# Patient Record
Sex: MALE | Race: BLACK OR AFRICAN AMERICAN | NOT HISPANIC OR LATINO | Employment: UNEMPLOYED | URBAN - METROPOLITAN AREA
[De-identification: names, ages, dates, MRNs, and addresses within clinical notes are randomized per-mention and may not be internally consistent; named-entity substitution may affect disease eponyms.]

---

## 2017-02-22 ENCOUNTER — HISTORICAL (OUTPATIENT)
Dept: INTERNAL MEDICINE | Facility: CLINIC | Age: 52
End: 2017-02-22

## 2017-09-12 ENCOUNTER — HISTORICAL (OUTPATIENT)
Dept: INTERNAL MEDICINE | Facility: CLINIC | Age: 52
End: 2017-09-12

## 2017-09-12 LAB
AMPHET UR QL SCN: NEGATIVE
BARBITURATE SCN PRESENT UR: NEGATIVE
BENZODIAZ UR QL SCN: NEGATIVE
CANNABINOIDS UR QL SCN: NEGATIVE
COCAINE UR QL SCN: POSITIVE
CRP SERPL-MCNC: <0.3 MG/DL
ERYTHROCYTE [SEDIMENTATION RATE] IN BLOOD: 26 MM/HR (ref 0–15)
ETHANOL SERPL-MCNC: 102 MG/DL
HAV IGM SERPL QL IA: NONREACTIVE
HBV CORE IGM SERPL QL IA: NONREACTIVE
HBV SURFACE AG SERPL QL IA: NEGATIVE
HCV AB SERPL QL IA: NONREACTIVE
HIV 1+2 AB+HIV1 P24 AG SERPL QL IA: NONREACTIVE
OPIATES UR QL SCN: NEGATIVE
PCP UR QL: NEGATIVE
PH UR STRIP.AUTO: 5.5 [PH] (ref 5–8)
T PALLIDUM AB SER QL: NONREACTIVE
TEMPERATURE, URINE (OHS): 24.4 DEGC (ref 20–25)

## 2018-02-12 ENCOUNTER — HISTORICAL (OUTPATIENT)
Dept: INTERNAL MEDICINE | Facility: CLINIC | Age: 53
End: 2018-02-12

## 2018-02-12 LAB
ABS NEUT (OLG): 1.79 X10(3)/MCL (ref 2.1–9.2)
ALBUMIN SERPL-MCNC: 3.7 GM/DL (ref 3.4–5)
ALBUMIN/GLOB SERPL: 1 RATIO (ref 1–2)
ALP SERPL-CCNC: 55 UNIT/L (ref 45–117)
ALT SERPL-CCNC: 50 UNIT/L (ref 12–78)
APPEARANCE, UA: CLEAR
AST SERPL-CCNC: 238 UNIT/L (ref 15–37)
BACTERIA #/AREA URNS AUTO: ABNORMAL /[HPF]
BASOPHILS # BLD AUTO: 0.02 X10(3)/MCL
BASOPHILS NFR BLD AUTO: 1 % (ref 0–1)
BILIRUB SERPL-MCNC: 0.5 MG/DL (ref 0.2–1)
BILIRUB UR QL STRIP: NEGATIVE
BILIRUBIN DIRECT+TOT PNL SERPL-MCNC: 0.2 MG/DL
BILIRUBIN DIRECT+TOT PNL SERPL-MCNC: 0.3 MG/DL
BUN SERPL-MCNC: 10 MG/DL (ref 7–18)
CALCIUM SERPL-MCNC: 8.7 MG/DL (ref 8.5–10.1)
CHLORIDE SERPL-SCNC: 101 MMOL/L (ref 98–107)
CHOLEST SERPL-MCNC: 221 MG/DL
CHOLEST/HDLC SERPL: 3.2 {RATIO} (ref 0–5)
CO2 SERPL-SCNC: 25 MMOL/L (ref 21–32)
COLOR UR: YELLOW
CREAT SERPL-MCNC: 0.8 MG/DL (ref 0.6–1.3)
DEPRECATED CALCIDIOL+CALCIFEROL SERPL-MC: 9.95 NG/ML (ref 30–80)
EOSINOPHIL # BLD AUTO: 0.03 10*3/UL
EOSINOPHIL NFR BLD AUTO: 1 % (ref 0–5)
ERYTHROCYTE [DISTWIDTH] IN BLOOD BY AUTOMATED COUNT: 14.1 % (ref 11.5–14.5)
GLOBULIN SER-MCNC: 5.3 GM/ML (ref 2.3–3.5)
GLUCOSE (UA): NORMAL
GLUCOSE SERPL-MCNC: 73 MG/DL (ref 74–106)
HAV IGM SERPL QL IA: NONREACTIVE
HBV CORE IGM SERPL QL IA: NONREACTIVE
HBV SURFACE AG SERPL QL IA: NEGATIVE
HCT VFR BLD AUTO: 37.3 % (ref 40–51)
HCV AB SERPL QL IA: NONREACTIVE
HDLC SERPL-MCNC: 69 MG/DL
HGB BLD-MCNC: 12.6 GM/DL (ref 13.5–17.5)
HGB UR QL STRIP: 0.03 MG/DL
HIV 1+2 AB+HIV1 P24 AG SERPL QL IA: NONREACTIVE
HYALINE CASTS #/AREA URNS LPF: 0 /[LPF]
IMM GRANULOCYTES # BLD AUTO: 0.01 10*3/UL
IMM GRANULOCYTES NFR BLD AUTO: 0 %
KETONES UR QL STRIP: 20 MG/DL
LDLC SERPL CALC-MCNC: 140 MG/DL (ref 0–130)
LEUKOCYTE ESTERASE UR QL STRIP: NEGATIVE
LYMPHOCYTES # BLD AUTO: 0.65 X10(3)/MCL
LYMPHOCYTES NFR BLD AUTO: 24 % (ref 15–40)
MCH RBC QN AUTO: 29.8 PG (ref 26–34)
MCHC RBC AUTO-ENTMCNC: 33.8 GM/DL (ref 31–37)
MCV RBC AUTO: 88.2 FL (ref 80–100)
MONOCYTES # BLD AUTO: 0.16 X10(3)/MCL
MONOCYTES NFR BLD AUTO: 6 % (ref 4–12)
MUCOUS THREADS URNS QL MICRO: ABNORMAL
NEUTROPHILS # BLD AUTO: 1.79 X10(3)/MCL
NEUTROPHILS NFR BLD AUTO: 67 X10(3)/MCL
NITRITE UR QL STRIP: NEGATIVE
PH UR STRIP: 5.5 [PH] (ref 4.5–8)
PLATELET # BLD AUTO: 66 X10(3)/MCL (ref 130–400)
PMV BLD AUTO: 10.4 FL (ref 7.4–10.4)
POTASSIUM SERPL-SCNC: 3.6 MMOL/L (ref 3.5–5.1)
PROT SERPL-MCNC: 9 GM/DL (ref 6.4–8.2)
PROT UR QL STRIP: 50 MG/DL
PSA SERPL-MCNC: 0.4 NG/ML
RBC # BLD AUTO: 4.23 X10(6)/MCL (ref 4.5–5.9)
RBC #/AREA URNS AUTO: ABNORMAL /[HPF]
SODIUM SERPL-SCNC: 139 MMOL/L (ref 136–145)
SP GR UR STRIP: 1.03 (ref 1–1.03)
SQUAMOUS #/AREA URNS LPF: ABNORMAL /[LPF]
T PALLIDUM AB SER QL: NONREACTIVE
TRIGL SERPL-MCNC: 58 MG/DL
TSH SERPL-ACNC: 0.83 MIU/L (ref 0.36–3.74)
UROBILINOGEN UR STRIP-ACNC: 2 MG/DL
VLDLC SERPL CALC-MCNC: 12 MG/DL
WBC # SPEC AUTO: 2.7 X10(3)/MCL (ref 4.5–11)
WBC #/AREA URNS AUTO: ABNORMAL /HPF

## 2018-03-12 ENCOUNTER — HISTORICAL (OUTPATIENT)
Dept: ADMINISTRATIVE | Facility: HOSPITAL | Age: 53
End: 2018-03-12

## 2018-03-12 LAB
AMMONIA PLAS-MSCNC: 26 MMOL/L (ref 11–32)
FOLATE SERPL-MCNC: 5.9 NG/ML (ref 3.1–17.5)
VIT B12 SERPL-MCNC: 1390 PG/ML (ref 193–986)

## 2019-09-23 ENCOUNTER — HISTORICAL (OUTPATIENT)
Dept: INTERNAL MEDICINE | Facility: CLINIC | Age: 54
End: 2019-09-23

## 2019-09-23 LAB
ABS NEUT (OLG): 3.13 X10(3)/MCL (ref 2.1–9.2)
ALBUMIN SERPL-MCNC: 3.6 GM/DL (ref 3.4–5)
ALBUMIN/GLOB SERPL: 0.8 RATIO (ref 1.1–2)
ALP SERPL-CCNC: 66 UNIT/L (ref 45–117)
ALT SERPL-CCNC: 34 UNIT/L (ref 12–78)
AST SERPL-CCNC: 27 UNIT/L (ref 15–37)
BASOPHILS # BLD AUTO: 0 X10(3)/MCL (ref 0–0.2)
BASOPHILS NFR BLD AUTO: 0 %
BILIRUB SERPL-MCNC: 0.6 MG/DL (ref 0.2–1)
BILIRUBIN DIRECT+TOT PNL SERPL-MCNC: 0.2 MG/DL (ref 0–0.2)
BILIRUBIN DIRECT+TOT PNL SERPL-MCNC: 0.4 MG/DL
BUN SERPL-MCNC: 11 MG/DL (ref 7–18)
CALCIUM SERPL-MCNC: 9.6 MG/DL (ref 8.5–10.1)
CHLORIDE SERPL-SCNC: 109 MMOL/L (ref 98–107)
CHOLEST SERPL-MCNC: 148 MG/DL
CHOLEST/HDLC SERPL: 4.1 {RATIO} (ref 0–5)
CO2 SERPL-SCNC: 26 MMOL/L (ref 21–32)
CREAT SERPL-MCNC: 0.9 MG/DL (ref 0.6–1.3)
EOSINOPHIL # BLD AUTO: 0 X10(3)/MCL (ref 0–0.9)
EOSINOPHIL NFR BLD AUTO: 1 %
ERYTHROCYTE [DISTWIDTH] IN BLOOD BY AUTOMATED COUNT: 13.2 % (ref 11.5–14.5)
EST. AVERAGE GLUCOSE BLD GHB EST-MCNC: 126 MG/DL
GLOBULIN SER-MCNC: 4.7 GM/ML (ref 2.3–3.5)
GLUCOSE SERPL-MCNC: 84 MG/DL (ref 74–106)
HBA1C MFR BLD: 6 % (ref 4.2–6.3)
HCT VFR BLD AUTO: 41 % (ref 40–51)
HDLC SERPL-MCNC: 36 MG/DL (ref 40–59)
HGB BLD-MCNC: 12.7 GM/DL (ref 13.5–17.5)
IMM GRANULOCYTES # BLD AUTO: 0.01 10*3/UL
IMM GRANULOCYTES NFR BLD AUTO: 0 %
LDLC SERPL CALC-MCNC: 97 MG/DL
LYMPHOCYTES # BLD AUTO: 1.1 X10(3)/MCL (ref 0.6–4.6)
LYMPHOCYTES NFR BLD AUTO: 23 %
MCH RBC QN AUTO: 29.5 PG (ref 26–34)
MCHC RBC AUTO-ENTMCNC: 31 GM/DL (ref 31–37)
MCV RBC AUTO: 95.3 FL (ref 80–100)
MONOCYTES # BLD AUTO: 0.3 X10(3)/MCL (ref 0.1–1.3)
MONOCYTES NFR BLD AUTO: 7 %
NEUTROPHILS # BLD AUTO: 3.13 X10(3)/MCL (ref 2.1–9.2)
NEUTROPHILS NFR BLD AUTO: 68 %
PLATELET # BLD AUTO: 180 X10(3)/MCL (ref 130–400)
PMV BLD AUTO: 10.6 FL (ref 7.4–10.4)
POTASSIUM SERPL-SCNC: 3.4 MMOL/L (ref 3.5–5.1)
PROT SERPL-MCNC: 8.3 GM/DL (ref 6.4–8.2)
RBC # BLD AUTO: 4.3 X10(6)/MCL (ref 4.5–5.9)
SODIUM SERPL-SCNC: 143 MMOL/L (ref 136–145)
TRIGL SERPL-MCNC: 73 MG/DL
VLDLC SERPL CALC-MCNC: 15 MG/DL
WBC # SPEC AUTO: 4.6 X10(3)/MCL (ref 4.5–11)

## 2019-10-21 ENCOUNTER — HISTORICAL (OUTPATIENT)
Dept: INTERNAL MEDICINE | Facility: CLINIC | Age: 54
End: 2019-10-21

## 2019-12-18 ENCOUNTER — HISTORICAL (OUTPATIENT)
Dept: ADMINISTRATIVE | Facility: HOSPITAL | Age: 54
End: 2019-12-18

## 2019-12-18 LAB
ABS NEUT (OLG): 4.51 X10(3)/MCL (ref 2.1–9.2)
ALBUMIN SERPL-MCNC: 3.9 GM/DL (ref 3.4–5)
ALBUMIN/GLOB SERPL: 0.8 RATIO (ref 1.1–2)
ALP SERPL-CCNC: 72 UNIT/L (ref 45–117)
ALT SERPL-CCNC: 22 UNIT/L (ref 12–78)
AST SERPL-CCNC: 33 UNIT/L (ref 15–37)
BASOPHILS # BLD AUTO: 0 X10(3)/MCL (ref 0–0.2)
BASOPHILS NFR BLD AUTO: 0 %
BILIRUB SERPL-MCNC: 0.6 MG/DL (ref 0.2–1)
BILIRUBIN DIRECT+TOT PNL SERPL-MCNC: 0.2 MG/DL (ref 0–0.2)
BILIRUBIN DIRECT+TOT PNL SERPL-MCNC: 0.4 MG/DL
BUN SERPL-MCNC: 11 MG/DL (ref 7–18)
CALCIUM SERPL-MCNC: 9.4 MG/DL (ref 8.5–10.1)
CHLORIDE SERPL-SCNC: 104 MMOL/L (ref 98–107)
CO2 SERPL-SCNC: 25 MMOL/L (ref 21–32)
CREAT SERPL-MCNC: 0.8 MG/DL (ref 0.6–1.3)
EOSINOPHIL # BLD AUTO: 0 X10(3)/MCL (ref 0–0.9)
EOSINOPHIL NFR BLD AUTO: 0 %
ERYTHROCYTE [DISTWIDTH] IN BLOOD BY AUTOMATED COUNT: 13.2 % (ref 11.5–14.5)
GLOBULIN SER-MCNC: 4.9 GM/ML (ref 2.3–3.5)
GLUCOSE SERPL-MCNC: 85 MG/DL (ref 74–106)
HCT VFR BLD AUTO: 39 % (ref 40–51)
HGB BLD-MCNC: 12.5 GM/DL (ref 13.5–17.5)
IMM GRANULOCYTES # BLD AUTO: 0.02 10*3/UL
IMM GRANULOCYTES NFR BLD AUTO: 0 %
LYMPHOCYTES # BLD AUTO: 1.2 X10(3)/MCL (ref 0.6–4.6)
LYMPHOCYTES NFR BLD AUTO: 19 %
MCH RBC QN AUTO: 27.8 PG (ref 26–34)
MCHC RBC AUTO-ENTMCNC: 32.1 GM/DL (ref 31–37)
MCV RBC AUTO: 86.9 FL (ref 80–100)
MONOCYTES # BLD AUTO: 0.3 X10(3)/MCL (ref 0.1–1.3)
MONOCYTES NFR BLD AUTO: 6 %
NEUTROPHILS # BLD AUTO: 4.51 X10(3)/MCL (ref 2.1–9.2)
NEUTROPHILS NFR BLD AUTO: 74 %
PLATELET # BLD AUTO: 177 X10(3)/MCL (ref 130–400)
PMV BLD AUTO: 10.3 FL (ref 7.4–10.4)
POTASSIUM SERPL-SCNC: 3.6 MMOL/L (ref 3.5–5.1)
PROT SERPL-MCNC: 8.8 GM/DL (ref 6.4–8.2)
RBC # BLD AUTO: 4.49 X10(6)/MCL (ref 4.5–5.9)
SODIUM SERPL-SCNC: 138 MMOL/L (ref 136–145)
WBC # SPEC AUTO: 6.1 X10(3)/MCL (ref 4.5–11)

## 2021-02-22 ENCOUNTER — HISTORICAL (OUTPATIENT)
Dept: INTERNAL MEDICINE | Facility: CLINIC | Age: 56
End: 2021-02-22

## 2021-02-22 LAB
ALBUMIN SERPL-MCNC: 3.7 GM/DL (ref 3.5–5)
ALBUMIN/GLOB SERPL: 0.9 RATIO (ref 1.1–2)
ALP SERPL-CCNC: 62 UNIT/L (ref 40–150)
ALT SERPL-CCNC: 23 UNIT/L (ref 0–55)
APPEARANCE, UA: CLEAR
AST SERPL-CCNC: 40 UNIT/L (ref 5–34)
BACTERIA #/AREA URNS AUTO: ABNORMAL /HPF
BILIRUB SERPL-MCNC: 0.4 MG/DL
BILIRUB UR QL STRIP: NEGATIVE
BILIRUBIN DIRECT+TOT PNL SERPL-MCNC: 0.2 MG/DL (ref 0–0.5)
BILIRUBIN DIRECT+TOT PNL SERPL-MCNC: 0.2 MG/DL (ref 0–0.8)
BUN SERPL-MCNC: 16 MG/DL (ref 8.4–25.7)
CALCIUM SERPL-MCNC: 9.1 MG/DL (ref 8.4–10.2)
CHLORIDE SERPL-SCNC: 102 MMOL/L (ref 98–107)
CHLORIDE UR-SCNC: 168 MMOL/L
CO2 SERPL-SCNC: 28 MMOL/L (ref 22–29)
COLOR UR: NORMAL
CREAT SERPL-MCNC: 0.92 MG/DL (ref 0.73–1.18)
CREAT UR-MCNC: 132.2 MG/DL (ref 58–161)
DEPRECATED CALCIDIOL+CALCIFEROL SERPL-MC: 11.3 NG/ML (ref 30–80)
GLOBULIN SER-MCNC: 4.3 GM/DL (ref 2.4–3.5)
GLUCOSE (UA): NEGATIVE
GLUCOSE SERPL-MCNC: 95 MG/DL (ref 74–100)
HGB UR QL STRIP: NEGATIVE
HYALINE CASTS #/AREA URNS LPF: ABNORMAL /LPF
KETONES UR QL STRIP: NEGATIVE
LEUKOCYTE ESTERASE UR QL STRIP: NEGATIVE
MAGNESIUM SERPL-MCNC: 1.62 MG/DL (ref 1.6–2.6)
NITRITE UR QL STRIP: NEGATIVE
OSMOLALITY SERPL: 295 MOSM/KG (ref 280–300)
OSMOLALITY UR: 715 MOSM/KG (ref 300–1300)
PH UR STRIP: 5.5 [PH] (ref 4.5–8)
PHOSPHATE SERPL-MCNC: 3.5 MG/DL (ref 2.3–4.7)
POTASSIUM SERPL-SCNC: 4.4 MMOL/L (ref 3.5–5.1)
POTASSIUM UR-SCNC: 93.5 MMOL/L
PROT SERPL-MCNC: 8 GM/DL (ref 6.4–8.3)
PROT UR QL STRIP: NEGATIVE
PROT UR STRIP-MCNC: 9.6 MG/DL
PROT/CREAT UR-RTO: 72.6 MG/GM CR
RBC #/AREA URNS AUTO: ABNORMAL /HPF
SODIUM SERPL-SCNC: 139 MMOL/L (ref 136–145)
SODIUM UR-SCNC: 104 MMOL/L
SP GR UR STRIP: 1.02 (ref 1–1.03)
SQUAMOUS #/AREA URNS LPF: ABNORMAL /LPF
UROBILINOGEN UR STRIP-ACNC: NORMAL
WBC #/AREA URNS AUTO: ABNORMAL /HPF

## 2021-07-29 ENCOUNTER — HISTORICAL (OUTPATIENT)
Dept: ADMINISTRATIVE | Facility: HOSPITAL | Age: 56
End: 2021-07-29

## 2021-07-29 LAB
ABS NEUT (OLG): 3.83 X10(3)/MCL (ref 2.1–9.2)
ALBUMIN SERPL-MCNC: 3.7 GM/DL (ref 3.5–5)
ALBUMIN/GLOB SERPL: 0.8 RATIO (ref 1.1–2)
ALP SERPL-CCNC: 68 UNIT/L (ref 40–150)
ALT SERPL-CCNC: 44 UNIT/L (ref 0–55)
AST SERPL-CCNC: 98 UNIT/L (ref 5–34)
BASOPHILS # BLD AUTO: 0 X10(3)/MCL (ref 0–0.2)
BASOPHILS NFR BLD AUTO: 0 %
BILIRUB SERPL-MCNC: 0.6 MG/DL
BILIRUBIN DIRECT+TOT PNL SERPL-MCNC: 0.3 MG/DL (ref 0–0.5)
BILIRUBIN DIRECT+TOT PNL SERPL-MCNC: 0.3 MG/DL (ref 0–0.8)
BUN SERPL-MCNC: 8 MG/DL (ref 8.4–25.7)
CALCIUM SERPL-MCNC: 9.1 MG/DL (ref 8.4–10.2)
CHLORIDE SERPL-SCNC: 98 MMOL/L (ref 98–107)
CHOLEST SERPL-MCNC: 194 MG/DL
CHOLEST/HDLC SERPL: 4 {RATIO} (ref 0–5)
CO2 SERPL-SCNC: 26 MMOL/L (ref 22–29)
CREAT SERPL-MCNC: 0.86 MG/DL (ref 0.73–1.18)
EOSINOPHIL # BLD AUTO: 0.1 X10(3)/MCL (ref 0–0.9)
EOSINOPHIL NFR BLD AUTO: 2 %
ERYTHROCYTE [DISTWIDTH] IN BLOOD BY AUTOMATED COUNT: 13.1 % (ref 11.5–14.5)
ETHANOL SERPL-MCNC: 124 MG/DL
GLOBULIN SER-MCNC: 4.9 GM/DL (ref 2.4–3.5)
GLUCOSE SERPL-MCNC: 94 MG/DL (ref 74–100)
HCT VFR BLD AUTO: 37.3 % (ref 40–51)
HDLC SERPL-MCNC: 52 MG/DL (ref 35–60)
HGB BLD-MCNC: 12.8 GM/DL (ref 13.5–17.5)
IMM GRANULOCYTES # BLD AUTO: 0.02 10*3/UL
IMM GRANULOCYTES NFR BLD AUTO: 0 %
LDLC SERPL CALC-MCNC: 126 MG/DL (ref 50–140)
LYMPHOCYTES # BLD AUTO: 0.8 X10(3)/MCL (ref 0.6–4.6)
LYMPHOCYTES NFR BLD AUTO: 16 %
MCH RBC QN AUTO: 30.2 PG (ref 26–34)
MCHC RBC AUTO-ENTMCNC: 34.3 GM/DL (ref 31–37)
MCV RBC AUTO: 88 FL (ref 80–100)
MONOCYTES # BLD AUTO: 0.3 X10(3)/MCL (ref 0.1–1.3)
MONOCYTES NFR BLD AUTO: 5 %
NEUTROPHILS # BLD AUTO: 3.83 X10(3)/MCL (ref 2.1–9.2)
NEUTROPHILS NFR BLD AUTO: 77 %
NRBC BLD AUTO-RTO: 0 % (ref 0–0.2)
PLATELET # BLD AUTO: 123 X10(3)/MCL (ref 130–400)
PMV BLD AUTO: 10.1 FL (ref 7.4–10.4)
POTASSIUM SERPL-SCNC: 3.9 MMOL/L (ref 3.5–5.1)
PROT SERPL-MCNC: 8.6 GM/DL (ref 6.4–8.3)
RBC # BLD AUTO: 4.24 X10(6)/MCL (ref 4.5–5.9)
SODIUM SERPL-SCNC: 134 MMOL/L (ref 136–145)
TRIGL SERPL-MCNC: 79 MG/DL (ref 34–140)
TSH SERPL-ACNC: 0.67 UIU/ML (ref 0.35–4.94)
VIT B12 SERPL-MCNC: 1600 PG/ML (ref 213–816)
VLDLC SERPL CALC-MCNC: 16 MG/DL
WBC # SPEC AUTO: 5 X10(3)/MCL (ref 4.5–11)

## 2022-04-10 ENCOUNTER — HISTORICAL (OUTPATIENT)
Dept: ADMINISTRATIVE | Facility: HOSPITAL | Age: 57
End: 2022-04-10
Payer: MEDICAID

## 2022-04-27 VITALS
DIASTOLIC BLOOD PRESSURE: 82 MMHG | BODY MASS INDEX: 30.79 KG/M2 | HEIGHT: 67 IN | SYSTOLIC BLOOD PRESSURE: 136 MMHG | WEIGHT: 196.19 LBS | OXYGEN SATURATION: 99 %

## 2022-05-03 NOTE — HISTORICAL [facility]
This is a historical note converted from [place]. Formatting and pictures may have been removed.  Please reference [place] for original formatting and attached multimedia. Chief Complaint  F/U, RESULTS OF FIT, NEEDS REFILLS, NO C/O VOICED, DID NOT TAKE B/P MEDS TODAY  History of Present Illness  This is a 54 year old AAM with a past medical history of alcohol abuse and hypertension.? He presents to IM clinic today in order to establish care after post wards clinic.? He does not have any complaints today.? His vitals today are significant for some hypertension, he is currently on norvasc 5mg, upon repeat his blood pressure was found to be ___.? He endorses ___ antibiotic compliance.? He  ?   Social Hx:  alcohol: continues to drink one 40 oz can of beer per day  tobacco: denies  illicits: denies  Review of Systems  CONSTITUTIONAL: No weight loss, subjective fever, chills, weakness or fatigue.?  HEENT: Eyes: No visual loss, blurred vision, double vision or yellow sclerae. Ears, Nose, Throat: No hearing loss, sneezing, congestion, runny nose or sore throat.?  SKIN: No rash or itching.?  CARDIOVASCULAR: No chest pain, chest pressure or chest discomfort. No palpitations or edema.?  RESPIRATORY: No shortness of breath, cough or sputum.?  GASTROINTESTINAL: No anorexia, nausea, vomiting or diarrhea. No abdominal pain or blood.?  GENITOURINARY: No dysuria, hematuria, or incontinence  NEUROLOGICAL: No headache, dizziness, syncope, paralysis, ataxia, numbness or tingling in the extremities. No change in bowel or bladder control.?  MUSCULOSKELETAL: No muscle, back pain, joint pain or stiffness.?  HEMATOLOGIC: No anemia, bleeding or bruising.?  ENDOCRINOLOGIC: No reports of sweating, cold or heat intolerance. No polyuria or polydipsia.?  ?  Physical Exam  Vitals & Measurements  T:?36.7? ?C (Oral)? HR:?87(Peripheral)? RR:?22? BP:?152/84?  HT:?175?cm? WT:?87.1?kg? BMI:?28.44?  Gen: He is alert and oriented x3. Well developed,  well-nourished, NAD.  Head: Normocephalic  Eyes: PERRL, EOMI, no conjunctival injection or pallor  Nose: No nasal discharge  Throat: No pharyngeal inflammation, erythema, discharge, mucous membranes moist  Neck: Supple. No carotid bruits.? No lymphadenopathy or thyromegaly.  Lungs: Clear to auscultation bilaterally  CV: Normal S1 & S2, RRR, no murmurs appreciated  Abdomen: Soft, NT/ND, bowel sounds present.? No hepatosplenomegaly  Ext: No cyanosis/clubbing/edema, pulses 2+  Neuro:?CN II-XII grossly intact, strength 5/5 throughout, normal sensation  Psych: Flat affect, but denies suicidal or homicidal ideations  Skin: No rashes, lesions, ulceration or induration  Assessment/Plan  Alcoholism  -the patient continues to drink 40 oz of beer per day  -ultrasound of liver done on 8/12/19 shows pattern suggestive of alcoholic hepatitis  -Despite a lengthy discussion about the benefits of alcohol cessation as well as the harms of drinking given that he has been hospitalized in the past, he is not interested in quitting drinking at this time, and is not interested in alcoholics anonymous  -reinforced that whenever he is ready to quit drinking, we would be available to help provide resources and support  ?  Hypertension  -patient blood pressure was in 150s in clinic today  -will discontinue amlodipine, will initiate propranolol 20mg twice a day due to his essential tremor  -will return to clinic in one month  ?  ?   Fecal occult blood test?done on 10/20/19 was negative  Vaccines up to date  ?  ?   RTC in?1 month  Referrals  Clinic Follow up, *Est. 01/18/20 3:00:00 CST, Order for future visit, HTN - Hypertension  Alcoholism, [facility]   Problem List/Past Medical History  Ongoing  Alcoholism  HTN - Hypertension  Historical  No qualifying data  Procedure/Surgical History  none   Medications  folic acid 1 mg oral tablet, 1 mg= 1 tab(s), Oral, Daily, 1 refills,? ?Not taking: OUT X 1 WEEK  Norvasc 5 mg oral tablet, 5 mg= 1  tab(s), Oral, Daily, 1 refills  thiamine 100 mg oral tablet, 100 mg= 1 tab(s), Oral, Daily, 1 refills,? ?Not taking: OUT X 1 WEEK  Vitamin B1 with Calcium oral tablet, Oral, Daily,? ?Not taking: OUT X 1 WEEK  Allergies  No Known Allergies  Social History  Abuse/Neglect  No, No, Yes, 12/18/2019  Alcohol  Current, Beer, Daily, Alcohol use interferes with work or home: No. Others hurt by drinking: No. Household alcohol concerns: No., 12/18/2019  Employment/School  Unemployed, 03/12/2018  Exercise  Home/Environment  Lives with Siblings. Living situation: Home/Independent. Alcohol abuse in household: Yes. Substance abuse in household: No. Smoker in household: No. Injuries/Abuse/Neglect in household: No. Family/Friends available for support: Yes. Concern for family members at home: No. Major illness in household: No. Financial concerns: No. TV/Computer concerns: No., 03/12/2018  Nutrition/Health  Regular, Wants to lose weight: Yes. Sleeping concerns: No. Feels highly stressed: No., 03/12/2018  Sexual  Substance Use - Denies Substance Abuse, 12/07/2015  Past, Marijuana, Drug use interferes with work/home: No., 12/18/2019  Tobacco - Denies Tobacco Use, 08/26/2012  Cigars or pipes but not daily within last 30 days, No, Household tobacco concerns: No. Smokeless Tobacco Use: Never., 12/18/2019  Family History  Family history is negative  Immunizations  Vaccine Date Status   tetanus-diphtheria toxoids 08/26/2012 Given   Health Maintenance  Health Maintenance  ???Pending?(in the next year)  ??? ??OverDue  ??? ? ? ?Diabetes Screening due??and every?  ??? ??Due?  ??? ? ? ?Aspirin Therapy for CVD Prevention due??12/18/19??and every 1??year(s)  ??? ? ? ?Hypertension Management-Education due??12/18/19??and every 1??year(s)  ??? ??Due In Future?  ??? ? ? ?Alcohol Misuse Screening not due until??01/01/20??and every 1??year(s)  ??? ? ? ?Obesity Screening not due until??01/01/20??and every 1??year(s)  ??? ? ? ?Hypertension Management-BMP  not due until??09/22/20??and every 1??year(s)  ??? ? ? ?Colorectal Screening not due until??10/19/20??and every 1??year(s)  ??? ? ? ?Blood Pressure Screening not due until??12/17/20??and every 1??year(s)  ??? ? ? ?Body Mass Index Check not due until??12/17/20??and every 1??year(s)  ??? ? ? ?Depression Screening not due until??12/17/20??and every 1??year(s)  ??? ? ? ?Hypertension Management-Blood Pressure not due until??12/17/20??and every 1??year(s)  ???Satisfied?(in the past 1 year)  ??? ??Satisfied?  ??? ? ? ?ADL Screening on??12/18/19.??Satisfied by Roly DAVID, Laura Tommy  ??? ? ? ?Alcohol Misuse Screening on??08/26/19.??Satisfied by Ry Barton MD  ??? ? ? ?Blood Pressure Screening on??12/18/19.??Satisfied by Roly DAVID Laura Tommy  ??? ? ? ?Body Mass Index Check on??12/18/19.??Satisfied by Roly DAVID, Laura Tommy  ??? ? ? ?Colorectal Screening on??10/20/19.??Satisfied by Maegan Green  ??? ? ? ?Depression Screening on??12/18/19.??Satisfied by Roly DAVID, Laura Tommy  ??? ? ? ?Diabetes Screening on??09/23/19.??Satisfied by Natalie Gutierrez  ??? ? ? ?Hypertension Management-Blood Pressure on??12/18/19.??Satisfied by Roly DAVID, Laura Tommy  ??? ? ? ?Lipid Screening on??09/23/19.??Satisfied by Natalie Gutierrez  ??? ? ? ?Obesity Screening on??12/18/19.??Satisfied by Roly DAVID, Laura Tommy  ?  Lab Results  Labs Last 24 Hours?  ?Chemistry? Hematology/Coagulation?   Sodium Lvl: 138 mmol/L (12/18/19 14:10:00) WBC: 6.1 x10(3)/mcL (12/18/19 14:10:00)   Potassium Lvl: 3.6 mmol/L (12/18/19 14:10:00) RBC:?4.49 x10(6)/mcL?Low (12/18/19 14:10:00)   Chloride: 104 mmol/L (12/18/19 14:10:00) Hgb:?12.5 gm/dL?Low (12/18/19 14:10:00)   CO2: 25 mmol/L (12/18/19 14:10:00) Hct:?39 %?Low (12/18/19 14:10:00)   Calcium Lvl: 9.4 mg/dL (12/18/19 14:10:00) Platelet: 177 x10(3)/mcL (12/18/19 14:10:00)   Glucose Lvl: 85 mg/dL (12/18/19 14:10:00) MCV: 86.9 fL (12/18/19 14:10:00)   BUN: 11 mg/dL (12/18/19  14:10:00) MCH: 27.8 pg (12/18/19 14:10:00)   Creatinine: 0.8 mg/dL (12/18/19 14:10:00) MCHC: 32.1 gm/dL (12/18/19 14:10:00)   eGFR-AA: >105 (12/18/19 14:10:00) RDW: 13.2 % (12/18/19 14:10:00)   eGFR-MELODIE: >105 (12/18/19 14:10:00) MPV: 10.3 fL (12/18/19 14:10:00)   Bili Total: 0.6 mg/dL (12/18/19 14:10:00) Abs Neut: 4.51 x10(3)/mcL (12/18/19 14:10:00)   Bili Direct: 0.2 mg/dL (12/18/19 14:10:00) Neutro Auto: 74 % (12/18/19 14:10:00)   Bili Indirect: 0.4 mg/dL (12/18/19 14:10:00) Lymph Auto: 19 % (12/18/19 14:10:00)   AST: 33 unit/L (12/18/19 14:10:00) Mono Auto: 6 % (12/18/19 14:10:00)   ALT: 22 unit/L (12/18/19 14:10:00) Eos Auto: 0 % (12/18/19 14:10:00)   Alk Phos: 72 unit/L (12/18/19 14:10:00) Abs Eos: 0 x10(3)/mcL (12/18/19 14:10:00)   Total Protein:?8.8 gm/dL?High (12/18/19 14:10:00) Basophil Auto: 0 % (12/18/19 14:10:00)   Albumin Lvl: 3.9 gm/dL (12/18/19 14:10:00) Abs Neutro: 4.51 x10(3)/mcL (12/18/19 14:10:00)   Globulin:?4.9 gm/mL?High (12/18/19 14:10:00) Abs Lymph: 1.2 x10(3)/mcL (12/18/19 14:10:00)   A/G Ratio:?0.8 ratio?Low (12/18/19 14:10:00) Abs Mono: 0.3 x10(3)/mcL (12/18/19 14:10:00)    Abs Baso: 0 x10(3)/mcL (12/18/19 14:10:00)    IG%: 0 % (12/18/19 14:10:00)    IG#: 0.02 (12/18/19 14:10:00)       Pt D/W medicine resident in clinic  Agree with above A/P

## 2022-05-03 NOTE — HISTORICAL [facility]
This is a historical note converted from Mayte. Formatting and pictures may have been removed.  Please reference Mayte for original formatting and attached multimedia. Chief Complaint  medication refill  sinus drip  History of Present Illness  Mr. Denton is a 55 year old AA male with a past medical history of alcohol abuse with hospitalizations from [facility].? He was last seen by me in 3/21.? His only complaint today is of weakness in his bilateral legs.? He states he occasionally loses his balance.? The weakness is better when he sits down.? He continues to drink about 25 ounces of beer per day?and reports that he is interested in quitting but does continue to have cravings.? He was last hospitalized?from s?back in December 2020.? He lives at home with his mother.? He has no additional complaints today.  ?  Past medical history  alcoholism  hypertension  hepatic steatosis  ?  Past surgical history  none  ?  Social History  alcohol: drinks 25 oz of beer per day  tobacco: denies  illicits: denies  Review of Systems  10 pt ROS negative unless otherwise stated above  Physical Exam  Vitals & Measurements  T:?37.0? ?C (Oral)? HR:?72(Peripheral)? RR:?20? BP:?142/86?  HT:?169.00?cm? WT:?87.500?kg? BMI:?30.64?  Gen: He is alert and oriented x3. Well developed, well-nourished, NAD.  Head: Normocephalic  Eyes: PERRL, EOMI, no conjunctival injection or pallor  Nose: No nasal discharge  Throat: No pharyngeal inflammation, erythema, discharge, mucous membranes moist  Neck: Supple. No carotid bruits.? No lymphadenopathy or thyromegaly.  Lungs: Clear to auscultation bilaterally  CV: Normal S1 & S2, RRR, no murmurs appreciated  Abdomen: Soft, NT/ND, bowel sounds present.? No hepatosplenomegaly  Ext: No cyanosis/clubbing/edema, pulses 2+  Neuro:?CN II-XII grossly intact, strength 5/5 throughout, normal sensation, (+) positive romberg sign after closing his eyes  Psych: Flat affect, but denies suicidal or homicidal ideations  Skin: No  rashes, lesions, ulceration or induration  Assessment/Plan  Alcohol abuse with hepatic steatosis  Counseled patient regarding alcohol cessation, especially given hx of DTs and ICU hospitalizations  Hepatitic steatosis is likely a result of alcoholism  Continue thiamine 100mg daily and folic acid 1mg daily  propranolol 20mg BID for tremors  Will start on acamprosate 666mg TID  ?   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   Hypertension  likely related to alcoholism  will increase propranolol to 40mg BID  ?  ?  ?   RTC in?3 months for follow up evaluation for alcoholism  ?   Ry Barton MD  Internal Medicine PGY2  ?   Attending physician addendum  Patient discussed?with medicine resident  I definitely strongly agree with?Dr. Barton-patient needs to seek alcohol rehab  Agree with above assessment and plan   Problem List/Past Medical History  Ongoing  Alcoholism  HTN - Hypertension  Obesity  Historical  No qualifying data  Procedure/Surgical History  none   Medications  folic acid 1 mg oral tablet, 1 mg= 1 tab(s), Oral, Daily, 3 refills  magnesium oxide 400 mg (240 mg elemental magnesium) oral tablet, 400 mg= 1 tab(s), Oral, Daily, 3 refills  propranolol 20 mg oral tablet, 20 mg= 1 tab(s), Oral, BID, 3 refills  Rolling Walker, See Instructions,? ?Unable to obtain: pt stated Last Dose Date/Time Unknown  thiamine 100 mg oral tablet, 100 mg= 1 tab(s), Oral, Daily, 3 refills  Vitamin B Complex with C and Zinc oral tablet, 1 tab(s), Oral, Daily, 3 refills  Vitamin D3 50,000 intl units (1250 mcg) oral capsule, 92599 IntUnit= 1 cap(s), Oral, qWeek,? ?Not taking: pt stated Last Dose Date/Time Unknown  Allergies  No Known Allergies  Social History  Abuse/Neglect  No, No, Yes, 07/29/2021  Alcohol  Current, Beer, 1-2 times per week, Alcohol use interferes with work or home: No. Others hurt by  drinking: No. Household alcohol concerns: No., 03/03/2021  Employment/School  Unemployed, Highest education level: High school., 07/29/2021  Exercise  Exercise duration: 60. Exercise frequency: Daily. Exercise type: Walking., 07/29/2021  Financial/Legal Situation  None, Salary patient, 03/03/2021  Home/Environment  Lives with sister. Living situation: Home/Independent. Walker/Cane, TV/Computer concerns: No. Single family house, 07/29/2021    Never in , 03/03/2021  Nutrition/Health  Regular, Fair, 07/29/2021  Sexual  Spiritual/Cultural  Rastafari, Yes, 01/13/2020  Substance Use - Denies Substance Abuse, 12/07/2015  Past, Marijuana, Drug use interferes with work/home: No., 12/18/2019  Tobacco - Denies Tobacco Use, 08/26/2012  Never (less than 100 in lifetime), N/A, 07/29/2021  Family History  Family history is negative  Immunizations  Vaccine Date Status   COVID-19 MRNA, LNP-S, PF- Pfizer 06/17/2021 Given   COVID-19 MRNA, LNP-S, PF- Pfizer 05/28/2021 Given   tetanus-diphtheria toxoids 08/26/2012 Given   Health Maintenance  Health Maintenance  ???Pending?(in the next year)  ??? ??OverDue  ??? ? ? ?Influenza Vaccine due??10/01/20??and every 1??day(s)  ??? ? ? ?Colorectal Screening due??10/19/20??and every 1??year(s)  ??? ??Due?  ??? ? ? ?Aspirin Therapy for CVD Prevention due??07/29/21??and every 1??year(s)  ??? ? ? ?Zoster Vaccine due??07/29/21??Unknown Frequency  ??? ??Due In Future?  ??? ? ? ?Obesity Screening not due until??01/01/22??and every 1??year(s)  ??? ? ? ?Alcohol Misuse Screening not due until??01/02/22??and every 1??year(s)  ??? ? ? ?Diabetes Screening not due until??02/22/22??and every 1??year(s)  ??? ? ? ?Hypertension Management-BMP not due until??02/22/22??and every 1??year(s)  ??? ? ? ?ADL Screening not due until??03/03/22??and every 1??year(s)  ??? ? ? ?Hypertension Management-Education not due until??03/11/22??and every 1??year(s)  ???Satisfied?(in the past 1 year)  ???  ??Satisfied?  ??? ? ? ?ADL Screening on??03/03/21.??Satisfied by Melanie Elizalde LPN  ??? ? ? ?Alcohol Misuse Screening on??03/11/21.??Satisfied by Ry Barton MD  ??? ? ? ?Blood Pressure Screening on??07/29/21.??Satisfied by Payton Zamora RN  ??? ? ? ?Body Mass Index Check on??07/29/21.??Satisfied by Payton Zamora RN  ??? ? ? ?Depression Screening on??07/29/21.??Satisfied by Payton Zamora RN  ??? ? ? ?Diabetes Screening on??02/22/21.??Satisfied by Irasema Cuevas  ??? ? ? ?Hypertension Management-Blood Pressure on??07/29/21.??Satisfied by Payton Zamora RN  ??? ? ? ?Hypertension Management-Education on??03/11/21.??Satisfied by Ry Barton MD  ??? ? ? ?Hypertension Management-BMP on??02/22/21.??Satisfied by Irasema Cuevas  ??? ? ? ?Influenza Vaccine on??03/11/21.??Satisfied by Laura Dunham LPN  ??? ? ? ?Obesity Screening on??07/29/21.??Satisfied by Payton Zamora RN  ??? ??Refused?  ??? ? ? ?Zoster Vaccine on??07/29/21.??Recorded by Payton Zamora RN??Reason: Patient Refuses  ?

## 2022-05-03 NOTE — HISTORICAL [facility]
This is a historical note converted from Mayte. Formatting and pictures may have been removed.  Please reference Mayte for original formatting and attached multimedia. Chief Complaint  Follow up- med refills  History of Present Illness  52 year old AAM here today for F/u. HTN at goal. Alcohol abuse pt is drinking heavy here today with tremors, pt states tremors are all the time even when he drinks.  Review of Systems  All Systems Negative Except: See HPI  Physical Exam  Vitals & Measurements  T:?37.3? ?C (Oral)? HR:?95(Peripheral)? RR:?20? BP:?138/83?  HT:?175?cm? HT:?175?cm? WT:?85.8?kg? WT:?85.8?kg? BMI:?28.02?  General:? Alert and oriented, No acute distress, General health good  Eye:? Pupils are equal, round and reactive to light, Normal conjunctiva.?  HENT:? Normocephalic, Normal hearing, Oral mucosa is moist, No pharyngeal erythema.?  Neck:? Supple, Non-tender, No carotid bruit, No jugular venous distention, No lymphadenopathy, No thyromegaly.?  Respiratory:? Lungs are clear to auscultation, Respirations are non-labored, Breath sounds are equal, Symmetrical chest wall expansion.?  Cardiovascular:? Normal rate, Regular rhythm, No murmur, Good pulses equal in all extremities, Normal peripheral perfusion, No edema.?  Gastrointestinal:? Soft, Non-tender, Non-distended, Normal bowel sounds, No organomegaly.?  Musculoskeletal: Normal range of motion, Normal strength, No tenderness, No deformity, Normal gait.?  Integumentary:? Warm, Dry.?  Neurologic:? Alert, Oriented.?  Psychiatric:? Cooperative, Appropriate mood & affect.?  ?  ?  ?  Assessment/Plan  Alcohol abuse  Discussed rehab with Pt he refused  Ordered:  Ammonia Level, Routine collect, *Est. 03/12/18 3:00:00 CDT, Blood, Order for future visit, *Est. Stop date 03/12/18 3:00:00 CDT, Lab Collect, Alcohol abuse  Elevated liver enzymes, 03/12/18 9:10:00 CDT  CT Head W/O Contrast, Routine, *Est. 03/19/18 3:00:00 CDT, Other (please specify), tremor, None,  Ambulatory, Rad Type, Order for future visit, Tremors of nervous system  Alcohol abuse, Schedule this test, [facility], *Est. 03/19/18 3:00:00 CDT  Folate Level, Routine collect, *Est. 03/12/18 3:00:00 CDT, Blood, Order for future visit, *Est. Stop date 03/12/18 3:00:00 CDT, Lab Collect, Alcohol abuse  Elevated liver enzymes, 03/12/18 9:09:00 CDT  Office/Outpatient Visit Level 4 Established [id number] PC, HTN - Hypertension  Hematuria  Alcohol abuse  Elevated liver enzymes  Elevated serum protein level, [facility], 03/12/18 9:17:00 CDT  Vitamin B12 Level, Routine collect, *Est. 03/12/18 3:00:00 CDT, Blood, Order for future visit, *Est. Stop date 03/12/18 3:00:00 CDT, Lab Collect, Alcohol abuse  Elevated liver enzymes, 03/12/18 9:09:00 CDT  ?  Elevated liver enzymes  Due to alcohol use  Ordered:  Ammonia Level, Routine collect, *Est. 03/12/18 3:00:00 CDT, Blood, Order for future visit, *Est. Stop date 03/12/18 3:00:00 CDT, Lab Collect, Alcohol abuse  Elevated liver enzymes, 03/12/18 9:10:00 CDT  Folate Level, Routine collect, *Est. 03/12/18 3:00:00 CDT, Blood, Order for future visit, *Est. Stop date 03/12/18 3:00:00 CDT, Lab Collect, Alcohol abuse  Elevated liver enzymes, 03/12/18 9:09:00 CDT  Office/Outpatient Visit Level 4 Established [id number] PC, HTN - Hypertension  Hematuria  Alcohol abuse  Elevated liver enzymes  Elevated serum protein level, [facility], 03/12/18 9:17:00 CDT  Vitamin B12 Level, Routine collect, *Est. 03/12/18 3:00:00 CDT, Blood, Order for future visit, *Est. Stop date 03/12/18 3:00:00 CDT, Lab Collect, Alcohol abuse  Elevated liver enzymes, 03/12/18 9:09:00 CDT  ?  Elevated serum protein level  Serum electrophoresis today  Ordered:  Office/Outpatient Visit Level 4 Established [id number] PC, HTN - Hypertension  Hematuria  Alcohol abuse  Elevated liver enzymes  Elevated serum protein level, [facility], 03/12/18 9:17:00 CDT  Protein Electrophoresis w/Interp  Serum-[facility] [id number], Routine collect, 03/12/18 9:02:00 CDT, Blood, Order for future visit, Stop date 03/12/18 9:02:00 CDT, Lab Collect, Elevated serum protein level, 03/12/18 9:02:00 CDT  ?  Hematuria  Urine pathology  Ordered:  Office/Outpatient Visit Level 4 Established [id number] PC, HTN - Hypertension  Hematuria  Alcohol abuse  Elevated liver enzymes  Elevated serum protein level, [facility], 03/12/18 9:17:00 CDT  Pathology Non-Gyn Request [facility], *Est. 03/12/18 3:00:00 CDT, Routine, Order for future visit, AP Specimen, urine, hematuria, Nurse Collect, Print Label, RT - Routine, [id number], Hold Until Collected, Hematuria, *Est. 03/12/18 3:00:00 CDT  ?  HTN - Hypertension  At goal  Low sodium diet (Dash Diet)  Continue Medications as prescribed ([facility])  Monitor Blood daily, report any consistent numbers greater than 140/90  RTC 6 month  Maintain healthy weight  Ordered:  Clinic Follow up, *Est. 09/12/18 3:00:00 CDT, 6 month F/U, Order for future visit, HTN - Hypertension, [facility]  Office/Outpatient Visit Level 4 Established [id number] PC, HTN - Hypertension  Hematuria  Alcohol abuse  Elevated liver enzymes  Elevated serum protein level, [facility], 03/12/18 9:17:00 CDT  ?  Tremors of nervous system  CT head  Ordered:  CT Head W/O Contrast, Routine, *Est. 03/19/18 3:00:00 CDT, Other (please specify), tremor, None, Ambulatory, Rad Type, Order for future visit, Tremors of nervous system  Alcohol abuse, Schedule this test, [facility], *Est. 03/19/18 3:00:00 CDT  ?   Problem List/Past Medical History  Ongoing  HTN - Hypertension  Historical  No qualifying data  Procedure/Surgical History  none.  Medications  Lotrisone 1%-0.05% topical cream, 1 juan a, TOP, BID  Norvasc 5 mg oral tablet, 5 mg= 1 tab(s), Oral, Daily, 1 refills  triamcinolone 0.1% topical ointment, 1 juan a, TOP, TID, 1 refills  Allergies  No Known Allergies  Social History  Alcohol  Current, Liquor, Several times per day,  12/07/2015  Employment/School  Unemployed, 03/12/2018  Exercise  Home/Environment  Lives with Siblings. Living situation: Home/Independent. Alcohol abuse in household: Yes. Substance abuse in household: No. Smoker in household: No. Injuries/Abuse/Neglect in household: No. Family/Friends available for support: Yes. Concern for family members at home: No. Major illness in household: No. Financial concerns: No. TV/Computer concerns: No., 03/12/2018  Nutrition/Health  Regular, Wants to lose weight: Yes. Sleeping concerns: No. Feels highly stressed: No., 03/12/2018  Sexual  Substance Abuse - Denies Substance Abuse, 12/07/2015  Never, 03/12/2018  Tobacco - Denies Tobacco Use, 08/26/2012  Never smoker, 02/08/2017  Family History  Family history is negative  Immunizations  Vaccine Date Status   tetanus-diphtheria toxoids 08/26/2012 Given

## 2022-06-10 ENCOUNTER — HOSPITAL ENCOUNTER (EMERGENCY)
Facility: HOSPITAL | Age: 57
Discharge: HOME | End: 2022-06-10
Attending: FAMILY MEDICINE
Payer: MEDICAID

## 2022-06-10 VITALS
HEART RATE: 98 BPM | SYSTOLIC BLOOD PRESSURE: 129 MMHG | TEMPERATURE: 99 F | DIASTOLIC BLOOD PRESSURE: 81 MMHG | RESPIRATION RATE: 20 BRPM | OXYGEN SATURATION: 98 %

## 2022-06-10 DIAGNOSIS — E86.0 MILD DEHYDRATION: Primary | ICD-10-CM

## 2022-06-10 DIAGNOSIS — E83.42 HYPOMAGNESEMIA: ICD-10-CM

## 2022-06-10 DIAGNOSIS — F10.10 ALCOHOL ABUSE: ICD-10-CM

## 2022-06-10 DIAGNOSIS — R53.1 WEAKNESS: ICD-10-CM

## 2022-06-10 DIAGNOSIS — R07.89 FEELING OF CHEST TIGHTNESS: ICD-10-CM

## 2022-06-10 LAB
ALBUMIN SERPL-MCNC: 3.7 GM/DL (ref 3.5–5)
ALBUMIN/GLOB SERPL: 0.8 RATIO (ref 1.1–2)
ALP SERPL-CCNC: 41 UNIT/L (ref 40–150)
ALT SERPL-CCNC: 42 UNIT/L (ref 0–55)
ANISOCYTOSIS BLD QL SMEAR: ABNORMAL
APPEARANCE UR: CLEAR
AST SERPL-CCNC: 92 UNIT/L (ref 5–34)
BACTERIA #/AREA URNS AUTO: ABNORMAL /HPF
BASOPHILS # BLD AUTO: 0.02 X10(3)/MCL (ref 0–0.2)
BASOPHILS NFR BLD AUTO: 0.6 %
BILIRUB UR QL STRIP.AUTO: ABNORMAL MG/DL
BILIRUBIN DIRECT+TOT PNL SERPL-MCNC: 0.7 MG/DL
BUN SERPL-MCNC: 17.8 MG/DL (ref 8.4–25.7)
CALCIUM SERPL-MCNC: 9.8 MG/DL (ref 8.4–10.2)
CASTS URNS MICRO: ABNORMAL /LPF
CHLORIDE SERPL-SCNC: 96 MMOL/L (ref 98–107)
CK SERPL-CCNC: 670 U/L (ref 30–200)
CO2 SERPL-SCNC: 22 MMOL/L (ref 22–29)
COLOR UR AUTO: YELLOW
CREAT SERPL-MCNC: 1.19 MG/DL (ref 0.73–1.18)
EOSINOPHIL # BLD AUTO: 0.01 X10(3)/MCL (ref 0–0.9)
EOSINOPHIL NFR BLD AUTO: 0.3 %
ERYTHROCYTE [DISTWIDTH] IN BLOOD BY AUTOMATED COUNT: 14.4 % (ref 11.5–17)
GLOBULIN SER-MCNC: 4.9 GM/DL (ref 2.4–3.5)
GLUCOSE SERPL-MCNC: 83 MG/DL (ref 74–100)
GLUCOSE UR QL STRIP.AUTO: NORMAL MG/DL
HCT VFR BLD AUTO: 40.1 % (ref 42–52)
HGB BLD-MCNC: 13.9 GM/DL (ref 14–18)
HYALINE CASTS #/AREA URNS LPF: ABNORMAL /LPF
IMM GRANULOCYTES # BLD AUTO: 0.02 X10(3)/MCL (ref 0–0.02)
IMM GRANULOCYTES NFR BLD AUTO: 0.6 % (ref 0–0.43)
KETONES UR QL STRIP.AUTO: ABNORMAL MG/DL
LEUKOCYTE ESTERASE UR QL STRIP.AUTO: NEGATIVE UNIT/L
LYMPHOCYTES # BLD AUTO: 0.43 X10(3)/MCL (ref 0.6–4.6)
LYMPHOCYTES NFR BLD AUTO: 12.3 %
MAGNESIUM SERPL-MCNC: 1.3 MG/DL (ref 1.6–2.6)
MCH RBC QN AUTO: 29.5 PG (ref 27–31)
MCHC RBC AUTO-ENTMCNC: 34.7 MG/DL (ref 33–36)
MCV RBC AUTO: 85.1 FL (ref 80–94)
MONOCYTES # BLD AUTO: 0.32 X10(3)/MCL (ref 0.1–1.3)
MONOCYTES NFR BLD AUTO: 9.1 %
MUCOUS THREADS URNS QL MICRO: ABNORMAL /LPF
NEUTROPHILS # BLD AUTO: 2.7 X10(3)/MCL (ref 2.1–9.2)
NEUTROPHILS NFR BLD AUTO: 77.1 %
NITRITE UR QL STRIP.AUTO: NEGATIVE
NRBC BLD AUTO-RTO: 0 %
PH UR STRIP.AUTO: 5.5 [PH]
PLATELET # BLD AUTO: 76 X10(3)/MCL (ref 130–400)
PLATELET # BLD EST: NORMAL 10*3/UL
PMV BLD AUTO: 12 FL (ref 9.4–12.4)
POTASSIUM SERPL-SCNC: 3.3 MMOL/L (ref 3.5–5.1)
PROT SERPL-MCNC: 8.6 GM/DL (ref 6.4–8.3)
PROT UR QL STRIP.AUTO: ABNORMAL MG/DL
RBC # BLD AUTO: 4.71 X10(6)/MCL (ref 4.7–6.1)
RBC #/AREA URNS AUTO: ABNORMAL /HPF
RBC UR QL AUTO: NEGATIVE UNIT/L
RENAL EPI CELLS #/AREA UR COMP ASSIST: ABNORMAL /HPF
SODIUM SERPL-SCNC: 135 MMOL/L (ref 136–145)
SP GR UR STRIP.AUTO: 1.03
SQUAMOUS #/AREA URNS LPF: ABNORMAL /HPF
TROPONIN I SERPL-MCNC: <0.01 NG/ML (ref 0–0.04)
TROPONIN I SERPL-MCNC: <0.01 NG/ML (ref 0–0.04)
UROBILINOGEN UR STRIP-ACNC: ABNORMAL MG/DL
WBC # SPEC AUTO: 3.5 X10(3)/MCL (ref 4.5–11.5)
WBC #/AREA URNS AUTO: ABNORMAL /HPF

## 2022-06-10 PROCEDURE — 82550 ASSAY OF CK (CPK): CPT | Performed by: PHYSICIAN ASSISTANT

## 2022-06-10 PROCEDURE — 96361 HYDRATE IV INFUSION ADD-ON: CPT

## 2022-06-10 PROCEDURE — 96365 THER/PROPH/DIAG IV INF INIT: CPT

## 2022-06-10 PROCEDURE — 81001 URINALYSIS AUTO W/SCOPE: CPT | Performed by: PHYSICIAN ASSISTANT

## 2022-06-10 PROCEDURE — 80053 COMPREHEN METABOLIC PANEL: CPT | Performed by: PHYSICIAN ASSISTANT

## 2022-06-10 PROCEDURE — 84484 ASSAY OF TROPONIN QUANT: CPT | Performed by: PHYSICIAN ASSISTANT

## 2022-06-10 PROCEDURE — 83735 ASSAY OF MAGNESIUM: CPT | Performed by: PHYSICIAN ASSISTANT

## 2022-06-10 PROCEDURE — 93005 ELECTROCARDIOGRAM TRACING: CPT

## 2022-06-10 PROCEDURE — 36415 COLL VENOUS BLD VENIPUNCTURE: CPT | Performed by: PHYSICIAN ASSISTANT

## 2022-06-10 PROCEDURE — 63600175 PHARM REV CODE 636 W HCPCS: Performed by: PHYSICIAN ASSISTANT

## 2022-06-10 PROCEDURE — 99285 EMERGENCY DEPT VISIT HI MDM: CPT | Mod: 25

## 2022-06-10 PROCEDURE — 85025 COMPLETE CBC W/AUTO DIFF WBC: CPT | Performed by: PHYSICIAN ASSISTANT

## 2022-06-10 RX ORDER — MAGNESIUM SULFATE 1 G/100ML
1 INJECTION INTRAVENOUS
Status: COMPLETED | OUTPATIENT
Start: 2022-06-10 | End: 2022-06-10

## 2022-06-10 RX ORDER — LANOLIN ALCOHOL/MO/W.PET/CERES
100 CREAM (GRAM) TOPICAL
COMMUNITY
Start: 2022-01-11 | End: 2022-06-10 | Stop reason: SDUPTHER

## 2022-06-10 RX ORDER — FOLIC ACID 1 MG/1
1000 TABLET ORAL DAILY
COMMUNITY
Start: 2022-03-17 | End: 2022-06-10 | Stop reason: SDUPTHER

## 2022-06-10 RX ORDER — PROPRANOLOL HYDROCHLORIDE 20 MG/1
20 TABLET ORAL 2 TIMES DAILY
COMMUNITY
Start: 2022-04-05 | End: 2022-06-10 | Stop reason: SDUPTHER

## 2022-06-10 RX ORDER — LANOLIN ALCOHOL/MO/W.PET/CERES
100 CREAM (GRAM) TOPICAL DAILY
Qty: 30 TABLET | Refills: 2 | Status: SHIPPED | OUTPATIENT
Start: 2022-06-10 | End: 2022-06-24 | Stop reason: SDUPTHER

## 2022-06-10 RX ORDER — ERGOCALCIFEROL (VITAMIN D2) 50 MCG
50 CAPSULE ORAL
COMMUNITY
Start: 2022-01-11 | End: 2022-12-12 | Stop reason: ALTCHOICE

## 2022-06-10 RX ORDER — DISULFIRAM 250 MG/1
250 TABLET ORAL DAILY
COMMUNITY
Start: 2022-01-11 | End: 2022-06-24 | Stop reason: SDUPTHER

## 2022-06-10 RX ORDER — FOLIC ACID 1 MG/1
1000 TABLET ORAL DAILY
Qty: 30 TABLET | Refills: 2 | Status: SHIPPED | OUTPATIENT
Start: 2022-06-10 | End: 2022-06-24 | Stop reason: SDUPTHER

## 2022-06-10 RX ORDER — PROPRANOLOL HYDROCHLORIDE 20 MG/1
20 TABLET ORAL 2 TIMES DAILY
Qty: 60 TABLET | Refills: 2 | Status: SHIPPED | OUTPATIENT
Start: 2022-06-10 | End: 2022-06-24 | Stop reason: SDUPTHER

## 2022-06-10 RX ADMIN — SODIUM CHLORIDE, POTASSIUM CHLORIDE, SODIUM LACTATE AND CALCIUM CHLORIDE 1000 ML: 600; 310; 30; 20 INJECTION, SOLUTION INTRAVENOUS at 01:06

## 2022-06-10 RX ADMIN — SODIUM CHLORIDE, POTASSIUM CHLORIDE, SODIUM LACTATE AND CALCIUM CHLORIDE 1000 ML: 600; 310; 30; 20 INJECTION, SOLUTION INTRAVENOUS at 11:06

## 2022-06-10 RX ADMIN — MAGNESIUM SULFATE IN DEXTROSE 1 G: 10 INJECTION, SOLUTION INTRAVENOUS at 01:06

## 2022-06-10 NOTE — ED PROVIDER NOTES
Encounter Date: 6/10/2022       History     Chief Complaint   Patient presents with    Fatigue     C/o needs med refill for antihypertensive. States has weakness in legs due to back pain  x 1 week.     Back Pain    Medication Refill     Patient is a 56 year old male who presents to the ED with generalized weakness, lower back pain and bi leg pain.  He also is requesting a refill on his BP medication, stating he ran out.  He states he hasn't been feeling well and believes he needs IV fluids due to his daily heavy alcohol consumption. He states he does not have current chest pain but sometimes he does.      The history is provided by the patient.     Review of patient's allergies indicates:  No Known Allergies  Past Medical History:   Diagnosis Date    Hypertension      History reviewed. No pertinent surgical history.  History reviewed. No pertinent family history.  Social History     Tobacco Use    Smoking status: Current Every Day Smoker     Packs/day: 0.50    Smokeless tobacco: Never Used   Substance Use Topics    Alcohol use: Yes     Comment: daily    Drug use: Never     Review of Systems   Constitutional: Positive for fatigue. Negative for chills and fever.        Generalized weakness   HENT: Negative.    Eyes: Negative.    Respiratory: Negative for cough and shortness of breath.    Cardiovascular: Negative for chest pain.   Gastrointestinal: Negative for abdominal pain, nausea and vomiting.   Genitourinary: Negative.    Musculoskeletal: Positive for back pain (lower).        Ni lower leg   Skin: Negative.    Neurological: Positive for light-headedness. Negative for dizziness and headaches.   Hematological: Negative.        Physical Exam     Initial Vitals [06/10/22 0958]   BP Pulse Resp Temp SpO2   (!) 143/97 108 20 98.8 °F (37.1 °C) 96 %      MAP       --         Physical Exam    Nursing note and vitals reviewed.  Constitutional: He appears well-developed and well-nourished.   HENT:   Right Ear:  External ear normal.   Left Ear: External ear normal.   Nose: Nose normal.   Mouth/Throat: Oropharynx is clear and moist.   Eyes: Conjunctivae are normal.   Neck: Neck supple.   Normal range of motion.  Cardiovascular: Normal rate, normal heart sounds and intact distal pulses.   Pulmonary/Chest: Breath sounds normal.   Abdominal: Abdomen is soft. Bowel sounds are normal. There is no abdominal tenderness.   Musculoskeletal:         General: Normal range of motion.      Cervical back: Normal range of motion and neck supple.     Neurological: He is alert and oriented to person, place, and time. He has normal strength. No cranial nerve deficit. GCS score is 15. GCS eye subscore is 4. GCS verbal subscore is 5. GCS motor subscore is 6.   Skin: Skin is warm. Capillary refill takes less than 2 seconds.         ED Course   Procedures  Labs Reviewed   URINALYSIS, REFLEX TO URINE CULTURE - Abnormal; Notable for the following components:       Result Value    Protein, UA 1+ (*)     Ketones, UA 2+ (*)     Bilirubin, UA 1+ (*)     Urobilinogen, UA 1+ (*)     Squamous Epithelial Cells, UA Trace (*)     Renal Epithelial Cells, UA Trace (*)     Mucous, UA Many (*)     Unclassified Cast, UA 0-2 (*)     All other components within normal limits   COMPREHENSIVE METABOLIC PANEL - Abnormal; Notable for the following components:    Sodium Level 135 (*)     Potassium Level 3.3 (*)     Chloride 96 (*)     Creatinine 1.19 (*)     Protein Total 8.6 (*)     Globulin 4.9 (*)     Albumin/Globulin Ratio 0.8 (*)     Aspartate Aminotransferase 92 (*)     All other components within normal limits   CK - Abnormal; Notable for the following components:    Creatine Kinase 670 (*)     All other components within normal limits   CBC WITH DIFFERENTIAL - Abnormal; Notable for the following components:    WBC 3.5 (*)     Hgb 13.9 (*)     Hct 40.1 (*)     Platelet 76 (*)     Lymph # 0.43 (*)     IG# 0.02 (*)     IG% 0.6 (*)     All other components within  normal limits   BLOOD SMEAR MICROSCOPIC EXAM ([facility]) - Abnormal; Notable for the following components:    Anisocyte 1+ (*)     All other components within normal limits   MAGNESIUM - Abnormal; Notable for the following components:    Magnesium Level 1.30 (*)     All other components within normal limits   TROPONIN I - Normal   TROPONIN I - Normal   CBC W/ AUTO DIFFERENTIAL    Narrative:     The following orders were created for panel order CBC Auto Differential.  Procedure                               Abnormality         Status                     ---------                               -----------         ------                     CBC with Differential[[id number]]        Abnormal            Final result                 Please view results for these tests on the individual orders.     EKG Readings: (Independently Interpreted)   Rhythm: Normal Sinus Rhythm. Heart Rate: 97. T Waves Flipped: III. Axis: Left Axis Deviation.     ECG Results          EKG 12-lead (In process)  Result time 06/10/22 11:22:23    In process by Interface, Lab In [facility] (06/10/22 11:22:23)                 Narrative:    Test Reason : R53.1,    Vent. Rate : 097 BPM     Atrial Rate : 097 BPM     P-R Int : 156 ms          QRS Dur : 088 ms      QT Int : 342 ms       P-R-T Axes : 076 -36 017 degrees     QTc Int : 434 ms    Normal sinus rhythm  Left axis deviation  Minimal voltage criteria for LVH, may be normal variant  Cannot rule out Anterior infarct ,age undetermined  Abnormal ECG  No previous ECGs available    Referred By: [email or web address]   SELF           Confirmed By:                             Imaging Results          X-Ray Chest PA And Lateral (Final result)  Result time 06/10/22 11:59:54    Final result by Scar Vigil MD (06/10/22 11:59:54)                 Impression:        No convincing acute radiographic abnormality.      Electronically signed by: Scar Vigil  Date:    06/10/2022  Time:    11:59             Narrative:    EXAMINATION:  XR  CHEST PA AND LATERAL    CLINICAL HISTORY:  Other chest pain;    TECHNIQUE:  PA and lateral views of the chest.    COMPARISON:  9 August 2019    FINDINGS:  Lines/tubes/devices: none present    The cardiomediastinal silhouette and central pulmonary vasculature are unremarkable contour and size.  The trachea is midline. There is no lobar consolidation, pleural effusion, or pneumothorax.    There is no acute osseous or extrathoracic abnormality.                                 X-Ray Lumbar Spine Ap And Lateral (Final result)  Result time 06/10/22 11:56:27    Final result by Caridad Casillas MD (06/10/22 11:56:27)                 Impression:      1. No acute abnormality identified.  2. Multilevel degenerative changes of the lumbar spine.      Electronically signed by: Caridad Casillas  Date:    06/10/2022  Time:    11:56             Narrative:    EXAMINATION:  XR LUMBAR SPINE AP AND LATERAL    CLINICAL HISTORY:  lower back pain;    COMPARISON:  None.    FINDINGS:  There are 5 non-rib-bearing lumbar type vertebral bodies.  There is minimal anterolisthesis of L4 over L5.  The vertebral body heights are maintained.  There is mild disc height loss at L4-5 with small multilevel marginal osteophytes.  There is moderate facet arthropathy in the lower lumbar spine.  The soft tissues are unremarkable.                                 Medications   lactated ringers bolus 1,000 mL (0 mLs Intravenous Stopped 6/10/22 1227)   lactated ringers bolus 1,000 mL (0 mLs Intravenous Stopped 6/10/22 1439)   magnesium sulfate in dextrose IVPB (premix) 1 g (0 g Intravenous Stopped 6/10/22 1445)     Medical Decision Making:   Clinical Tests:   Lab Tests: Ordered and Reviewed  The following lab test(s) were unremarkable: CBC, CMP, Urinalysis and Troponin  Radiological Study: Ordered and Reviewed  ED Management:  The patient is resting comfortably and in no acute distress.  He states that his symptoms have improved after treatment in Emergency  Department. I personally discussed his test results and treatment plan.  Gave strict ED precautions.  Specific conditions for return to the emergency department and importance of follow up with pcp.  Patient voices understanding and agrees to the plan discussed. All patients' questions have been answered at this time. He has remained hemodynamically stable throughout entire stay in ED and is stable for discharge home.  Other:   I have discussed this case with another health care provider.       <> Summary of the Discussion: Recheck troponin at 3:00 due to new T was inversion in lead III  I discussed this patient with Dr. Meraz             ED Course as of 06/10/22 1610   Fri Jalil 10, 2022   1241 X-Ray Chest PA And Lateral  No convincing acute radiographic abnormality. [ER]   1241 X-Ray Lumbar Spine Ap And Lateral  There are 5 non-rib-bearing lumbar type vertebral bodies.  There is minimal anterolisthesis of L4 over L5.  The vertebral body heights are maintained.  There is mild disc height loss at L4-5 with small multilevel marginal osteophytes.  There is moderate facet arthropathy in the lower lumbar spine.  The soft tissues are unremarkable [ER]   1332 Magnesium(!): 1.30  IV Mag given [ER]   1332 Creatinine(!): 1.19  2 L IV fluid given [ER]   1332 AST(!): 92 [ER]   1332 CPK(!): 670 [ER]   1335 Troponin I: <0.010 [ER]   1335 Potassium(!): 3.3 [ER]   1335 Chloride(!): 96 [ER]   1335 Sodium(!): 135 [ER]   1519 Ketones, UA(!): 2+ [ER]   1549 Troponin I: <0.010  No increase since Trop three hours prior [ER]      ED Course User Index  [ER] KATERYNA Castro             Clinical Impression:   Final diagnoses:  [R53.1] Weakness  [R07.89] Feeling of chest tightness  [E86.0] Mild dehydration (Primary)  [F10.10] Alcohol abuse  [E83.42] Hypomagnesemia          ED Disposition Condition    Discharge Stable        ED Prescriptions     Medication Sig Dispense Start Date End Date Auth. Provider    propranoloL (INDERAL) 20 MG tablet  Take 1 tablet (20 mg total) by mouth 2 (two) times daily. 60 tablet 6/10/2022 7/10/2022 KATERYNA Castro    folic acid (FOLVITE) 1 MG tablet Take 1 tablet (1,000 mcg total) by mouth once daily. 30 tablet 6/10/2022 7/10/2022 KATERYNA Castro    thiamine 100 MG tablet Take 1 tablet (100 mg total) by mouth once daily. 30 tablet 6/10/2022 7/10/2022 KATERYNA Castro        Follow-up Information     Follow up With Specialties Details Why Contact Info    [facility] - Emergency Dept Emergency Medicine  As needed, If symptoms worsen [address] [facility] [id number] [phone]           KATERYNA Castro  06/10/22 4022       KATERYNA Castro  06/10/22 5654

## 2022-06-22 ENCOUNTER — HOSPITAL ENCOUNTER (EMERGENCY)
Facility: HOSPITAL | Age: 57
Discharge: HOME | End: 2022-06-22
Attending: FAMILY MEDICINE
Payer: MEDICAID

## 2022-06-22 VITALS
WEIGHT: 195.44 LBS | RESPIRATION RATE: 14 BRPM | OXYGEN SATURATION: 100 % | BODY MASS INDEX: 28.95 KG/M2 | SYSTOLIC BLOOD PRESSURE: 145 MMHG | DIASTOLIC BLOOD PRESSURE: 79 MMHG | TEMPERATURE: 99 F | HEIGHT: 69 IN | HEART RATE: 64 BPM

## 2022-06-22 DIAGNOSIS — B35.4 TINEA CORPORIS: Primary | ICD-10-CM

## 2022-06-22 PROCEDURE — 99284 EMERGENCY DEPT VISIT MOD MDM: CPT | Mod: 25

## 2022-06-22 RX ORDER — FLUCONAZOLE 200 MG/1
200 TABLET ORAL WEEKLY
Qty: 2 TABLET | Refills: 0 | Status: SHIPPED | OUTPATIENT
Start: 2022-06-22 | End: 2022-10-31 | Stop reason: ALTCHOICE

## 2022-06-22 RX ORDER — CLOTRIMAZOLE 1 %
CREAM (GRAM) TOPICAL
Qty: 85 G | Refills: 0 | Status: SHIPPED | OUTPATIENT
Start: 2022-06-22 | End: 2022-10-31

## 2022-06-22 NOTE — ED PROVIDER NOTES
Encounter Date: 6/22/2022       History     Chief Complaint   Patient presents with    Rash     Pt in with reports of rash to bilateral arms for over 1  month.     Patient is a 56-year-old male presents to the ED rash to bilateral forearms for greater than a month.  He states the rash began just a few circular lesions that have multiplied throughout the month.  He denies itching, drainage, pain, fever, chills, nausea, vomiting.  He has not tried any medications for rash.    The history is provided by the patient. No  was used.   Rash   This is a new problem. The current episode started several weeks ago. The problem has been gradually worsening. The problem is associated with nothing. Affected Location: bi forearms. The pain is at a severity of 0/10. Pertinent negatives include no blisters, no itching, no pain and no weeping. He has tried nothing for the symptoms.     Review of patient's allergies indicates:  No Known Allergies  Past Medical History:   Diagnosis Date    Hypertension      No past surgical history on file.  No family history on file.  Social History     Tobacco Use    Smoking status: Current Every Day Smoker     Packs/day: 0.50    Smokeless tobacco: Never Used   Substance Use Topics    Alcohol use: Yes     Comment: daily    Drug use: Never     Review of Systems   Constitutional: Negative for chills and fever.   HENT: Negative.    Eyes: Negative.    Respiratory: Negative for cough and shortness of breath.    Cardiovascular: Negative for chest pain, palpitations and leg swelling.   Gastrointestinal: Negative for nausea and vomiting.   Genitourinary: Negative.    Musculoskeletal: Negative for arthralgias and myalgias.   Skin: Positive for rash (bi forearms). Negative for itching.   Neurological: Negative for dizziness and headaches.   Hematological: Negative.        Physical Exam     Initial Vitals [06/22/22 0753]   BP Pulse Resp Temp SpO2   (!) 145/79 64 14 98.6 °F (37 °C) 100  %      MAP       --         Physical Exam    Nursing note and vitals reviewed.  Constitutional: He appears well-developed and well-nourished.   HENT:   Nose: Nose normal.   Mouth/Throat: Oropharynx is clear and moist.   Eyes: Conjunctivae are normal.   Neck:   Normal range of motion.  Cardiovascular: Normal rate, normal heart sounds and intact distal pulses.   Pulmonary/Chest: Breath sounds normal.   Abdominal: Abdomen is soft. Bowel sounds are normal.   Musculoskeletal:         General: Normal range of motion.      Cervical back: Normal range of motion.     Neurological: He is alert.   Skin: Skin is warm. Capillary refill takes less than 2 seconds. Rash (scaly, circular lesions on bi forearms, consistent with fungal rash) noted.         ED Course   Procedures  Labs Reviewed - No data to display       Imaging Results    None          Medications - No data to display  Medical Decision Making:   ED Management:  The patient is resting comfortably and in no acute distress.  I personally discussed his treatment plan.  Gave strict ED precautions.  Specific conditions for return to the emergency department and importance of follow up with pcp.  Patient voices understanding and agrees to the plan discussed. All patients' questions have been answered at this time. He has remained hemodynamically stable throughout entire stay in ED and is stable for discharge home.                      Clinical Impression:   Final diagnoses:  [B35.4] Tinea corporis (Primary)          ED Disposition Condition    Discharge Stable        ED Prescriptions     Medication Sig Dispense Start Date End Date Auth. Provider    clotrimazole (LOTRIMIN) 1 % cream Apply to affected area 2 times daily 85 g 6/22/2022 7/22/2022 KATERYNA Castro    fluconazole (DIFLUCAN) 200 MG Tab Take 1 tablet (200 mg total) by mouth once a week. 2 tablet 6/22/2022  KATERYNA Castro        Follow-up Information     Follow up With Specialties Details Why Contact Info     [facility] - Emergency Dept Emergency Medicine  As needed, If symptoms worsen [address] [facility] [id number] [phone]           KATERYNA Castro  06/22/22 6160

## 2022-06-24 ENCOUNTER — OFFICE VISIT (OUTPATIENT)
Dept: INTERNAL MEDICINE | Facility: CLINIC | Age: 57
End: 2022-06-24
Payer: MEDICAID

## 2022-06-24 VITALS
HEIGHT: 69 IN | RESPIRATION RATE: 18 BRPM | DIASTOLIC BLOOD PRESSURE: 85 MMHG | HEART RATE: 79 BPM | TEMPERATURE: 98 F | BODY MASS INDEX: 28.8 KG/M2 | WEIGHT: 194.44 LBS | SYSTOLIC BLOOD PRESSURE: 133 MMHG

## 2022-06-24 DIAGNOSIS — F10.10 ALCOHOL ABUSE: Primary | ICD-10-CM

## 2022-06-24 DIAGNOSIS — I10 HYPERTENSION, UNSPECIFIED TYPE: ICD-10-CM

## 2022-06-24 PROCEDURE — 90750 HZV VACC RECOMBINANT IM: CPT | Mod: PBBFAC

## 2022-06-24 PROCEDURE — 99213 OFFICE O/P EST LOW 20 MIN: CPT | Mod: PBBFAC

## 2022-06-24 RX ORDER — DISULFIRAM 250 MG/1
250 TABLET ORAL DAILY
Qty: 90 TABLET | Refills: 2 | Status: SHIPPED | OUTPATIENT
Start: 2022-06-24 | End: 2022-09-22 | Stop reason: SDUPTHER

## 2022-06-24 RX ORDER — PROPRANOLOL HYDROCHLORIDE 20 MG/1
20 TABLET ORAL 2 TIMES DAILY
COMMUNITY
Start: 2022-01-11 | End: 2022-06-24 | Stop reason: SDUPTHER

## 2022-06-24 RX ORDER — PROPRANOLOL HYDROCHLORIDE 20 MG/1
20 TABLET ORAL 2 TIMES DAILY
Qty: 60 TABLET | Refills: 2 | Status: SHIPPED | OUTPATIENT
Start: 2022-06-24 | End: 2022-09-22 | Stop reason: SDUPTHER

## 2022-06-24 RX ORDER — ASPIRIN 325 MG/1
100 TABLET, FILM COATED ORAL DAILY
COMMUNITY
Start: 2022-06-10 | End: 2022-06-24 | Stop reason: SDUPTHER

## 2022-06-24 RX ORDER — FOLIC ACID 1 MG/1
1000 TABLET ORAL DAILY
Qty: 30 TABLET | Refills: 2 | Status: SHIPPED | OUTPATIENT
Start: 2022-06-24 | End: 2022-09-07 | Stop reason: SDUPTHER

## 2022-06-24 RX ORDER — LANOLIN ALCOHOL/MO/W.PET/CERES
100 CREAM (GRAM) TOPICAL DAILY
Qty: 30 TABLET | Refills: 2 | Status: SHIPPED | OUTPATIENT
Start: 2022-06-24 | End: 2022-07-24

## 2022-06-24 NOTE — ASSESSMENT & PLAN NOTE
Advised on alcohol cessation  States he has not been drinking since his previous ER visit which was 1 week ago  Barriers to quitting: friends continue to invite him out to drink, patient also endorses cravings  States he will only drink on Saturdays instead of everyday  Continue disulfiram 250mg daily  Advised to find AA meetings

## 2022-06-24 NOTE — ASSESSMENT & PLAN NOTE
BP well controlled in clinic today  Current regimen: propranolol 20mg BID  Also has issue with tremor in his hands  Continue this regimen  Advised on DASH diet

## 2022-06-24 NOTE — PROGRESS NOTES
Attending Physician Addendum  Pt seen and discussed with medicine resident in clinic  Care provided is appropriate  Pt reports decreased ETOH use  Agree with above ASSESSMENT AND PLAN

## 2022-06-24 NOTE — PROGRESS NOTES
"[facility]ICINE [facility]IDENT CLINIC  CLINIC NOTE    Patient Name: Ger Denton  YOB: 1965  Chief Complaint: Follow-up, Back Pain, and Headache     PRESENTING HISTORY   History of Present Illness:  Mr. Ger Denton is a 56 y.o. male w/ PMH of alcoholism, hypertension, vitamin D deficiency who presents to internal medicine clinic for 3 month follow up.  He continues to drink when he is out with his friends, unable to quantify how much.  He states he is trying to stop.  He was in the emergency department for dehydration about 1 week prior and has not been drinking since that time.  He is unable to tell me which medications he takes but reports good compliance.  He otherwise has no issues.      Review of Systems:  12 point review of symptoms negative unless otherwise stated above    PAST HISTORY:     Past Medical History:   Diagnosis Date    Hypertension         No past surgical history on file.    No family history on file.    Social History     Socioeconomic History    Marital status: Single   Tobacco Use    Smoking status: Never Smoker    Smokeless tobacco: Never Used   Substance and Sexual Activity    Alcohol use: Yes     Comment: daily    Drug use: Yes     Types: Marijuana     Comment: states "every now and then"    Sexual activity: Not Currently     Social Determinants of Health     Financial Resource Strain: High Risk    Difficulty of Paying Living Expenses: Hard   Food Insecurity: Food Insecurity Present    Worried About Running Out of Food in the Last Year: Sometimes true    Ran Out of Food in the Last Year: Never true   Transportation Needs: No Transportation Needs    Lack of Transportation (Medical): No    Lack of Transportation (Non-Medical): No   Physical Activity: Inactive    Days of Exercise per Week: 0 days    Minutes of Exercise per Session: 0 min   Stress: No Stress Concern Present    Feeling of Stress : Only a little   Social Connections: Socially Isolated    Frequency of " "Communication with Friends and Family: Twice a week    Frequency of Social Gatherings with Friends and Family: Never    Attends [employer] Services: Never    Active Member of Clubs or Organizations: No    Attends Club or Organization Meetings: Never    Marital Status: Never    Housing Stability: Low Risk     Unable to Pay for Housing in the Last Year: No    Number of Places Lived in the Last Year: 1    Unstable Housing in the Last Year: No       MEDICATIONS:     Current Outpatient Medications   Medication Instructions    clotrimazole (LOTRIMIN) 1 % cream Apply to affected area 2 times daily    disulfiram (ANTABUSE) 250 mg, Oral, Daily    ergocalciferol (vitamin D2) 50 mcg, Oral    fluconazole (DIFLUCAN) 200 mg, Oral, Weekly    folic acid (FOLVITE) 1,000 mcg, Oral, Daily    propranoloL (INDERAL) 20 mg, Oral, 2 times daily    propranoloL (INDERAL) 20 mg, Oral, 2 times daily    thiamine mononitrate (vit B1) (VITAMIN B-1 (MONONITRATE)) 100 mg, Oral, Daily    thiamine 100 mg, Oral, Daily        OBJECTIVE:   Vital Signs:  Vitals:    06/24/22 0754   BP: 133/85   Pulse: 79   Resp: 18   Temp: 97.7 °F (36.5 °C)   TempSrc: Oral   Weight: 88.2 kg (194 lb 7.1 oz)   Height: 5' 9" (1.753 m)        Physical Exam:  General: well-developed well-nourished in no acute distress  Eye: PERRLA, EOMI, clear conjunctiva, eyelids normal  HENT: Head-normocephalic and atraumatic  Neck: full range of motion, no thyromegaly or lymphadenopathy, trachea midline, supple, no palpable thyroid nodules  Respiratory: clear to auscultation bilaterally without wheezes, rales, rhonchi  Cardiovascular: regular rate and rhythm without murmurs.  No gallops or rubs no JVD.  Capillary refill within normal limits.  Gastrointestinal: soft, non-tender, non-distended with normal bowel sounds, without masses to palpation  Genitourinary: no CVA tenderness to palpation  Musculoskeletal: full range of motion of all extremities/spine without " limitation or discomfort  Integumentary: no rashes or skin lesions present  Neurologic: no signs of peripheral neurological deficit, motor/sensory function intact  Psychiatric:  alert and oriented, cognitive function intact, cooperative with exam, good eye contact, judgement and insight intact, mood and affect full range.    Laboratory  Lab Results   Component Value Date     (L) 06/10/2022    K 3.3 (L) 06/10/2022    CO2 22 06/10/2022    BUN 17.8 06/10/2022    CREATININE 1.19 (H) 06/10/2022    CALCIUM 9.8 06/10/2022    BILIDIR 0.3 07/29/2021    IBILI 0.30 07/29/2021    ALKPHOS 41 06/10/2022    AST 92 (H) 06/10/2022    ALT 42 06/10/2022    MG 1.30 (L) 06/10/2022    PHOS 3.5 02/22/2021        Lab Results   Component Value Date    WBC 3.5 (L) 06/10/2022    RBC 4.71 06/10/2022    HGB 13.9 (L) 06/10/2022    HCT 40.1 (L) 06/10/2022    MCV 85.1 06/10/2022    MCH 29.5 06/10/2022    MCHC 34.7 06/10/2022    RDW 14.4 06/10/2022    PLT 76 (L) 06/10/2022    MPV 12.0 06/10/2022        Diagnostic Results:  X-Ray Chest PA And Lateral    Result Date: 6/10/2022  EXAMINATION:  XR CHEST PA AND LATERAL    CLINICAL HISTORY:  Other chest pain;    TECHNIQUE:  PA and lateral views of the chest.    COMPARISON:  9 August 2019    FINDINGS:  Lines/tubes/devices: none present    The cardiomediastinal silhouette and central pulmonary vasculature are unremarkable contour and size.  The trachea is midline. There is no lobar consolidation, pleural effusion, or pneumothorax.    There is no acute osseous or extrathoracic abnormality.          X-Ray Lumbar Spine Ap And Lateral    Result Date: 6/10/2022  EXAMINATION:  XR LUMBAR SPINE AP AND LATERAL    CLINICAL HISTORY:  lower back pain;    COMPARISON:  None.    FINDINGS:  There are 5 non-rib-bearing lumbar type vertebral bodies.  There is minimal anterolisthesis of L4 over L5.  The vertebral body heights are maintained.  There is mild disc height loss at L4-5 with small multilevel marginal  osteophytes.  There is moderate facet arthropathy in the lower lumbar spine.  The soft tissues are unremarkable.         No results found in the last 24 hours.     ASSESSMENT & PLAN:     Problem List Items Addressed This Visit        Psychiatric    Alcohol abuse - Primary     Advised on alcohol cessation  States he has not been drinking since his previous ER visit which was 1 week ago  Barriers to quitting: friends continue to invite him out to drink, patient also endorses cravings  States he will only drink on Saturdays instead of everyday  Continue disulfiram 250mg daily  Advised to find AA meetings           Relevant Orders    US Abdomen Limited_Liver       Cardiac/Vascular    Hypertension     BP well controlled in clinic today  Current regimen: propranolol 20mg BID  Also has issue with tremor in his hands  Continue this regimen  Advised on DASH diet                     Health Maintenance/ Wellness  Pneumococcal vaccine: not indicated at this time  TDAP: 8/26/2012, due at next visit  Influenza vaccine: due next flu season  Shingrix vaccine: Given #1 today  Screening colonoscopy: due now, referred to GI  Lung Cancer Screening: not indicated  Hepatitis Panel: 11/27/20  COVID-19:       Counseling:  - Patient instructed to limit alcohol use  - Patient counselled on smoking cessation  - Educated on diet (portion control) and exercise (at least 30 minutes per day)  - Relevant educational materials provided    Labs ordered:   Imaging:   Medications: reconciled, discussed and refills given.  RTC in 3 months    Ry Barton MD  06/24/2022, 8:47 AM

## 2022-07-25 ENCOUNTER — OFFICE VISIT (OUTPATIENT)
Dept: GASTROENTEROLOGY | Facility: CLINIC | Age: 57
End: 2022-07-25
Payer: MEDICAID

## 2022-07-25 VITALS
HEIGHT: 69 IN | RESPIRATION RATE: 18 BRPM | TEMPERATURE: 98 F | BODY MASS INDEX: 27.49 KG/M2 | DIASTOLIC BLOOD PRESSURE: 90 MMHG | SYSTOLIC BLOOD PRESSURE: 145 MMHG | WEIGHT: 185.63 LBS | HEART RATE: 69 BPM

## 2022-07-25 DIAGNOSIS — K70.30 ALCOHOLIC CIRRHOSIS OF LIVER WITHOUT ASCITES: Primary | ICD-10-CM

## 2022-07-25 LAB
ALBUMIN SERPL-MCNC: 3.8 GM/DL (ref 3.5–5)
ALBUMIN/GLOB SERPL: 1 RATIO (ref 1.1–2)
ALP SERPL-CCNC: 70 UNIT/L (ref 40–150)
ALT SERPL-CCNC: 9 UNIT/L (ref 0–55)
AST SERPL-CCNC: 19 UNIT/L (ref 5–34)
BASOPHILS # BLD AUTO: 0.01 X10(3)/MCL (ref 0–0.2)
BASOPHILS NFR BLD AUTO: 0.3 %
BILIRUBIN DIRECT+TOT PNL SERPL-MCNC: 0.5 MG/DL
BUN SERPL-MCNC: 9.1 MG/DL (ref 8.4–25.7)
CALCIUM SERPL-MCNC: 9.6 MG/DL (ref 8.4–10.2)
CHLORIDE SERPL-SCNC: 104 MMOL/L (ref 98–107)
CO2 SERPL-SCNC: 23 MMOL/L (ref 22–29)
CREAT SERPL-MCNC: 1.09 MG/DL (ref 0.73–1.18)
EOSINOPHIL # BLD AUTO: 0.09 X10(3)/MCL (ref 0–0.9)
EOSINOPHIL NFR BLD AUTO: 2.4 %
ERYTHROCYTE [DISTWIDTH] IN BLOOD BY AUTOMATED COUNT: 13.3 % (ref 11.5–17)
GLOBULIN SER-MCNC: 3.9 GM/DL (ref 2.4–3.5)
GLUCOSE SERPL-MCNC: 87 MG/DL (ref 74–100)
HCT VFR BLD AUTO: 39 % (ref 42–52)
HGB BLD-MCNC: 12.6 GM/DL (ref 14–18)
IMM GRANULOCYTES # BLD AUTO: 0.01 X10(3)/MCL (ref 0–0.04)
IMM GRANULOCYTES NFR BLD AUTO: 0.3 %
LYMPHOCYTES # BLD AUTO: 1.07 X10(3)/MCL (ref 0.6–4.6)
LYMPHOCYTES NFR BLD AUTO: 28.4 %
MCH RBC QN AUTO: 29.2 PG (ref 27–31)
MCHC RBC AUTO-ENTMCNC: 32.3 MG/DL (ref 33–36)
MCV RBC AUTO: 90.3 FL (ref 80–94)
MONOCYTES # BLD AUTO: 0.28 X10(3)/MCL (ref 0.1–1.3)
MONOCYTES NFR BLD AUTO: 7.4 %
NEUTROPHILS # BLD AUTO: 2.3 X10(3)/MCL (ref 2.1–9.2)
NEUTROPHILS NFR BLD AUTO: 61.2 %
NRBC BLD AUTO-RTO: 0 %
PLATELET # BLD AUTO: 209 X10(3)/MCL (ref 130–400)
PMV BLD AUTO: 10.6 FL (ref 7.4–10.4)
POTASSIUM SERPL-SCNC: 4.1 MMOL/L (ref 3.5–5.1)
PROT SERPL-MCNC: 7.7 GM/DL (ref 6.4–8.3)
RBC # BLD AUTO: 4.32 X10(6)/MCL (ref 4.7–6.1)
SODIUM SERPL-SCNC: 138 MMOL/L (ref 136–145)
WBC # SPEC AUTO: 3.8 X10(3)/MCL (ref 4.5–11.5)

## 2022-07-25 PROCEDURE — 99214 OFFICE O/P EST MOD 30 MIN: CPT | Mod: PBBFAC | Performed by: STUDENT IN AN ORGANIZED HEALTH CARE EDUCATION/TRAINING PROGRAM

## 2022-07-25 PROCEDURE — 36415 COLL VENOUS BLD VENIPUNCTURE: CPT | Performed by: STUDENT IN AN ORGANIZED HEALTH CARE EDUCATION/TRAINING PROGRAM

## 2022-07-25 PROCEDURE — 85025 COMPLETE CBC W/AUTO DIFF WBC: CPT | Performed by: STUDENT IN AN ORGANIZED HEALTH CARE EDUCATION/TRAINING PROGRAM

## 2022-07-25 PROCEDURE — 85610 PROTHROMBIN TIME: CPT | Performed by: STUDENT IN AN ORGANIZED HEALTH CARE EDUCATION/TRAINING PROGRAM

## 2022-07-25 PROCEDURE — 80053 COMPREHEN METABOLIC PANEL: CPT | Performed by: STUDENT IN AN ORGANIZED HEALTH CARE EDUCATION/TRAINING PROGRAM

## 2022-07-25 RX ORDER — METHOCARBAMOL 750 MG/1
750 TABLET, FILM COATED ORAL 2 TIMES DAILY PRN
COMMUNITY
Start: 2022-07-08 | End: 2022-09-22 | Stop reason: SDUPTHER

## 2022-07-25 RX ORDER — ASPIRIN 325 MG/1
100 TABLET, FILM COATED ORAL DAILY
COMMUNITY
Start: 2022-07-18 | End: 2022-09-07 | Stop reason: SDUPTHER

## 2022-07-25 RX ORDER — DICLOFENAC SODIUM 75 MG/1
75 TABLET, DELAYED RELEASE ORAL 2 TIMES DAILY PRN
COMMUNITY
Start: 2022-07-08 | End: 2022-07-25

## 2022-07-25 NOTE — PROGRESS NOTES
Subjective:       Patient ID: Ger Denton is a 56 y.o. male.    Chief Complaint: Cirrhosis (Alcoholic)    56-year-old male, history of profound alcoholism, presenting to clinic today for initial appointment with me.  Was last seen by his PCP in April 2022, right personally staffed the resident, he at that point had stated he was drinking a 40 oz of beer a day, that it was affecting his lifestyle relationships with others.  Currently unemployed lives with his sister.  He is currently taking disulfiram for and states he has cut back his drinking to a 6 pack on Saturdays and Sundays where he does not take his aforementioned medication.  He recently had an ER visit June 2022 for volume depletion SEUN, was given IV fluids and discharged, however his lab work at that time it showed his platelets had decreased from a baseline of around 123, now at 76.  I had a kelsey discussion with him today in the room but he is already showing signs of decompensation with decreased platelet count now 76, and that today we need to get updated MELD labs to further ascertain his extent of decompensation.  He denies any melena, hematochezia, hematemesis, or jaundice.  Denies any abdominal lower extremity swelling.  He is overdue for updated EGD, on abdominal ultrasound his last abdominal ultrasound performed November 2021 showed hepatic steatosis.  His last MELD of June 2021 was 11, child Jean Baptiste score class A. He is currently taking diclofenac for reasons unknown, discussed with him will be stopping this medication today.      Review of Systems   Constitutional: Negative.    HENT: Negative.    Eyes: Negative.    Respiratory: Negative.    Cardiovascular: Negative.    Gastrointestinal: Negative.    Endocrine: Negative.    Genitourinary: Negative.    Musculoskeletal: Positive for gait problem and leg pain.   Integumentary:  Negative.   Allergic/Immunologic: Negative.    Hematological: Negative.    Psychiatric/Behavioral: Negative.           Objective:  Vitals:    07/25/22 1328   BP: (!) 145/90   Pulse:    Resp:    Temp:            Physical Exam  Constitutional:       Appearance: He is obese.   HENT:      Mouth/Throat:      Mouth: Mucous membranes are moist.      Pharynx: Oropharynx is clear.   Eyes:      Extraocular Movements: Extraocular movements intact.      Conjunctiva/sclera: Conjunctivae normal.      Pupils: Pupils are equal, round, and reactive to light.   Cardiovascular:      Rate and Rhythm: Normal rate and regular rhythm.      Pulses: Normal pulses.      Heart sounds: Normal heart sounds.   Pulmonary:      Effort: Pulmonary effort is normal.      Breath sounds: Normal breath sounds.   Abdominal:      General: Abdomen is flat. Bowel sounds are normal.      Palpations: Abdomen is soft.   Musculoskeletal:         General: Normal range of motion.   Skin:     General: Skin is warm and dry.   Neurological:      General: No focal deficit present.      Mental Status: He is alert and oriented to person, place, and time. Mental status is at baseline.   Psychiatric:         Mood and Affect: Mood normal.         Thought Content: Thought content normal.         Judgment: Judgment normal.         Assessment:       Problem List Items Addressed This Visit    None     Visit Diagnoses     Alcoholic cirrhosis of liver without ascites    -  Primary    Relevant Orders    US Abdomen Limited (LIVER)    Protime-INR    Comprehensive Metabolic Panel    CBC Auto Differential          Plan:         Background:  Alcohol:  Yes, currently drinking 6 pack on the weekends     Tobacco: yes    Liver disease: ETOH    MELD-Na: 11, repeat MELD labs  Child-Jean Baptiste Class: A    Transplant: not under evaluation, low MELD    Cirrhosis is decompensated with:    Ascites: no  Spontaneous bacterial peritonitis: No  Hepatic Encephalopathy: No  Hepatocellular carcinoma: No  Hepatorenal syndrome: No  Hyponatremia: yes  Muscle wasting: No  Portal vein thrombosis: No    Screening:  Last EGD:   Needs baseline screening EGD      Last imaging study:  Abd  November 2020, hepatic steatosis present    CIRRHOSIS COUNSELING:  - strict abstinence of alcohol use  - low sodium (salt) 2000mg per day  - Nutrition: 25-30kcal (calorie per body weight in kilogram) per day  - No need to restrict protein in diet  - High protein diet: 1.2-1.5 gram/kg (protein per body weight in kg) per day to prevent muscle mass loss  - Resistance exercises for muscle strength  - Avoid raw seafoods due to risk of fatal Vibrio vulnificus infection  - Avoid Non-steroidal anti-inflammatory drugs (NSAIDs) such as ibuprofen, Motrin, naproxen, Aleve due to risk of kidney damage  - Can take acetaminophen (tylenol), no more than 2000mg per day  - Compliance with all medications  - Ultrasound or MRI of the liver every 6 months for liver cancer screening  - Upper endoscopy every 1-2 years to screen for varices      Three-month follow-up  Needs HCC screening with abdominal ultrasound to be updated, also needs baseline EGD  Stop diclofenac, recommend stopping Robaxin  Extensive discussion today about alcohol cessation given his dropping platelet count to which he verbalized understanding and realizes he needs to completely abstain.  Getting updated MELD labs today

## 2022-09-07 ENCOUNTER — HOSPITAL ENCOUNTER (EMERGENCY)
Facility: HOSPITAL | Age: 57
Discharge: HOME | End: 2022-09-07
Attending: FAMILY MEDICINE
Payer: MEDICAID

## 2022-09-07 VITALS
TEMPERATURE: 97 F | SYSTOLIC BLOOD PRESSURE: 168 MMHG | BODY MASS INDEX: 26.15 KG/M2 | DIASTOLIC BLOOD PRESSURE: 93 MMHG | WEIGHT: 176.56 LBS | OXYGEN SATURATION: 100 % | RESPIRATION RATE: 16 BRPM | HEIGHT: 69 IN | HEART RATE: 105 BPM

## 2022-09-07 DIAGNOSIS — G44.209 TENSION HEADACHE: Primary | ICD-10-CM

## 2022-09-07 LAB
ALBUMIN SERPL-MCNC: 4.1 GM/DL (ref 3.5–5)
ALBUMIN/GLOB SERPL: 0.9 RATIO (ref 1.1–2)
ALP SERPL-CCNC: 72 UNIT/L (ref 40–150)
ALT SERPL-CCNC: 31 UNIT/L (ref 0–55)
APPEARANCE UR: CLEAR
AST SERPL-CCNC: 59 UNIT/L (ref 5–34)
BACTERIA #/AREA URNS AUTO: ABNORMAL /HPF
BASOPHILS # BLD AUTO: 0.01 X10(3)/MCL (ref 0–0.2)
BASOPHILS NFR BLD AUTO: 0.2 %
BILIRUB UR QL STRIP.AUTO: NEGATIVE MG/DL
BILIRUBIN DIRECT+TOT PNL SERPL-MCNC: 1.1 MG/DL
BUN SERPL-MCNC: 7.1 MG/DL (ref 8.4–25.7)
CALCIUM SERPL-MCNC: 10 MG/DL (ref 8.4–10.2)
CHLORIDE SERPL-SCNC: 98 MMOL/L (ref 98–107)
CO2 SERPL-SCNC: 26 MMOL/L (ref 22–29)
COLOR UR AUTO: ABNORMAL
CREAT SERPL-MCNC: 0.94 MG/DL (ref 0.73–1.18)
EOSINOPHIL # BLD AUTO: 0.02 X10(3)/MCL (ref 0–0.9)
EOSINOPHIL NFR BLD AUTO: 0.4 %
ERYTHROCYTE [DISTWIDTH] IN BLOOD BY AUTOMATED COUNT: 15 % (ref 11.5–17)
GFR SERPLBLD CREATININE-BSD FMLA CKD-EPI: >60 MLS/MIN/1.73/M2
GLOBULIN SER-MCNC: 4.4 GM/DL (ref 2.4–3.5)
GLUCOSE SERPL-MCNC: 106 MG/DL (ref 74–100)
GLUCOSE UR QL STRIP.AUTO: NORMAL MG/DL
HCT VFR BLD AUTO: 39.9 % (ref 42–52)
HGB BLD-MCNC: 13.4 GM/DL (ref 14–18)
HYALINE CASTS #/AREA URNS LPF: ABNORMAL /LPF
IMM GRANULOCYTES # BLD AUTO: 0.01 X10(3)/MCL (ref 0–0.04)
IMM GRANULOCYTES NFR BLD AUTO: 0.2 %
KETONES UR QL STRIP.AUTO: ABNORMAL MG/DL
LEUKOCYTE ESTERASE UR QL STRIP.AUTO: NEGATIVE UNIT/L
LYMPHOCYTES # BLD AUTO: 0.93 X10(3)/MCL (ref 0.6–4.6)
LYMPHOCYTES NFR BLD AUTO: 20.2 %
MCH RBC QN AUTO: 30.1 PG (ref 27–31)
MCHC RBC AUTO-ENTMCNC: 33.6 MG/DL (ref 33–36)
MCV RBC AUTO: 89.7 FL (ref 80–94)
MONOCYTES # BLD AUTO: 0.31 X10(3)/MCL (ref 0.1–1.3)
MONOCYTES NFR BLD AUTO: 6.7 %
MUCOUS THREADS URNS QL MICRO: ABNORMAL /LPF
NEUTROPHILS # BLD AUTO: 3.3 X10(3)/MCL (ref 2.1–9.2)
NEUTROPHILS NFR BLD AUTO: 72.3 %
NITRITE UR QL STRIP.AUTO: NEGATIVE
NRBC BLD AUTO-RTO: 0 %
PH UR STRIP.AUTO: 5.5 [PH]
PLATELET # BLD AUTO: 113 X10(3)/MCL (ref 130–400)
PMV BLD AUTO: 10.4 FL (ref 7.4–10.4)
POTASSIUM SERPL-SCNC: 4.2 MMOL/L (ref 3.5–5.1)
PROT SERPL-MCNC: 8.5 GM/DL (ref 6.4–8.3)
PROT UR QL STRIP.AUTO: ABNORMAL MG/DL
RBC # BLD AUTO: 4.45 X10(6)/MCL (ref 4.7–6.1)
RBC #/AREA URNS AUTO: ABNORMAL /HPF
RBC UR QL AUTO: NEGATIVE UNIT/L
SODIUM SERPL-SCNC: 139 MMOL/L (ref 136–145)
SP GR UR STRIP.AUTO: 1.02
SQUAMOUS #/AREA URNS LPF: ABNORMAL /HPF
UROBILINOGEN UR STRIP-ACNC: ABNORMAL MG/DL
WBC # SPEC AUTO: 4.6 X10(3)/MCL (ref 4.5–11.5)
WBC #/AREA URNS AUTO: ABNORMAL /HPF

## 2022-09-07 PROCEDURE — 81001 URINALYSIS AUTO W/SCOPE: CPT | Performed by: FAMILY MEDICINE

## 2022-09-07 PROCEDURE — 25000003 PHARM REV CODE 250: Performed by: FAMILY MEDICINE

## 2022-09-07 PROCEDURE — 80053 COMPREHEN METABOLIC PANEL: CPT | Performed by: FAMILY MEDICINE

## 2022-09-07 PROCEDURE — 36415 COLL VENOUS BLD VENIPUNCTURE: CPT | Performed by: FAMILY MEDICINE

## 2022-09-07 PROCEDURE — 85025 COMPLETE CBC W/AUTO DIFF WBC: CPT | Performed by: FAMILY MEDICINE

## 2022-09-07 PROCEDURE — 99284 EMERGENCY DEPT VISIT MOD MDM: CPT

## 2022-09-07 RX ORDER — ONDANSETRON 4 MG/1
4 TABLET, ORALLY DISINTEGRATING ORAL EVERY 6 HOURS PRN
Qty: 10 TABLET | Refills: 0 | Status: SHIPPED | OUTPATIENT
Start: 2022-09-07 | End: 2022-09-12

## 2022-09-07 RX ORDER — ACETAMINOPHEN 325 MG/1
650 TABLET ORAL
Status: COMPLETED | OUTPATIENT
Start: 2022-09-07 | End: 2022-09-07

## 2022-09-07 RX ORDER — ASPIRIN 325 MG/1
100 TABLET, FILM COATED ORAL DAILY
Qty: 90 TABLET | Refills: 0 | Status: SHIPPED | OUTPATIENT
Start: 2022-09-07 | End: 2022-09-22 | Stop reason: SDUPTHER

## 2022-09-07 RX ORDER — FOLIC ACID 1 MG/1
1000 TABLET ORAL DAILY
Qty: 90 TABLET | Refills: 0 | Status: SHIPPED | OUTPATIENT
Start: 2022-09-07 | End: 2022-09-22 | Stop reason: SDUPTHER

## 2022-09-07 RX ADMIN — ACETAMINOPHEN 650 MG: 325 TABLET ORAL at 08:09

## 2022-09-07 NOTE — ED PROVIDER NOTES
"Encounter Date: 9/7/2022       History     Chief Complaint   Patient presents with    Headache     Frontal headache since this morning. "I be dehydrated, I don't drink enough. I just know."      57 y.o. male presents to the ER with complaints of headache and "feeling dehydrated."  No abdominal pain, chest pain, dyspnea, nausea or vomiting.  Patient reports he does not "drink enough water." Patient also requests refills of thiamine and folic acid.    The history is provided by the patient.   Review of patient's allergies indicates:  No Known Allergies  Past Medical History:   Diagnosis Date    Hypertension      History reviewed. No pertinent surgical history.  History reviewed. No pertinent family history.  Social History     Tobacco Use    Smoking status: Never    Smokeless tobacco: Never   Substance Use Topics    Alcohol use: Yes     Alcohol/week: 24.0 standard drinks     Types: 24 Cans of beer per week     Comment: weekends; take medication during the week    Drug use: Yes     Types: Marijuana     Comment: states "every now and then"     Review of Systems   Constitutional:  Negative for chills, fatigue and fever.   HENT:  Negative for ear pain, rhinorrhea and sore throat.    Eyes:  Negative for photophobia and pain.   Respiratory:  Negative for cough, shortness of breath and wheezing.    Cardiovascular:  Negative for chest pain.   Gastrointestinal:  Negative for abdominal pain, diarrhea, nausea and vomiting.   Genitourinary:  Negative for dysuria.   Neurological:  Negative for dizziness, weakness and headaches.   All other systems reviewed and are negative.    Physical Exam     Initial Vitals [09/07/22 0746]   BP Pulse Resp Temp SpO2   (!) 168/93 105 16 97.2 °F (36.2 °C) 100 %      MAP       --         Physical Exam    Nursing note and vitals reviewed.  Constitutional: He appears well-developed and well-nourished.   HENT:   Head: Normocephalic and atraumatic.   Eyes: EOM are normal. Pupils are equal, round, and " reactive to light.   Neck: Neck supple.   Normal range of motion.  Cardiovascular:  Regular rhythm, normal heart sounds and intact distal pulses.     Exam reveals no gallop and no friction rub.       No murmur heard.  tachycardia   Pulmonary/Chest: Breath sounds normal. No respiratory distress.   Abdominal: Abdomen is soft. Bowel sounds are normal. He exhibits no distension. There is no abdominal tenderness.   Musculoskeletal:         General: Normal range of motion.      Cervical back: Normal range of motion and neck supple.     Neurological: He is alert and oriented to person, place, and time. He has normal strength.   Skin: Skin is warm and dry.   Psychiatric: He has a normal mood and affect. His behavior is normal. Judgment and thought content normal.       ED Course   Procedures  Labs Reviewed   COMPREHENSIVE METABOLIC PANEL - Abnormal; Notable for the following components:       Result Value    Glucose Level 106 (*)     Blood Urea Nitrogen 7.1 (*)     Protein Total 8.5 (*)     Globulin 4.4 (*)     Albumin/Globulin Ratio 0.9 (*)     Aspartate Aminotransferase 59 (*)     All other components within normal limits   URINALYSIS, REFLEX TO URINE CULTURE - Abnormal; Notable for the following components:    Color, UA Orange (*)     Protein, UA Trace (*)     Ketones, UA 1+ (*)     Urobilinogen, UA 1+ (*)     Squamous Epithelial Cells, UA Trace (*)     Mucous, UA Many (*)     All other components within normal limits   CBC WITH DIFFERENTIAL - Abnormal; Notable for the following components:    RBC 4.45 (*)     Hgb 13.4 (*)     Hct 39.9 (*)     Platelet 113 (*)     All other components within normal limits   CBC W/ AUTO DIFFERENTIAL    Narrative:     The following orders were created for panel order CBC Auto Differential.  Procedure                               Abnormality         Status                     ---------                               -----------         ------                     CBC with  Differential[[id number]]        Abnormal            Final result                 Please view results for these tests on the individual orders.          Imaging Results    None          Medications   acetaminophen tablet 650 mg (650 mg Oral Given 9/7/22 0821)                 ED Course as of 09/07/22 0854   Wed Sep 07, 2022   0825 WBC: 4.6 [MW]   0826 Hemoglobin(!): 13.4 [MW]   0826 Hematocrit(!): 39.9 [MW]   0826 Platelets(!): 113 [MW]   0836 Color, UA(!): Orange [MW]   0836 Appearance, UA: Clear [MW]   0836 Ketones, UA(!): 1+ [MW]   0836 Occult Blood UA: Negative [MW]   0836 Bilirubin, UA: Negative [MW]   0836 NITRITE UA: Negative [MW]   0836 Bacteria, UA: None Seen [MW]   0836 Mucous, UA(!): Many [MW]   0842 Albumin: 4.1 [MW]   0842 PROTEIN TOTAL(!): 8.5 [MW]   0842 BUN(!): 7.1 [MW]   0842 Glucose(!): 106 [MW]   0842 Sodium: 139 [MW]   0842 Potassium: 4.2 [MW]   0842 Chloride: 98 [MW]   0842 eGFR: >60  Patient currently in no distress, feeling improved.  Discussed results with the patient.  Stable for discharge to home.  Er precautions for any acute worsening. [MW]   0849 Sodium: 139 [MW]   0849 Potassium: 4.2 [MW]   0849 Chloride: 98 [MW]   0849 BUN(!): 7.1 [MW]   0849 Creatinine: 0.94 [MW]   0849 Albumin: 4.1 [MW]   0849 Globulin, Total(!): 4.4 [MW]      ED Course User Index  [MW] Ricki Bird MD               Clinical Impression:   Final diagnoses:  [G44.209] Tension headache (Primary)      ED Disposition Condition    Discharge Stable          ED Prescriptions       Medication Sig Dispense Start Date End Date Auth. Provider    thiamine mononitrate, vit B1, (VITAMIN B-1, MONONITRATE,) 100 mg Tab Take 1 tablet (100 mg total) by mouth once daily. 90 tablet 9/7/2022 12/6/2022 Ricki Bird MD    folic acid (FOLVITE) 1 MG tablet Take 1 tablet (1,000 mcg total) by mouth once daily. 90 tablet 9/7/2022 12/6/2022 Ricki Bird MD    ondansetron (ZOFRAN-ODT) 4 MG TbDL Take 1 tablet (4 mg total) by  mouth every 6 (six) hours as needed (nausea vomiting). 10 tablet 9/7/2022 9/12/2022 Ricki Bird MD          Follow-up Information       Follow up With Specialties Details Why Contact Info    Ry Barton MD Cardiology   [address] [facility] [address]  [phone]      [facility] Emergency Medicine  As needed, If symptoms worsen [address] [facility] [address] [phone]    Primary Care Physician  In 5 days               Ricki Bird MD  09/07/22 0854

## 2022-09-22 ENCOUNTER — OFFICE VISIT (OUTPATIENT)
Dept: INTERNAL MEDICINE | Facility: CLINIC | Age: 57
End: 2022-09-22
Payer: MEDICAID

## 2022-09-22 VITALS
HEIGHT: 69 IN | HEART RATE: 75 BPM | BODY MASS INDEX: 26.77 KG/M2 | DIASTOLIC BLOOD PRESSURE: 86 MMHG | OXYGEN SATURATION: 100 % | RESPIRATION RATE: 18 BRPM | WEIGHT: 180.75 LBS | SYSTOLIC BLOOD PRESSURE: 146 MMHG | TEMPERATURE: 98 F

## 2022-09-22 DIAGNOSIS — E55.9 VITAMIN D DEFICIENCY: ICD-10-CM

## 2022-09-22 DIAGNOSIS — E78.5 HYPERLIPIDEMIA, UNSPECIFIED HYPERLIPIDEMIA TYPE: ICD-10-CM

## 2022-09-22 DIAGNOSIS — M54.50 CHRONIC BILATERAL LOW BACK PAIN WITHOUT SCIATICA: ICD-10-CM

## 2022-09-22 DIAGNOSIS — Z12.11 COLON CANCER SCREENING: ICD-10-CM

## 2022-09-22 DIAGNOSIS — R73.03 PRE-DIABETES: ICD-10-CM

## 2022-09-22 DIAGNOSIS — I10 HYPERTENSION, UNSPECIFIED TYPE: Primary | ICD-10-CM

## 2022-09-22 DIAGNOSIS — Z13.29 THYROID DISORDER SCREENING: ICD-10-CM

## 2022-09-22 DIAGNOSIS — Z23 NEED FOR SHINGLES VACCINE: ICD-10-CM

## 2022-09-22 DIAGNOSIS — G89.29 CHRONIC BILATERAL LOW BACK PAIN WITHOUT SCIATICA: ICD-10-CM

## 2022-09-22 DIAGNOSIS — F10.20 ALCOHOLISM: ICD-10-CM

## 2022-09-22 PROCEDURE — 90750 HZV VACC RECOMBINANT IM: CPT | Mod: PBBFAC

## 2022-09-22 PROCEDURE — 99214 OFFICE O/P EST MOD 30 MIN: CPT | Mod: PBBFAC

## 2022-09-22 RX ORDER — ASPIRIN 325 MG/1
100 TABLET, FILM COATED ORAL DAILY
Qty: 90 TABLET | Refills: 0 | Status: SHIPPED | OUTPATIENT
Start: 2022-09-22 | End: 2022-12-21

## 2022-09-22 RX ORDER — METHOCARBAMOL 750 MG/1
750 TABLET, FILM COATED ORAL 2 TIMES DAILY PRN
Qty: 30 TABLET | Refills: 2 | Status: SHIPPED | OUTPATIENT
Start: 2022-09-22 | End: 2023-02-08 | Stop reason: SDUPTHER

## 2022-09-22 RX ORDER — PROPRANOLOL HYDROCHLORIDE 20 MG/1
20 TABLET ORAL 2 TIMES DAILY
Qty: 60 TABLET | Refills: 2 | Status: SHIPPED | OUTPATIENT
Start: 2022-09-22 | End: 2022-12-12

## 2022-09-22 RX ORDER — FOLIC ACID 1 MG/1
1000 TABLET ORAL DAILY
Qty: 90 TABLET | Refills: 0 | Status: SHIPPED | OUTPATIENT
Start: 2022-09-22 | End: 2022-12-12 | Stop reason: SDUPTHER

## 2022-09-22 RX ORDER — DISULFIRAM 250 MG/1
250 TABLET ORAL DAILY
Qty: 90 TABLET | Refills: 2 | Status: SHIPPED | OUTPATIENT
Start: 2022-09-22 | End: 2022-12-12

## 2022-09-22 NOTE — PROGRESS NOTES
"  INTERNAL MEDICINE RESIDENT CLINIC  CLINIC NOTE    Patient Name: Ger Denton  YOB: 1965  Chief Complaint: No chief complaint on file.     PRESENTING HISTORY   History of Present Illness:  Mr. Ger Denton is a 57 y.o. male w/ PMHx of alcoholism, hypertension, vitamin D deficiency who presents to internal medicine clinic for follow up.  He continues to drink a 12 pack daily.     Patient also being seen by Dr. José in Cirrhosis clinic (GI), where he apparently was drinking 6 beers a day (7/25/22). Will include recommendations from GI in plan.    Review of Systems:  12 point review of symptoms negative unless otherwise stated above    PAST HISTORY:     Past Medical History:   Diagnosis Date    Hypertension         History reviewed. No pertinent surgical history.    History reviewed. No pertinent family history.    Social History     Socioeconomic History    Marital status: Single   Tobacco Use    Smoking status: Never    Smokeless tobacco: Never   Substance and Sexual Activity    Alcohol use: Yes     Alcohol/week: 24.0 standard drinks     Types: 24 Cans of beer per week     Comment: weekends; take medication during the week    Drug use: Yes     Types: Marijuana     Comment: states "every now and then"    Sexual activity: Not Currently     Social Determinants of Health     Financial Resource Strain: High Risk    Difficulty of Paying Living Expenses: Hard   Food Insecurity: Food Insecurity Present    Worried About Running Out of Food in the Last Year: Sometimes true    Ran Out of Food in the Last Year: Never true   Transportation Needs: No Transportation Needs    Lack of Transportation (Medical): No    Lack of Transportation (Non-Medical): No   Physical Activity: Inactive    Days of Exercise per Week: 0 days    Minutes of Exercise per Session: 0 min   Stress: No Stress Concern Present    Feeling of Stress : Only a little   Social Connections: Socially Isolated    Frequency of Communication with " "Friends and Family: Twice a week    Frequency of Social Gatherings with Friends and Family: Never    Attends [employer] Services: Never    Active Member of Clubs or Organizations: No    Attends Club or Organization Meetings: Never    Marital Status: Never    Housing Stability: Low Risk     Unable to Pay for Housing in the Last Year: No    Number of Places Lived in the Last Year: 1    Unstable Housing in the Last Year: No       MEDICATIONS:     Current Outpatient Medications   Medication Instructions    clotrimazole (LOTRIMIN) 1 % cream Apply to affected area 2 times daily    disulfiram (ANTABUSE) 250 mg, Oral, Daily    ergocalciferol (vitamin D2) 50 mcg, Oral    fluconazole (DIFLUCAN) 200 mg, Oral, Weekly    folic acid (FOLVITE) 1,000 mcg, Oral, Daily    methocarbamoL (ROBAXIN) 750 mg, Oral, 2 times daily PRN    propranoloL (INDERAL) 20 mg, Oral, 2 times daily    thiamine mononitrate (vit B1) (VITAMIN B-1 (MONONITRATE)) 100 mg, Oral, Daily        OBJECTIVE:   Vital Signs:  Vitals:    09/22/22 1248   BP: (!) 146/86   Pulse: 75   Resp: 18   Temp: 98.1 °F (36.7 °C)   TempSrc: Oral   SpO2: 100%   Weight: 82 kg (180 lb 12.4 oz)   Height: 5' 9" (1.753 m)          Physical Exam:  General: well-developed well-nourished in no acute distress  Eye: PERRLA, EOMI, clear conjunctiva, eyelids normal  HENT: Head-normocephalic and atraumatic  Neck: full range of motion, no thyromegaly or lymphadenopathy, trachea midline, supple, no palpable thyroid nodules  Respiratory: clear to auscultation bilaterally without wheezes, rales, rhonchi  Cardiovascular: regular rate and rhythm without murmurs.  No gallops or rubs no JVD.  Capillary refill within normal limits.  Gastrointestinal: soft, non-tender, non-distended with normal bowel sounds, without masses to palpation  Genitourinary: no CVA tenderness to palpation  Musculoskeletal: full range of motion of all extremities/spine without limitation or discomfort  Integumentary: no " rashes or skin lesions present  Neurologic: no signs of peripheral neurological deficit, motor/sensory function intact  Psychiatric:  alert and oriented, cognitive function intact, cooperative with exam, good eye contact, judgement and insight intact, mood and affect full range.    Laboratory  Lab Results   Component Value Date     09/07/2022    K 4.2 09/07/2022    CO2 26 09/07/2022    BUN 7.1 (L) 09/07/2022    CREATININE 0.94 09/07/2022    CALCIUM 10.0 09/07/2022    BILIDIR 0.3 07/29/2021    IBILI 0.30 07/29/2021    ALKPHOS 72 09/07/2022    AST 59 (H) 09/07/2022    ALT 31 09/07/2022    MG 1.30 (L) 06/10/2022    PHOS 3.5 02/22/2021        Lab Results   Component Value Date    WBC 4.6 09/07/2022    RBC 4.45 (L) 09/07/2022    HGB 13.4 (L) 09/07/2022    HCT 39.9 (L) 09/07/2022    MCV 89.7 09/07/2022    MCH 30.1 09/07/2022    MCHC 33.6 09/07/2022    RDW 15.0 09/07/2022     (L) 09/07/2022    MPV 10.4 09/07/2022        Diagnostic Results:  X-Ray Chest PA And Lateral    Result Date: 6/10/2022  EXAMINATION:  XR CHEST PA AND LATERAL    CLINICAL HISTORY:  Other chest pain;    TECHNIQUE:  PA and lateral views of the chest.    COMPARISON:  9 August 2019    FINDINGS:  Lines/tubes/devices: none present    The cardiomediastinal silhouette and central pulmonary vasculature are unremarkable contour and size.  The trachea is midline. There is no lobar consolidation, pleural effusion, or pneumothorax.    There is no acute osseous or extrathoracic abnormality.          X-Ray Lumbar Spine Ap And Lateral    Result Date: 6/10/2022  EXAMINATION:  XR LUMBAR SPINE AP AND LATERAL    CLINICAL HISTORY:  lower back pain;    COMPARISON:  None.    FINDINGS:  There are 5 non-rib-bearing lumbar type vertebral bodies.  There is minimal anterolisthesis of L4 over L5.  The vertebral body heights are maintained.  There is mild disc height loss at L4-5 with small multilevel marginal osteophytes.  There is moderate facet arthropathy in the  lower lumbar spine.  The soft tissues are unremarkable.         No results found in the last 24 hours.     ASSESSMENT & PLAN:     Alcoholism  - Patient continues drink alcohol, currently a 12 pack daily  - Discussed with patient the potential long term consequences if he does refrain from drinking, patient seems understanding of situation, expresses future effort to cut down  - Refilled folic acid 1 mg once daily, B1 100 mg once daily, antabuse 250 mg PRN, propanolol 20 mg BID    HTN  - /86  - Patient taking propanolol 20 mg     Pre-Diabetic  - Last A1c 6.0 from 9/23/19  - Ordered A1c today    Vitamin D Deficiency  - Ordered Vitamin D to reassess level    Health Maintenance/ Wellness  Pneumococcal vaccine: not indicated at this time  TDAP: 8/26/2012, due at next visit  Influenza vaccine: due next flu season  Shingrix vaccine: Given #1 last visit 6/24/22, 2nd given today 9/22/22  Varices Screening (EGD): Scheduled for 11/28/22 by Dr. Green  Lung Cancer Screening: not indicated, nonsmoker  Hepatitis Panel: 11/27/20 nonreactive  COVID-19:      Counseling:  - Patient instructed to limit alcohol use  - Patient counselled on smoking cessation  - Educated on diet (portion control) and exercise (at least 30 minutes per day)  - Relevant educational materials provided    Labs ordered: TSH, Vitamin D, A1c, Lipid Panel  Imaging: None  Medications: reconciled, discussed and refills given.  RTC in 3 months    Natan Singh MD  09/22/2022, 8:47 AM

## 2022-09-26 ENCOUNTER — LAB VISIT (OUTPATIENT)
Dept: LAB | Facility: HOSPITAL | Age: 57
End: 2022-09-26
Payer: MEDICAID

## 2022-09-26 DIAGNOSIS — E55.9 VITAMIN D DEFICIENCY: ICD-10-CM

## 2022-09-26 DIAGNOSIS — Z13.29 THYROID DISORDER SCREENING: ICD-10-CM

## 2022-09-26 DIAGNOSIS — R73.03 PRE-DIABETES: ICD-10-CM

## 2022-09-26 DIAGNOSIS — E78.5 HYPERLIPIDEMIA, UNSPECIFIED HYPERLIPIDEMIA TYPE: ICD-10-CM

## 2022-09-26 LAB
CHOLEST SERPL-MCNC: 163 MG/DL
CHOLEST/HDLC SERPL: 3 {RATIO} (ref 0–5)
DEPRECATED CALCIDIOL+CALCIFEROL SERPL-MC: 24.6 NG/ML (ref 30–80)
EST. AVERAGE GLUCOSE BLD GHB EST-MCNC: 85.3 MG/DL
HBA1C MFR BLD: 4.6 %
HDLC SERPL-MCNC: 57 MG/DL (ref 35–60)
LDLC SERPL CALC-MCNC: 97 MG/DL (ref 50–140)
TRIGL SERPL-MCNC: 43 MG/DL (ref 34–140)
TSH SERPL-ACNC: 1.49 UIU/ML (ref 0.35–4.94)
VLDLC SERPL CALC-MCNC: 9 MG/DL

## 2022-09-26 PROCEDURE — 83036 HEMOGLOBIN GLYCOSYLATED A1C: CPT

## 2022-09-26 PROCEDURE — 36415 COLL VENOUS BLD VENIPUNCTURE: CPT

## 2022-09-26 PROCEDURE — 82306 VITAMIN D 25 HYDROXY: CPT

## 2022-09-26 PROCEDURE — 80061 LIPID PANEL: CPT

## 2022-09-26 PROCEDURE — 84443 ASSAY THYROID STIM HORMONE: CPT

## 2022-09-28 NOTE — PROGRESS NOTES
Attending Addendum:   Patient seen and examined in clinic. Management and Plan were discussed with resident. Care was reasonable and necessary.   Ginger Brasher MD  [facility]

## 2022-10-31 ENCOUNTER — OFFICE VISIT (OUTPATIENT)
Dept: GASTROENTEROLOGY | Facility: CLINIC | Age: 57
End: 2022-10-31
Payer: MEDICAID

## 2022-10-31 VITALS
BODY MASS INDEX: 26.49 KG/M2 | OXYGEN SATURATION: 98 % | DIASTOLIC BLOOD PRESSURE: 86 MMHG | WEIGHT: 179.38 LBS | RESPIRATION RATE: 20 BRPM | SYSTOLIC BLOOD PRESSURE: 150 MMHG | TEMPERATURE: 98 F | HEART RATE: 76 BPM

## 2022-10-31 DIAGNOSIS — K70.30 ALCOHOLIC CIRRHOSIS OF LIVER WITHOUT ASCITES: Primary | ICD-10-CM

## 2022-10-31 PROCEDURE — 99214 OFFICE O/P EST MOD 30 MIN: CPT | Mod: PBBFAC | Performed by: STUDENT IN AN ORGANIZED HEALTH CARE EDUCATION/TRAINING PROGRAM

## 2022-10-31 NOTE — PROGRESS NOTES
Subjective:       Patient ID: Ger Denton is a 57 y.o. male.    Chief Complaint: Follow-up for liver cirrhosis (Voices no complaints today.)    56-year-old male, history of profound alcoholism, presenting to clinic today for initial appointment with me.  Was last seen by his PCP in April 2022, right personally staffed the resident, he at that point had stated he was drinking a 40 oz of beer a day, that it was affecting his lifestyle relationships with others.  Currently unemployed lives with his sister.  He is currently taking disulfiram for and states he has cut back his drinking to a 6 pack on Saturdays and Sundays where he does not take his aforementioned medication.  He recently had an ER visit June 2022 for volume depletion SEUN, was given IV fluids and discharged, however his lab work at that time it showed his platelets had decreased from a baseline of around 123, now at 76.  I had a kelsey discussion with him today in the room but he is already showing signs of decompensation with decreased platelet count now 76, and that today we need to get updated MELD labs to further ascertain his extent of decompensation.  He denies any melena, hematochezia, hematemesis, or jaundice.  Denies any abdominal lower extremity swelling.  He is overdue for updated EGD, on abdominal ultrasound his last abdominal ultrasound performed November 2021 showed hepatic steatosis.  His last MELD of June 2021 was 11, child Jean Baptiste score class A. He is currently taking diclofenac for reasons unknown, discussed with him will be stopping this medication today.      Today's visit:  No complaints today, states states he still drinks 1 beer on occasion maybe 1 beer per week.  Last saw PCP in September, already with much improvement in his laboratory findings including his platelet count was now back to 209.  He is still taking disulfiram.  Did not get his updated ultrasound of the abdomen for HCC screening will reorder.  Current MELD is 9, Child  Jean Baptiste class a.  Denies any melena, hematochezia, jaundice, hematemesis.  Review of Systems   Constitutional: Negative.    HENT: Negative.     Eyes: Negative.    Respiratory: Negative.     Cardiovascular: Negative.    Gastrointestinal: Negative.    Endocrine: Negative.    Genitourinary: Negative.    Musculoskeletal: Negative.    Integumentary:  Negative.   Allergic/Immunologic: Negative.    Neurological: Negative.    Hematological: Negative.    Psychiatric/Behavioral: Negative.         Objective:   Vitals:    10/31/22 1422   BP: (!) 150/86   Pulse: 76   Resp: 20   Temp: 97.7 °F (36.5 °C)           Physical Exam  Constitutional:       Appearance: Normal appearance. He is normal weight.   HENT:      Head: Normocephalic and atraumatic.      Mouth/Throat:      Mouth: Mucous membranes are moist.      Pharynx: Oropharynx is clear.   Eyes:      Extraocular Movements: Extraocular movements intact.      Conjunctiva/sclera: Conjunctivae normal.      Pupils: Pupils are equal, round, and reactive to light.   Cardiovascular:      Rate and Rhythm: Normal rate and regular rhythm.      Pulses: Normal pulses.      Heart sounds: Normal heart sounds.   Pulmonary:      Effort: Pulmonary effort is normal.      Breath sounds: Normal breath sounds.   Abdominal:      General: Abdomen is flat. Bowel sounds are normal.      Palpations: Abdomen is soft.   Musculoskeletal:         General: Normal range of motion.   Skin:     General: Skin is warm and dry.   Neurological:      General: No focal deficit present.      Mental Status: He is alert and oriented to person, place, and time. Mental status is at baseline.   Psychiatric:         Mood and Affect: Mood normal.         Thought Content: Thought content normal.         Judgment: Judgment normal.       Assessment:       Problem List Items Addressed This Visit    None  Visit Diagnoses       Alcoholic cirrhosis of liver without ascites    -  Primary    Relevant Orders    US Abdomen Limited_Liver               Plan:       Background:  Alcohol:  Yes, drinking 1 beer occasionally on the weekend     Tobacco: yes     Liver disease: ETOH     MELD-Na: 9  Child-Jean Baptiste Class: A     Transplant: not under evaluation, low MELD     Cirrhosis is decompensated with:     Ascites: no  Spontaneous bacterial peritonitis: No  Hepatic Encephalopathy: No  Hepatocellular carcinoma: No  Hepatorenal syndrome: No  Hyponatremia: yes  Muscle wasting: No  Portal vein thrombosis: No     Screening:  Last EGD:  Needs baseline screening EGD        Last imaging study:  [facility] November 2020, hepatic steatosis present     CIRRHOSIS COUNSELING:  - strict abstinence of alcohol use  - low sodium (salt) 2000mg per day  - Nutrition: 25-30kcal (calorie per body weight in kilogram) per day  - No need to restrict protein in diet  - High protein diet: 1.2-1.5 gram/kg (protein per body weight in kg) per day to prevent muscle mass loss  - Resistance exercises for muscle strength  - Avoid raw seafoods due to risk of fatal Vibrio vulnificus infection  - Avoid Non-steroidal anti-inflammatory drugs (NSAIDs) such as ibuprofen, Motrin, naproxen, Aleve due to risk of kidney damage  - Can take acetaminophen (tylenol), no more than 2000mg per day  - Compliance with all medications  - Ultrasound or MRI of the liver every 6 months for liver cancer screening  - Upper endoscopy every 1-2 years to screen for varices      Six-month follow-up, reorder ultrasound the abdomen for HCC screening.    EGD slated for 11/28/2022.    Brace patient on his improvement and decreasing intake of alcohol as already being reflected improvement is labs, especially as platelet count now increasing back to 209, as well as improvement in his MELD.  Encouraged to maintain complete abstinence.

## 2022-11-22 ENCOUNTER — ANESTHESIA EVENT (OUTPATIENT)
Dept: ENDOSCOPY | Facility: HOSPITAL | Age: 57
End: 2022-11-22
Payer: MEDICAID

## 2022-11-22 RX ORDER — LIDOCAINE HYDROCHLORIDE 10 MG/ML
1 INJECTION, SOLUTION EPIDURAL; INFILTRATION; INTRACAUDAL; PERINEURAL ONCE
Status: CANCELLED | OUTPATIENT
Start: 2022-11-22 | End: 2022-11-22

## 2022-11-22 NOTE — ANESTHESIA PREPROCEDURE EVALUATION
"                                                                                                             11/22/2022  Ger Denton is a 57 y.o., male with PMHx of HTN, HLD, ETOH abuse/cirrhosis presents for screening EGD.    Propanolol--last dose 11/28/22 @ 0912    Active Ambulatory Problems     Diagnosis Date Noted    Alcohol abuse 06/24/2022    Hypertension 06/24/2022     Resolved Ambulatory Problems     Diagnosis Date Noted    No Resolved Ambulatory Problems     Past Medical History:   Diagnosis Date    Cirrhosis        Pre-op Assessment    I have reviewed the NPO Status.      Review of Systems  Anesthesia Hx:  No previous Anesthesia    Social:  Non-Smoker    Cardiovascular:   Hypertension, poorly controlled hyperlipidemia    Pulmonary:  Pulmonary Normal    Renal/:  Renal/ Normal     Hepatic/GI:   Liver Disease,    Neurological:  Neurology Normal    Endocrine:  Endocrine Normal      Vitals:    11/28/22 0858   BP: (!) 172/106   BP Location: Left arm   Patient Position: Lying   Pulse: (!) 115   Resp: 20   Temp: 36.9 °C (98.4 °F)   TempSrc: Oral   SpO2: 97%   Weight: 80 kg (176 lb 5.9 oz)   Height: 5' 9" (1.753 m)         Physical Exam  General: Alert, Cooperative and Well nourished    Airway:  Mallampati: II   Mouth Opening: Normal  TM Distance: Normal  Tongue: Normal  Neck ROM: Normal ROM    Dental:  Intact    Chest/Lungs:  Clear to auscultation, Normal Respiratory Rate    Heart:  Rate: Normal  Rhythm: Regular Rhythm  Sounds: Normal      Lab Results   Component Value Date    WBC 4.6 09/07/2022    HGB 13.4 (L) 09/07/2022    HCT 39.9 (L) 09/07/2022    MCV 89.7 09/07/2022     (L) 09/07/2022       CMP  Sodium Level   Date Value Ref Range Status   09/07/2022 139 136 - 145 mmol/L Final     Potassium Level   Date Value Ref Range Status   09/07/2022 4.2 3.5 - 5.1 mmol/L Final     Carbon Dioxide   Date Value Ref Range Status   09/07/2022 26 22 - 29 mmol/L Final     Blood Urea Nitrogen   Date Value Ref " Range Status   09/07/2022 7.1 (L) 8.4 - 25.7 mg/dL Final     Creatinine   Date Value Ref Range Status   09/07/2022 0.94 0.73 - 1.18 mg/dL Final     Calcium Level Total   Date Value Ref Range Status   09/07/2022 10.0 8.4 - 10.2 mg/dL Final     Albumin Level   Date Value Ref Range Status   09/07/2022 4.1 3.5 - 5.0 gm/dL Final     Bilirubin Total   Date Value Ref Range Status   09/07/2022 1.1 <=1.5 mg/dL Final     Alkaline Phosphatase   Date Value Ref Range Status   09/07/2022 72 40 - 150 unit/L Final     Aspartate Aminotransferase   Date Value Ref Range Status   09/07/2022 59 (H) 5 - 34 unit/L Final     Alanine Aminotransferase   Date Value Ref Range Status   09/07/2022 31 0 - 55 unit/L Final     eGFR   Date Value Ref Range Status   09/07/2022 >60 mls/min/1.73/m2 Final     Lab Results   Component Value Date    INR 1.02 07/25/2022    PROTIME 13.2 07/25/2022             Anesthesia Plan  Type of Anesthesia, risks & benefits discussed:    Anesthesia Type: Gen Natural Airway  Intra-op Monitoring Plan: Standard ASA Monitors  Post Op Pain Control Plan: IV/PO Opioids PRN  Induction:  IV  Informed Consent: Informed consent signed with the Patient and all parties understand the risks and agree with anesthesia plan.  All questions answered.   ASA Score: 3  Day of Surgery Review of History & Physical: H&P Update referred to the surgeon/provider.    Ready For Surgery From Anesthesia Perspective.     .

## 2022-11-28 ENCOUNTER — HOSPITAL ENCOUNTER (OUTPATIENT)
Facility: HOSPITAL | Age: 57
Discharge: HOME | End: 2022-11-28
Attending: INTERNAL MEDICINE | Admitting: INTERNAL MEDICINE
Payer: MEDICAID

## 2022-11-28 ENCOUNTER — ANESTHESIA (OUTPATIENT)
Dept: ENDOSCOPY | Facility: HOSPITAL | Age: 57
End: 2022-11-28
Payer: MEDICAID

## 2022-11-28 DIAGNOSIS — K74.60 HEPATIC CIRRHOSIS, UNSPECIFIED HEPATIC CIRRHOSIS TYPE, UNSPECIFIED WHETHER ASCITES PRESENT: ICD-10-CM

## 2022-11-28 DIAGNOSIS — K70.30 ALCOHOLIC CIRRHOSIS OF LIVER WITHOUT ASCITES: Primary | ICD-10-CM

## 2022-11-28 PROCEDURE — 43239 EGD BIOPSY SINGLE/MULTIPLE: CPT | Performed by: INTERNAL MEDICINE

## 2022-11-28 PROCEDURE — 37000009 HC ANESTHESIA EA ADD 15 MINS: Performed by: INTERNAL MEDICINE

## 2022-11-28 PROCEDURE — 63600175 PHARM REV CODE 636 W HCPCS: Performed by: NURSE ANESTHETIST, CERTIFIED REGISTERED

## 2022-11-28 PROCEDURE — 00731 ANES UPR GI NDSC PX NOS: CPT | Performed by: INTERNAL MEDICINE

## 2022-11-28 PROCEDURE — 25000003 PHARM REV CODE 250: Performed by: INTERNAL MEDICINE

## 2022-11-28 PROCEDURE — 27201423 OPTIME MED/SURG SUP & DEVICES STERILE SUPPLY: Performed by: INTERNAL MEDICINE

## 2022-11-28 PROCEDURE — 25000003 PHARM REV CODE 250: Performed by: ANESTHESIOLOGY

## 2022-11-28 PROCEDURE — 63600175 PHARM REV CODE 636 W HCPCS: Performed by: ANESTHESIOLOGY

## 2022-11-28 PROCEDURE — 25000003 PHARM REV CODE 250: Performed by: NURSE ANESTHETIST, CERTIFIED REGISTERED

## 2022-11-28 PROCEDURE — 88313 SPECIAL STAINS GROUP 2: CPT | Performed by: INTERNAL MEDICINE

## 2022-11-28 PROCEDURE — 37000008 HC ANESTHESIA 1ST 15 MINUTES: Performed by: INTERNAL MEDICINE

## 2022-11-28 RX ORDER — LIDOCAINE HYDROCHLORIDE 20 MG/ML
SOLUTION OROPHARYNGEAL
Status: COMPLETED | OUTPATIENT
Start: 2022-11-28 | End: 2022-11-28

## 2022-11-28 RX ORDER — SODIUM CHLORIDE, SODIUM LACTATE, POTASSIUM CHLORIDE, CALCIUM CHLORIDE 600; 310; 30; 20 MG/100ML; MG/100ML; MG/100ML; MG/100ML
INJECTION, SOLUTION INTRAVENOUS CONTINUOUS
Status: DISCONTINUED | OUTPATIENT
Start: 2022-11-28 | End: 2022-11-28 | Stop reason: HOSPADM

## 2022-11-28 RX ORDER — LIDOCAINE HYDROCHLORIDE 20 MG/ML
INJECTION, SOLUTION EPIDURAL; INFILTRATION; INTRACAUDAL; PERINEURAL
Status: DISCONTINUED | OUTPATIENT
Start: 2022-11-28 | End: 2022-11-28

## 2022-11-28 RX ORDER — PROPOFOL 10 MG/ML
VIAL (ML) INTRAVENOUS
Status: DISCONTINUED | OUTPATIENT
Start: 2022-11-28 | End: 2022-11-28

## 2022-11-28 RX ORDER — PROPRANOLOL HYDROCHLORIDE 20 MG/1
20 TABLET ORAL 2 TIMES DAILY
Status: DISCONTINUED | OUTPATIENT
Start: 2022-11-28 | End: 2022-11-28 | Stop reason: HOSPADM

## 2022-11-28 RX ADMIN — PROPOFOL 20 MG: 10 INJECTION, EMULSION INTRAVENOUS at 11:11

## 2022-11-28 RX ADMIN — PROPOFOL 50 MG: 10 INJECTION, EMULSION INTRAVENOUS at 11:11

## 2022-11-28 RX ADMIN — PROPRANOLOL HYDROCHLORIDE 20 MG: 20 TABLET ORAL at 09:11

## 2022-11-28 RX ADMIN — PROPOFOL 130 MG: 10 INJECTION, EMULSION INTRAVENOUS at 11:11

## 2022-11-28 RX ADMIN — SODIUM CHLORIDE, POTASSIUM CHLORIDE, SODIUM LACTATE AND CALCIUM CHLORIDE: 600; 310; 30; 20 INJECTION, SOLUTION INTRAVENOUS at 10:11

## 2022-11-28 RX ADMIN — SODIUM CHLORIDE, POTASSIUM CHLORIDE, SODIUM LACTATE AND CALCIUM CHLORIDE: 600; 310; 30; 20 INJECTION, SOLUTION INTRAVENOUS at 09:11

## 2022-11-28 RX ADMIN — LIDOCAINE HYDROCHLORIDE 50 MG: 20 INJECTION, SOLUTION EPIDURAL; INFILTRATION; INTRACAUDAL; PERINEURAL at 11:11

## 2022-11-28 NOTE — ANESTHESIA POSTPROCEDURE EVALUATION
Anesthesia Post Evaluation    Patient: Ger Denton    Procedure(s) Performed: Procedure(s) (LRB):  EGD, WITH CLOSED BIOPSY (N/A)    Final Anesthesia Type: general      Patient location during evaluation: GI PACU  Patient participation: Yes- Able to Participate  Level of consciousness: awake and alert  Post-procedure vital signs: reviewed and stable  Pain management: adequate  Airway patency: patent    PONV status at discharge: No PONV  Anesthetic complications: no      Cardiovascular status: hemodynamically stable  Respiratory status: unassisted, room air and spontaneous ventilation  Hydration status: euvolemic  Follow-up not needed.          Vitals Value Taken Time   /97 11/28/22 0910   Temp 36.9 °C (98.4 °F) 11/28/22 0858   Pulse 108 11/28/22 0910   Resp 20 11/28/22 0858   SpO2 97 % 11/28/22 0858         No case tracking events are documented in the log.      Pain/Ciarra Score: No data recorded

## 2022-11-28 NOTE — H&P
"EGD History and Physical    Patient Name: Ger Denton  MRN: [id number]  : 1965  Date of Procedure:  2022  Referring Physician: Kaycee Green MD  Primary Physician: Natan Singh MD  Procedure Physician: Kaycee Green MD, MPH     Procedure - EGD  ASA - per anesthesia  Mallampati - per anesthesia  History of Anesthesia problems - no  Family history Anesthesia problems -  no   Plan of anesthesia - General    Diagnosis: screening for varices  Chief Complaint: Same as above    HPI: Patient is an 57 y.o. male with PMH of alcohol use, cirrhosis (MELD 9, Child-Jean Baptiste A), HTN who is here for the above. Patient reports occasional emesis episodes, however they are nonbloody. Patient also having decreased appetite. Patient denies fevers, chills, abdominal pain, melena or hematochezia. Patient continues to drink alcohol, stating he drinks ~ 6pack/day    Last colonoscopy: never   Family history: denies history of colorectal cancer, other cancer history  Anticoagulation: none    ROS:  Constitutional: No fevers, chills, No weight loss  CV: No chest pain  Pulm: No cough, No shortness of breath  GI: see HPI    Medical History:   Past Medical History:   Diagnosis Date    Cirrhosis     Hypertension          Surgical History:   No past surgical history on file.    Family History:   No family history on file..    Social History:   Social History     Socioeconomic History    Marital status: Single   Tobacco Use    Smoking status: Never    Smokeless tobacco: Never   Substance and Sexual Activity    Alcohol use: Yes     Alcohol/week: 24.0 standard drinks     Types: 24 Cans of beer per week     Comment: weekends; take medication during the week    Drug use: Yes     Types: Marijuana     Comment: states "every now and then"    Sexual activity: Not Currently     Social Determinants of Health     Financial Resource Strain: High Risk    Difficulty of Paying Living Expenses: Hard   Food Insecurity: Food Insecurity Present "    Worried About Running Out of Food in the Last Year: Sometimes true    Ran Out of Food in the Last Year: Never true   Transportation Needs: No Transportation Needs    Lack of Transportation (Medical): No    Lack of Transportation (Non-Medical): No   Physical Activity: Inactive    Days of Exercise per Week: 0 days    Minutes of Exercise per Session: 0 min   Stress: No Stress Concern Present    Feeling of Stress : Only a little   Social Connections: Socially Isolated    Frequency of Communication with Friends and Family: Twice a week    Frequency of Social Gatherings with Friends and Family: Never    Attends Congregation Services: Never    Active Member of Clubs or Organizations: No    Attends Club or Organization Meetings: Never    Marital Status: Never    Housing Stability: Low Risk     Unable to Pay for Housing in the Last Year: No    Number of Places Lived in the Last Year: 1    Unstable Housing in the Last Year: No       Review of patient's allergies indicates:  No Known Allergies    Medications:   Medications Prior to Admission   Medication Sig Dispense Refill Last Dose    disulfiram (ANTABUSE) 250 mg tablet Take 1 tablet (250 mg total) by mouth once daily. 90 tablet 2     ergocalciferol, vitamin D2, 50 mcg (2,000 unit) Cap Take 50 mcg by mouth.       folic acid (FOLVITE) 1 MG tablet Take 1 tablet (1,000 mcg total) by mouth once daily. 90 tablet 0     methocarbamoL (ROBAXIN) 750 MG Tab Take 1 tablet (750 mg total) by mouth 2 (two) times daily as needed (lower back pain secondary to muscle spasms). 30 tablet 2     propranoloL (INDERAL) 20 MG tablet Take 1 tablet (20 mg total) by mouth 2 (two) times daily. 60 tablet 2     thiamine mononitrate, vit B1, (VITAMIN B-1, MONONITRATE,) 100 mg Tab Take 1 tablet (100 mg total) by mouth once daily. 90 tablet 0          Physical Exam:    Vital Signs: There were no vitals filed for this visit.  There were no vitals taken for this visit.    General:          Well  appearing in no acute distress  Lungs: Unlabored breathing on RA  Heart: Regular rate  Abdomen:         Soft, non-tender, nondistended        Labs:  Lab Results   Component Value Date    WBC 4.6 09/07/2022    HGB 13.4 (L) 09/07/2022    HCT 39.9 (L) 09/07/2022    MCV 89.7 09/07/2022     (L) 09/07/2022     Lab Results   Component Value Date    INR 1.02 07/25/2022     Lab Results   Component Value Date     09/07/2022    K 4.2 09/07/2022    CO2 26 09/07/2022    BUN 7.1 (L) 09/07/2022    CREATININE 0.94 09/07/2022    LABPROT 8.5 (H) 09/07/2022    ALBUMIN 4.1 09/07/2022    BILITOT 1.1 09/07/2022    ALKPHOS 72 09/07/2022    ALT 31 09/07/2022    AST 59 (H) 09/07/2022         Assessment and Plan:    History reviewed, vital signs satisfactory, cardiopulmonary status satisfactory.  I have explained the sedation options, risks, benefits, and alternatives of this endoscopic procedure to the patient including but not limited to bleeding, inflammation, infection, perforation, and death.  All questions were answered and the patient consented to proceed with procedure as planned.   The patient is deemed an appropriate candidate for the sedation as planned.      Bhavin Weiss MD  [employer] General Surgery, PGY2      11/28/2022  8:30 AM

## 2022-11-28 NOTE — PROVATION PATIENT INSTRUCTIONS
Discharge Summary/Instructions after an Endoscopic Procedure  Patient Name: Ger Denton  Patient MRN: [id number]  Patient YOB: 1965 Monday, November 28, 2022  Kaycee Green MD  Dear patient,  As a result of recent federal legislation (The Federal Cures Act), you may   receive lab or pathology results from your procedure in your [facility]   account before your physician is able to contact you. Your physician or   their representative will relay the results to you with their   recommendations at their soonest availability.  Thank you,  RESTRICTIONS:  During your procedure today, you received medications for sedation.  These   medications may affect your judgment, balance and coordination.  Therefore,   for 24 hours, you have the following restrictions:   - DO NOT drive a car, operate machinery, make legal/financial decisions,   sign important papers or drink alcohol.    ACTIVITY:  Today: no heavy lifting, straining or running due to procedural   sedation/anesthesia.  The following day: return to full activity including work.  DIET:  Eat and drink normally unless instructed otherwise.     TREATMENT FOR COMMON SIDE EFFECTS:  - Mild abdominal pain, nausea, belching, bloating or excessive gas:  rest,   eat lightly and use a heating pad.  - Sore Throat: treat with throat lozenges and/or gargle with warm salt   water.  - Because air was used during the procedure, expelling large amounts of air   from your rectum or belching is normal.  - If a bowel prep was taken, you may not have a bowel movement for 1-3 days.    This is normal.  SYMPTOMS TO WATCH FOR AND REPORT TO YOUR PHYSICIAN:  1. Abdominal pain or bloating, other than gas cramps.  2. Chest pain.  3. Back pain.  4. Signs of infection such as: chills or fever occurring within 24 hours   after the procedure.  5. Rectal bleeding, which would show as bright red, maroon, or black stools.   (A tablespoon of blood from the rectum is not serious, especially  if   hemorrhoids are present.)  6. Vomiting.  7. Weakness or dizziness.  GO DIRECTLY TO THE NEAREST EMERGENCY ROOM IF YOU HAVE ANY OF THE FOLLOWING:      Difficulty breathing              Chills and/or fever over 101 F   Persistent vomiting and/or vomiting blood   Severe abdominal pain   Severe chest pain   Black, tarry stools   Bleeding- more than one tablespoon   Any other symptom or condition that you feel may need urgent attention  Your doctor recommends these additional instructions:  If any biopsies were taken, your doctors clinic will contact you in 1 to 2   weeks with any results.  - Patient has a contact number available for emergencies.  The signs and   symptoms of potential delayed complications were discussed with the   patient.  Return to normal activities tomorrow.  Written discharge   instructions were provided to the patient.   - Discharge patient to home.   - Continue present medications.   - Await pathology results.   - Discontinue alcohol consumption.   - Repeat upper endoscopy in 2 years for surveillance.  For questions, problems or results please call your physician - Kaycee Green MD at Work:  [phone].  [facility] , EMERGENCY ROOM PHONE NUMBER: [phone]  IF A COMPLICATION OR EMERGENCY SITUATION ARISES AND YOU ARE UNABLE TO REACH   YOUR PHYSICIAN - GO DIRECTLY TO THE EMERGENCY ROOM.  MD Kaycee Rogel MD  11/28/2022 11:33:38 AM  This report has been verified and signed electronically.  Dear patient,  As a result of recent federal legislation (The Federal Cures Act), you may   receive lab or pathology results from your procedure in your [facility]   account before your physician is able to contact you. Your physician or   their representative will relay the results to you with their   recommendations at their soonest availability.  Thank you,  PROVATION

## 2022-11-28 NOTE — TRANSFER OF CARE
"Anesthesia Transfer of Care Note    Patient: Ger Denton    Procedure(s) Performed: Procedure(s) (LRB):  EGD, WITH CLOSED BIOPSY (N/A)    Patient location: GI    Anesthesia Type: general    Post pain: adequate analgesia    Post assessment: no apparent anesthetic complications    Post vital signs: stable    Level of consciousness: sedated    Nausea/Vomiting: no nausea/vomiting    Complications: none    Transfer of care protocol was followed      Last vitals:   Visit Vitals  BP (!) 161/97 (Patient Position: Lying)   Pulse 108   Temp 36.9 °C (98.4 °F) (Oral)   Resp 20   Ht 5' 9" (1.753 m)   Wt 80 kg (176 lb 5.9 oz)   SpO2 97%   BMI 26.05 kg/m²     "

## 2022-12-01 VITALS
SYSTOLIC BLOOD PRESSURE: 135 MMHG | RESPIRATION RATE: 18 BRPM | BODY MASS INDEX: 26.12 KG/M2 | HEART RATE: 81 BPM | DIASTOLIC BLOOD PRESSURE: 91 MMHG | WEIGHT: 176.38 LBS | TEMPERATURE: 98 F | OXYGEN SATURATION: 99 % | HEIGHT: 69 IN

## 2022-12-01 LAB
DHEA SERPL-MCNC: NORMAL
ESTROGEN SERPL-MCNC: NORMAL PG/ML
INSULIN SERPL-ACNC: NORMAL U[IU]/ML
LAB AP CLINICAL INFORMATION: NORMAL
LAB AP GROSS DESCRIPTION: NORMAL
LAB AP REPORT FOOTNOTES: NORMAL
T3RU NFR SERPL: NORMAL %

## 2022-12-12 ENCOUNTER — OFFICE VISIT (OUTPATIENT)
Dept: INTERNAL MEDICINE | Facility: CLINIC | Age: 57
End: 2022-12-12
Payer: MEDICAID

## 2022-12-12 VITALS
HEART RATE: 83 BPM | SYSTOLIC BLOOD PRESSURE: 142 MMHG | TEMPERATURE: 99 F | BODY MASS INDEX: 25.48 KG/M2 | DIASTOLIC BLOOD PRESSURE: 92 MMHG | OXYGEN SATURATION: 100 % | HEIGHT: 69 IN | WEIGHT: 172 LBS | RESPIRATION RATE: 18 BRPM

## 2022-12-12 DIAGNOSIS — E55.9 VITAMIN D DEFICIENCY: Primary | ICD-10-CM

## 2022-12-12 DIAGNOSIS — K70.30 ALCOHOLIC CIRRHOSIS OF LIVER WITHOUT ASCITES: ICD-10-CM

## 2022-12-12 DIAGNOSIS — Z12.11 SCREEN FOR COLON CANCER: ICD-10-CM

## 2022-12-12 DIAGNOSIS — Z23 NEED FOR VACCINATION: ICD-10-CM

## 2022-12-12 DIAGNOSIS — F10.20 ALCOHOLISM: ICD-10-CM

## 2022-12-12 DIAGNOSIS — K29.50 CHRONIC GASTRITIS WITHOUT BLEEDING, UNSPECIFIED GASTRITIS TYPE: ICD-10-CM

## 2022-12-12 DIAGNOSIS — A04.8 H. PYLORI INFECTION: ICD-10-CM

## 2022-12-12 DIAGNOSIS — F10.10 ALCOHOL ABUSE: ICD-10-CM

## 2022-12-12 DIAGNOSIS — I10 HYPERTENSION, UNSPECIFIED TYPE: ICD-10-CM

## 2022-12-12 PROBLEM — K29.70 GASTRITIS: Status: ACTIVE | Noted: 2022-12-12

## 2022-12-12 PROCEDURE — 99214 OFFICE O/P EST MOD 30 MIN: CPT | Mod: PBBFAC

## 2022-12-12 PROCEDURE — 90677 PCV20 VACCINE IM: CPT | Mod: PBBFAC

## 2022-12-12 RX ORDER — ERGOCALCIFEROL 1.25 MG/1
50000 CAPSULE ORAL
Qty: 12 CAPSULE | Refills: 0 | Status: SHIPPED | OUTPATIENT
Start: 2022-12-12 | End: 2023-02-08

## 2022-12-12 RX ORDER — BISMUTH SUBCITRATE POTASSIUM, METRONIDAZOLE AND TETRACYCLINE HYDROCHLORIDE 140; 125; 125 MG/1; MG/1; MG/1
3 CAPSULE ORAL
Qty: 120 CAPSULE | Refills: 0 | Status: SHIPPED | OUTPATIENT
Start: 2022-12-12 | End: 2022-12-22

## 2022-12-12 RX ORDER — FOLIC ACID 1 MG/1
1 TABLET ORAL DAILY
Qty: 90 TABLET | Refills: 1 | Status: SHIPPED | OUTPATIENT
Start: 2022-12-12 | End: 2023-02-08 | Stop reason: SDUPTHER

## 2022-12-12 RX ORDER — CARVEDILOL 6.25 MG/1
6.25 TABLET ORAL 2 TIMES DAILY WITH MEALS
Qty: 90 TABLET | Refills: 1 | Status: SHIPPED | OUTPATIENT
Start: 2022-12-12 | End: 2023-02-08 | Stop reason: SDUPTHER

## 2022-12-12 RX ORDER — PANTOPRAZOLE SODIUM 40 MG/1
40 TABLET, DELAYED RELEASE ORAL DAILY
Qty: 90 TABLET | Refills: 1 | Status: SHIPPED | OUTPATIENT
Start: 2022-12-12 | End: 2023-02-08 | Stop reason: SDUPTHER

## 2022-12-12 NOTE — PROGRESS NOTES
"  INTER[facility]ICINE [facility]IDENT [facility]IC  CLINIC NOTE    Patient Name: Ger Denton  YOB: 1965  Chief Complaint: Follow-up     PRESENTING HISTORY   History of Present Illness:    Mr. Ger Denton is a 57 y.o. male w/ PMHx of alcoholism, cirrhosis, hypertension, vitamin D deficiency who presents to internal medicine clinic for follow up.      Remains drinking 6 beers per day   EGD performed 11/28/2022  Liver ultrasound from 11/1/22 with hepatic steatosis  Patient also being seen in Cirrhosis clinic  Recent EGD with biopsy with H pylori and chronic gastritis  Ordered Cologuard   Providing pneumonia 20 vaccine   Starting Pylera and Protonix    Review of Systems:  12 point review of symptoms negative unless otherwise stated above    PAST HISTORY:     Past Medical History:   Diagnosis Date    Cirrhosis     Hypertension         Past Surgical History:   Procedure Laterality Date    none         History reviewed. No pertinent family history.    Social History     Socioeconomic History    Marital status: Single   Tobacco Use    Smoking status: Never    Smokeless tobacco: Never   Substance and Sexual Activity    Alcohol use: Yes     Alcohol/week: 24.0 standard drinks     Types: 24 Cans of beer per week     Comment: weekends; take medication during the week    Drug use: Yes     Types: Marijuana     Comment: states "every now and then"    Sexual activity: Not Currently     Social Determinants of Health     Financial Resource Strain: High Risk    Difficulty of Paying Living Expenses: Hard   Food Insecurity: Food Insecurity Present    Worried About Running Out of Food in the Last Year: Sometimes true    Ran Out of Food in the Last Year: Never true   Transportation Needs: No Transportation Needs    Lack of Transportation (Medical): No    Lack of Transportation (Non-Medical): No   Physical Activity: Inactive    Days of Exercise per Week: 0 days    Minutes of Exercise per Session: 0 min   Stress: No Stress Concern Present " "   Feeling of Stress : Only a little   Social Connections: Socially Isolated    Frequency of Communication with Friends and Family: Twice a week    Frequency of Social Gatherings with Friends and Family: Never    Attends Bahai Services: Never    Active Member of Clubs or Organizations: No    Attends Club or Organization Meetings: Never    Marital Status: Never    Housing Stability: Low Risk     Unable to Pay for Housing in the Last Year: No    Number of Places Lived in the Last Year: 1    Unstable Housing in the Last Year: No       MEDICATIONS:     Current Outpatient Medications   Medication Instructions    bismuth-metronidazole-tetracycline (PLYERA) 140-125-125 mg per capsule 3 capsules, Oral, Before meals & nightly    carvediloL (COREG) 6.25 mg, Oral, 2 times daily with meals    ergocalciferol (ERGOCALCIFEROL) 50,000 Units, Oral, Every 7 days    folic acid (FOLVITE) 1 mg, Oral, Daily    methocarbamoL (ROBAXIN) 750 mg, Oral, 2 times daily PRN    pantoprazole (PROTONIX) 40 mg, Oral, Daily, 30 min before breakfast    thiamine mononitrate (vit B1) (VITAMIN B-1 (MONONITRATE)) 100 mg, Oral, Daily        OBJECTIVE:   Vital Signs:  Vitals:    12/12/22 1346   BP: (!) 142/92   Pulse: 83   Resp: 18   Temp: 98.8 °F (37.1 °C)   TempSrc: Oral   SpO2: 100%   Weight: 78 kg (172 lb)   Height: 5' 9" (1.753 m)       Physical Exam:  General: well-developed well-nourished in no acute distress  Eye: PERRLA, EOMI, clear conjunctiva, eyelids normal  HENT: Head-normocephalic and atraumatic  Neck: full range of motion  Respiratory: clear to auscultation bilaterally without wheezes, rales, rhonchi  Cardiovascular: regular rate and rhythm without murmurs.  No gallops or rubs no JVD.  Capillary refill within normal limits. No edema  Gastrointestinal: soft, non-tender, non-distended with normal bowel sounds, without masses to palpation  Neurologic: no signs of peripheral neurological deficit, motor/sensory function " intact    Laboratory  Lab Results   Component Value Date     09/07/2022    K 4.2 09/07/2022    CO2 26 09/07/2022    BUN 7.1 (L) 09/07/2022    CREATININE 0.94 09/07/2022    CALCIUM 10.0 09/07/2022    BILIDIR 0.3 07/29/2021    IBILI 0.30 07/29/2021    ALKPHOS 72 09/07/2022    AST 59 (H) 09/07/2022    ALT 31 09/07/2022    MG 1.30 (L) 06/10/2022    PHOS 3.5 02/22/2021        Lab Results   Component Value Date    WBC 4.6 09/07/2022    RBC 4.45 (L) 09/07/2022    HGB 13.4 (L) 09/07/2022    HCT 39.9 (L) 09/07/2022    MCV 89.7 09/07/2022    MCH 30.1 09/07/2022    MCHC 33.6 09/07/2022    RDW 15.0 09/07/2022     (L) 09/07/2022    MPV 10.4 09/07/2022        Diagnostic Results:  X-Ray Chest PA And Lateral    Result Date: 6/10/2022  EXAMINATION:  XR CHEST PA AND LATERAL    CLINICAL HISTORY:  Other chest pain;    TECHNIQUE:  PA and lateral views of the chest.    COMPARISON:  9 August 2019    FINDINGS:  Lines/tubes/devices: none present    The cardiomediastinal silhouette and central pulmonary vasculature are unremarkable contour and size.  The trachea is midline. There is no lobar consolidation, pleural effusion, or pneumothorax.    There is no acute osseous or extrathoracic abnormality.          X-Ray Lumbar Spine Ap And Lateral    Result Date: 6/10/2022  EXAMINATION:  XR LUMBAR SPINE AP AND LATERAL    CLINICAL HISTORY:  lower back pain;    COMPARISON:  None.    FINDINGS:  There are 5 non-rib-bearing lumbar type vertebral bodies.  There is minimal anterolisthesis of L4 over L5.  The vertebral body heights are maintained.  There is mild disc height loss at L4-5 with small multilevel marginal osteophytes.  There is moderate facet arthropathy in the lower lumbar spine.  The soft tissues are unremarkable.         No results found in the last 24 hours.     ASSESSMENT & PLAN:     Alcoholism with compensated cirrhosis  Hepatic steatosis  - Patient continues drink alcohol, currently a 6 pack beer daily  - Discussed with  patient the potential long term consequences if he does refrain from drinking, patient seems understanding of situation, expresses future effort to cut down  -cont. folic acid 1 mg once daily, B1 100 mg once daily  -follows cirrhosis clinic   -EGD from 11/2022    Chronic gastritis  H.pylori infection   -started Pylera 3 capsules q.i.d. times 10 days  -started Protonix 40 before breakfast    HTN  - /92  - discontinue propranolol  -start Coreg 6.25 b.i.d.    Pre-Diabetic (Resolved)  - Last A1c 4.6 on 9/7/22    Vitamin D Deficiency  - Vitamin D low  - Started on Vitamin D 50 K units x 12 weeks     Health Maintenance/ Wellness  Pneumococcal vaccine: PNA 20 today  TDAP: 8/26/2012, discuss at next visit  Influenza vaccine: due next flu season  Shingrix vaccine: Given #1 last visit 6/24/22, 2nd given today 9/22/22  Varices Screening (EGD): 11/28/22  Lung Cancer Screening: not indicated, nonsmoker  Hepatitis Panel: 11/27/20 nonreactive  COVID-19:  Vaccinated x2  Ordered cologuard     Counseling:  - Patient instructed to limit alcohol use  - Patient counselled on smoking cessation  - Educated on diet (portion control) and exercise (at least 30 minutes per day)  - Relevant educational materials provided    RTC in 2 months with Dr. Singh

## 2023-01-04 LAB — NONINV COLON CA DNA+OCC BLD SCRN STL QL: NEGATIVE

## 2023-02-08 ENCOUNTER — OFFICE VISIT (OUTPATIENT)
Dept: INTERNAL MEDICINE | Facility: CLINIC | Age: 58
End: 2023-02-08
Payer: MEDICAID

## 2023-02-08 VITALS
WEIGHT: 194 LBS | TEMPERATURE: 98 F | SYSTOLIC BLOOD PRESSURE: 129 MMHG | BODY MASS INDEX: 28.73 KG/M2 | RESPIRATION RATE: 18 BRPM | OXYGEN SATURATION: 100 % | HEIGHT: 69 IN | DIASTOLIC BLOOD PRESSURE: 86 MMHG | HEART RATE: 73 BPM

## 2023-02-08 DIAGNOSIS — Z12.11 SCREEN FOR COLON CANCER: ICD-10-CM

## 2023-02-08 DIAGNOSIS — G89.29 CHRONIC BILATERAL LOW BACK PAIN WITHOUT SCIATICA: ICD-10-CM

## 2023-02-08 DIAGNOSIS — F10.20 ALCOHOLISM: ICD-10-CM

## 2023-02-08 DIAGNOSIS — F10.10 ALCOHOL ABUSE: ICD-10-CM

## 2023-02-08 DIAGNOSIS — J31.0 RHINITIS, UNSPECIFIED TYPE: ICD-10-CM

## 2023-02-08 DIAGNOSIS — K70.30 ALCOHOLIC CIRRHOSIS OF LIVER WITHOUT ASCITES: Primary | ICD-10-CM

## 2023-02-08 DIAGNOSIS — A04.8 H. PYLORI INFECTION: ICD-10-CM

## 2023-02-08 DIAGNOSIS — I10 HYPERTENSION, UNSPECIFIED TYPE: ICD-10-CM

## 2023-02-08 DIAGNOSIS — E55.9 VITAMIN D DEFICIENCY: ICD-10-CM

## 2023-02-08 DIAGNOSIS — M54.50 CHRONIC BILATERAL LOW BACK PAIN WITHOUT SCIATICA: ICD-10-CM

## 2023-02-08 DIAGNOSIS — Z23 TETANUS-DIPHTHERIA (TD) VACCINATION: ICD-10-CM

## 2023-02-08 PROCEDURE — 90471 IMMUNIZATION ADMIN: CPT | Mod: PBBFAC

## 2023-02-08 PROCEDURE — 99214 OFFICE O/P EST MOD 30 MIN: CPT | Mod: PBBFAC

## 2023-02-08 PROCEDURE — 90715 TDAP VACCINE 7 YRS/> IM: CPT | Mod: PBBFAC

## 2023-02-08 RX ORDER — CLARITHROMYCIN 500 MG/1
500 TABLET, FILM COATED ORAL EVERY 12 HOURS
Qty: 28 TABLET | Refills: 0 | Status: CANCELLED | OUTPATIENT
Start: 2023-02-08 | End: 2023-02-22

## 2023-02-08 RX ORDER — AMOXICILLIN 500 MG/1
1000 TABLET, FILM COATED ORAL EVERY 12 HOURS
Qty: 56 TABLET | Refills: 0 | Status: CANCELLED | OUTPATIENT
Start: 2023-02-08 | End: 2023-02-22

## 2023-02-08 RX ORDER — CARVEDILOL 6.25 MG/1
6.25 TABLET ORAL 2 TIMES DAILY WITH MEALS
Qty: 90 TABLET | Refills: 3 | Status: SHIPPED | OUTPATIENT
Start: 2023-02-08 | End: 2023-05-02

## 2023-02-08 RX ORDER — FLUTICASONE PROPIONATE 50 MCG
1 SPRAY, SUSPENSION (ML) NASAL DAILY
Qty: 18.2 ML | Refills: 11 | Status: SHIPPED | OUTPATIENT
Start: 2023-02-08 | End: 2023-05-29 | Stop reason: SDUPTHER

## 2023-02-08 RX ORDER — PANTOPRAZOLE SODIUM 40 MG/1
40 TABLET, DELAYED RELEASE ORAL DAILY
Qty: 90 TABLET | Refills: 1 | Status: SHIPPED | OUTPATIENT
Start: 2023-02-08 | End: 2023-05-02 | Stop reason: SDUPTHER

## 2023-02-08 RX ORDER — LORATADINE 10 MG/1
10 TABLET ORAL DAILY
Qty: 90 TABLET | Refills: 3 | Status: SHIPPED | OUTPATIENT
Start: 2023-02-08 | End: 2023-11-15 | Stop reason: SDUPTHER

## 2023-02-08 RX ORDER — VIT C/E/ZN/COPPR/LUTEIN/ZEAXAN 250MG-90MG
1000 CAPSULE ORAL DAILY
Qty: 90 CAPSULE | Refills: 3 | Status: SHIPPED | OUTPATIENT
Start: 2023-02-08 | End: 2023-05-29 | Stop reason: SDUPTHER

## 2023-02-08 RX ORDER — FOLIC ACID 1 MG/1
1 TABLET ORAL DAILY
Qty: 90 TABLET | Refills: 3 | Status: SHIPPED | OUTPATIENT
Start: 2023-02-08 | End: 2023-05-02 | Stop reason: SDUPTHER

## 2023-02-08 RX ORDER — METHOCARBAMOL 750 MG/1
750 TABLET, FILM COATED ORAL 2 TIMES DAILY PRN
Qty: 30 TABLET | Refills: 2 | Status: SHIPPED | OUTPATIENT
Start: 2023-02-08 | End: 2023-05-29 | Stop reason: SDUPTHER

## 2023-02-08 RX ORDER — PANTOPRAZOLE SODIUM 40 MG/1
40 TABLET, DELAYED RELEASE ORAL DAILY
Qty: 90 TABLET | Refills: 1 | Status: CANCELLED | OUTPATIENT
Start: 2023-02-08 | End: 2024-02-08

## 2023-02-08 NOTE — PROGRESS NOTES
"  [facility]  CLINIC NOTE    Patient Name: Ger Denton  YOB: 1965  Chief Complaint: No chief complaint on file.     PRESENTING HISTORY   History of Present Illness:  Mr. Ger Denton is a 57 y.o. male w/ PMHx of alcoholism, alcoholic liver cirrhosis, hypertension, vitamin D deficiency who presents to internal medicine clinic for follow up.  He is endorsing complete cessation of alcohol for the last 2 months. Patient's only complaint today is chronic rhinitis, which he has been experiencing for ~3 months now. Otherwise, patient has no complaints.       Review of Systems:  12 point review of symptoms negative unless otherwise stated above    PAST HISTORY:     Past Medical History:   Diagnosis Date    Cirrhosis     Hypertension         Past Surgical History:   Procedure Laterality Date    none         No family history on file.    Social History     Socioeconomic History    Marital status: Single   Tobacco Use    Smoking status: Never    Smokeless tobacco: Never   Substance and Sexual Activity    Alcohol use: Yes     Alcohol/week: 24.0 standard drinks     Types: 24 Cans of beer per week     Comment: weekends; take medication during the week    Drug use: Yes     Types: Marijuana     Comment: states "every now and then"    Sexual activity: Not Currently     Social Determinants of Health     Financial Resource Strain: High Risk    Difficulty of Paying Living Expenses: Hard   Food Insecurity: Food Insecurity Present    Worried About Running Out of Food in the Last Year: Sometimes true    Ran Out of Food in the Last Year: Never true   Transportation Needs: No Transportation Needs    Lack of Transportation (Medical): No    Lack of Transportation (Non-Medical): No   Physical Activity: Inactive    Days of Exercise per Week: 0 days    Minutes of Exercise per Session: 0 min   Stress: No Stress Concern Present    Feeling of Stress : Only a little   Social Connections: Socially Isolated    " @5517 Called Gen Surg per Deborah  \RE:GURVINDER  @ [facility] was in department Frequency of Communication with Friends and Family: Twice a week    Frequency of Social Gatherings with Friends and Family: Never    Attends Islam Services: Never    Active Member of Clubs or Organizations: No    Attends Club or Organization Meetings: Never    Marital Status: Never    Housing Stability: Low Risk     Unable to Pay for Housing in the Last Year: No    Number of Places Lived in the Last Year: 1    Unstable Housing in the Last Year: No       MEDICATIONS:     Current Outpatient Medications   Medication Instructions    carvediloL (COREG) 6.25 mg, Oral, 2 times daily with meals    ergocalciferol (ERGOCALCIFEROL) 50,000 Units, Oral, Every 7 days    folic acid (FOLVITE) 1 mg, Oral, Daily    methocarbamoL (ROBAXIN) 750 mg, Oral, 2 times daily PRN    pantoprazole (PROTONIX) 40 mg, Oral, Daily, 30 min before breakfast        OBJECTIVE:   Vital Signs:  There were no vitals filed for this visit.         Physical Exam:  General: well-developed well-nourished in no acute distress  Eye: PERRLA, EOMI, clear conjunctiva, eyelids normal  HENT: Head-normocephalic and atraumatic  Neck: full range of motion, no thyromegaly or lymphadenopathy, trachea midline, supple, no palpable thyroid nodules  Respiratory: clear to auscultation bilaterally without wheezes, rales, rhonchi  Cardiovascular: regular rate and rhythm without murmurs.  No gallops or rubs no JVD.  Capillary refill within normal limits.  Gastrointestinal: soft, non-tender, non-distended with normal bowel sounds, without masses to palpation  Genitourinary: no CVA tenderness to palpation  Musculoskeletal: full range of motion of all extremities/spine without limitation or discomfort  Integumentary: no rashes or skin lesions present  Neurologic: no signs of peripheral neurological deficit, motor/sensory function intact  Psychiatric:  alert and oriented, cognitive function intact, cooperative with exam, good eye contact, judgement and insight intact, mood  and affect full range.    Laboratory  Lab Results   Component Value Date     09/07/2022    K 4.2 09/07/2022    CO2 26 09/07/2022    BUN 7.1 (L) 09/07/2022    CREATININE 0.94 09/07/2022    CALCIUM 10.0 09/07/2022    BILIDIR 0.3 07/29/2021    IBILI 0.30 07/29/2021    ALKPHOS 72 09/07/2022    AST 59 (H) 09/07/2022    ALT 31 09/07/2022    MG 1.30 (L) 06/10/2022    PHOS 3.5 02/22/2021        Lab Results   Component Value Date    WBC 4.6 09/07/2022    RBC 4.45 (L) 09/07/2022    HGB 13.4 (L) 09/07/2022    HCT 39.9 (L) 09/07/2022    MCV 89.7 09/07/2022    MCH 30.1 09/07/2022    MCHC 33.6 09/07/2022    RDW 15.0 09/07/2022     (L) 09/07/2022    MPV 10.4 09/07/2022        Diagnostic Results:  X-Ray Chest PA And Lateral    Result Date: 6/10/2022  EXAMINATION:  XR CHEST PA AND LATERAL    CLINICAL HISTORY:  Other chest pain;    TECHNIQUE:  PA and lateral views of the chest.    COMPARISON:  9 August 2019    FINDINGS:  Lines/tubes/devices: none present    The cardiomediastinal silhouette and central pulmonary vasculature are unremarkable contour and size.  The trachea is midline. There is no lobar consolidation, pleural effusion, or pneumothorax.    There is no acute osseous or extrathoracic abnormality.          X-Ray Lumbar Spine Ap And Lateral    Result Date: 6/10/2022  EXAMINATION:  XR LUMBAR SPINE AP AND LATERAL    CLINICAL HISTORY:  lower back pain;    COMPARISON:  None.    FINDINGS:  There are 5 non-rib-bearing lumbar type vertebral bodies.  There is minimal anterolisthesis of L4 over L5.  The vertebral body heights are maintained.  There is mild disc height loss at L4-5 with small multilevel marginal osteophytes.  There is moderate facet arthropathy in the lower lumbar spine.  The soft tissues are unremarkable.         No results found in the last 24 hours.     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  - Current MELD 7, Child Jean Baptiste Class A  - Patient has stopped  drinking for 2 months  - Refilled folic acid 1 mg once daily  - EGD 11/28/22 unremarkable for varices  - US Abdomen 11/1/22 revealing mild to mod hepatic steatosis, unchanged to mildly improved since prior US    Chronic Rhinitis  - Prescribed Flonase 1 spray/nostril and Loratadine 10 mg daily     Anemia of Chronic Disease  - H/H stable, will continue annual CBCs    Helicobacter Pylori Infection (Treated)  - Completed Pylera  - Ordered H. Pylori Fecal Antigen  - Stomach, Antrum biopsy 11/28/22 revealing chronic active gastritis and numerous helicobacter-like organisms    HTN  - /87  - Refilled Coreg 6.25 mg BID    Vitamin D Deficiency  - Vitamin D 24.6 (L) 9/26/22  - Started Vit D 1000 units daily    Health Maintenance/ Wellness  Pneumococcal vaccine: not indicated at this time  TDAP: Tdap given today; next due 2/8/2033  Influenza vaccine: due next flu season  Shingrix vaccine: Completed; 6/24/22, 9/22/22  Varices Screening (EGD): Completed for 11/28/22 by Dr. Green; Unremarkable for varices; H. Pylori positive biopsy on lesion in antrum; continue EGD varices screening every 1-2 years  Lung Cancer Screening: not indicated, nonsmoker  Hepatitis Panel: 11/27/20 nonreactive  HIV/Syphillis screening: nonreactive 11/27/20  Colon Cancer Screening: Cologuard neg 12/23/22; next due 12/23/25  COVID-19: Pfizer 6/17/21, 5/28/21; Patient refusing COVID boosters at this time  Last A1c 4.6 9/26/22      Counseling:  - Patient instructed to limit alcohol use  - Patient counselled on smoking cessation  - Educated on diet (portion control) and exercise (at least 30 minutes per day)  - Relevant educational materials provided    Labs ordered: H. Pylori stool antigen  Imaging: Standing order for US Liver q6mo for HCC screening  Medications: reconciled, discussed and refills given    RTC in 3 months    Natan Singh MD  02/08/2023, 8:47 AM      Answers submitted by the patient for this visit:  Review of Systems Questionnaire  (Submitted on 2/1/2023)  activity change: No  unexpected weight change: No  neck pain: No  hearing loss: No  trouble swallowing: No  eye discharge: No  chest tightness: No  wheezing: No  chest pain: No  palpitations: No  constipation: No  vomiting: No  diarrhea: No  difficulty urinating: No  hematuria: No  headaches: No  dysphoric mood: No

## 2023-02-15 ENCOUNTER — HOSPITAL ENCOUNTER (OUTPATIENT)
Dept: RADIOLOGY | Facility: HOSPITAL | Age: 58
Discharge: HOME | End: 2023-02-15
Payer: MEDICAID

## 2023-02-15 DIAGNOSIS — K70.30 ALCOHOLIC CIRRHOSIS OF LIVER WITHOUT ASCITES: ICD-10-CM

## 2023-02-15 PROCEDURE — 76705 ECHO EXAM OF ABDOMEN: CPT | Mod: TC

## 2023-05-01 ENCOUNTER — OFFICE VISIT (OUTPATIENT)
Dept: GASTROENTEROLOGY | Facility: CLINIC | Age: 58
End: 2023-05-01
Payer: MEDICAID

## 2023-05-01 VITALS
SYSTOLIC BLOOD PRESSURE: 140 MMHG | HEIGHT: 69 IN | WEIGHT: 188 LBS | RESPIRATION RATE: 18 BRPM | BODY MASS INDEX: 27.85 KG/M2 | DIASTOLIC BLOOD PRESSURE: 88 MMHG | HEART RATE: 89 BPM | TEMPERATURE: 98 F

## 2023-05-01 DIAGNOSIS — K29.70 HELICOBACTER PYLORI GASTRITIS: ICD-10-CM

## 2023-05-01 DIAGNOSIS — B96.81 HELICOBACTER PYLORI GASTRITIS: ICD-10-CM

## 2023-05-01 DIAGNOSIS — K70.30 ALCOHOLIC CIRRHOSIS OF LIVER WITHOUT ASCITES: Primary | ICD-10-CM

## 2023-05-01 LAB
ALBUMIN SERPL-MCNC: 3.8 G/DL (ref 3.5–5)
ALBUMIN/GLOB SERPL: 0.9 RATIO (ref 1.1–2)
ALP SERPL-CCNC: 75 UNIT/L (ref 40–150)
ALT SERPL-CCNC: 92 UNIT/L (ref 0–55)
AST SERPL-CCNC: 278 UNIT/L (ref 5–34)
BILIRUBIN DIRECT+TOT PNL SERPL-MCNC: 0.7 MG/DL
BUN SERPL-MCNC: 10.9 MG/DL (ref 8.4–25.7)
CALCIUM SERPL-MCNC: 9.1 MG/DL (ref 8.4–10.2)
CHLORIDE SERPL-SCNC: 101 MMOL/L (ref 98–107)
CO2 SERPL-SCNC: 24 MMOL/L (ref 22–29)
CREAT SERPL-MCNC: 0.91 MG/DL (ref 0.73–1.18)
GFR SERPLBLD CREATININE-BSD FMLA CKD-EPI: >60 MLS/MIN/1.73/M2
GLOBULIN SER-MCNC: 4.4 GM/DL (ref 2.4–3.5)
GLUCOSE SERPL-MCNC: 138 MG/DL (ref 74–100)
POTASSIUM SERPL-SCNC: 3.3 MMOL/L (ref 3.5–5.1)
PROT SERPL-MCNC: 8.2 GM/DL (ref 6.4–8.3)
SODIUM SERPL-SCNC: 135 MMOL/L (ref 136–145)

## 2023-05-01 PROCEDURE — 85610 PROTHROMBIN TIME: CPT | Performed by: STUDENT IN AN ORGANIZED HEALTH CARE EDUCATION/TRAINING PROGRAM

## 2023-05-01 PROCEDURE — 36415 COLL VENOUS BLD VENIPUNCTURE: CPT | Performed by: STUDENT IN AN ORGANIZED HEALTH CARE EDUCATION/TRAINING PROGRAM

## 2023-05-01 PROCEDURE — 80053 COMPREHEN METABOLIC PANEL: CPT | Performed by: STUDENT IN AN ORGANIZED HEALTH CARE EDUCATION/TRAINING PROGRAM

## 2023-05-01 PROCEDURE — 99213 OFFICE O/P EST LOW 20 MIN: CPT | Mod: PBBFAC | Performed by: STUDENT IN AN ORGANIZED HEALTH CARE EDUCATION/TRAINING PROGRAM

## 2023-05-01 NOTE — PROGRESS NOTES
Subjective     Patient ID: Ger Denton is a 57 y.o. male.    Chief Complaint: Cirrhosis    56-year-old male, history of profound alcoholism, presenting to clinic today for initial appointment with me.  Was last seen by his PCP in April 2022, right personally staffed the resident, he at that point had stated he was drinking a 40 oz of beer a day, that it was affecting his lifestyle relationships with others.  Currently unemployed lives with his sister.  He is currently taking disulfiram for and states he has cut back his drinking to a 6 pack on Saturdays and Sundays where he does not take his aforementioned medication.  He recently had an ER visit June 2022 for volume depletion SEUN, was given IV fluids and discharged, however his lab work at that time it showed his platelets had decreased from a baseline of around 123, now at 76.  I had a kelsey discussion with him today in the room but he is already showing signs of decompensation with decreased platelet count now 76, and that today we need to get updated MELD labs to further ascertain his extent of decompensation.  He denies any melena, hematochezia, hematemesis, or jaundice.  Denies any abdominal lower extremity swelling.  He is overdue for updated EGD, on abdominal ultrasound his last abdominal ultrasound performed November 2021 showed hepatic steatosis.  His last MELD of June 2021 was 11, child Jean Baptiste score class A. He is currently taking diclofenac for reasons unknown, discussed with him will be stopping this medication today.       10/31/22:  No complaints today, states states he still drinks 1 beer on occasion maybe 1 beer per week.  Last saw PCP in September, already with much improvement in his laboratory findings including his platelet count was now back to 209.  He is still taking disulfiram.  Did not get his updated ultrasound of the abdomen for HCC screening will reorder.  Current MELD is 9, Child Jean Baptiste class a.  Denies any melena, hematochezia, jaundice,  hematemesis.    Today's visit:  Relapsed, now drinking 1-240 oz beers per day, previously had actually been improving and maintain abstinence from alcohol when I last saw him in October, his platelet count had been improving, his most recent ultrasound done in February can actually shown marked improvement in his steatosis, and as far as his EGD that was performed November 2022, he had a normal esophagus and duodenum.  However his antrum was biopsied the did show positive for H pylori he completed 10 day course of Pylera.  We will need to repeat his stool antigen.  Does need up-to-date MELD labs as well.  Discussed with him at length, with his current relapse, he states he will attempt to try and maintain abstinence again as he had done last year given his marked improvement overall at that time.  He sees his PCP, Dr. Singh tomorrow.  Denies any melena, hematochezia, jaundice.    Review of Systems   Constitutional: Negative.    HENT: Negative.     Eyes: Negative.    Respiratory: Negative.     Cardiovascular: Negative.    Gastrointestinal: Negative.    Endocrine: Negative.    Genitourinary: Negative.    Musculoskeletal: Negative.    Integumentary:  Negative.   Allergic/Immunologic: Negative.    Neurological: Negative.    Hematological: Negative.    Psychiatric/Behavioral: Negative.          Objective   Vitals:    05/01/23 1430   BP: (!) 145/98   Pulse: 89   Resp: 18   Temp: 98.3 °F (36.8 °C)         Physical Exam  Constitutional:       Appearance: Normal appearance. He is obese.   HENT:      Head: Normocephalic and atraumatic.      Mouth/Throat:      Mouth: Mucous membranes are moist.      Pharynx: Oropharynx is clear.   Eyes:      Pupils: Pupils are equal, round, and reactive to light.   Cardiovascular:      Rate and Rhythm: Normal rate and regular rhythm.      Pulses: Normal pulses.      Heart sounds: Normal heart sounds.   Pulmonary:      Effort: Pulmonary effort is normal.      Breath sounds: Normal breath  sounds.   Abdominal:      General: Abdomen is flat. Bowel sounds are normal.   Musculoskeletal:         General: Normal range of motion.   Skin:     General: Skin is warm and dry.   Neurological:      General: No focal deficit present.      Mental Status: He is alert and oriented to person, place, and time. Mental status is at baseline.   Psychiatric:         Mood and Affect: Mood normal.         Thought Content: Thought content normal.         Judgment: Judgment normal.        Assessment and Plan     Problem List Items Addressed This Visit          GI    Alcoholic cirrhosis of liver without ascites - Primary    Relevant Orders    Protime-INR    Comprehensive Metabolic Panel     Other Visit Diagnoses       Helicobacter pylori gastritis        Relevant Orders    Helicobacter Pylori Antigen Fecal EIA            Background:  Alcohol:  Yes, back to drinking 1-2x40 oz beers per day.     Tobacco: yes     Liver disease: ETOH     MELD-Na: 9, need updated MELD today  Child-Jean Baptiste Class: A     Transplant: not under evaluation, low MELD     Cirrhosis is decompensated with:     Ascites: no  Spontaneous bacterial peritonitis: No  Hepatic Encephalopathy: No  Hepatocellular carcinoma: No  Hepatorenal syndrome: No  Hyponatremia: yes  Muscle wasting: No  Portal vein thrombosis: No     Screening:  Last EGD:  November 28, 2022: Normal esophagus and duodenum.  Antrum revealed evidence of gastritis was biopsied, positive for Helicobacter pylori.        Last imaging study:  Abd US February 2023: Marked improvement in steatosis.  Cholelithiasis present.     CIRRHOSIS COUNSELING:  - strict abstinence of alcohol use  - low sodium (salt) 2000mg per day  - Nutrition: 25-30kcal (calorie per body weight in kilogram) per day  - No need to restrict protein in diet  - High protein diet: 1.2-1.5 gram/kg (protein per body weight in kg) per day to prevent muscle mass loss  - Resistance exercises for muscle strength  - Avoid raw seafoods due to risk of  fatal Vibrio vulnificus infection  - Avoid Non-steroidal anti-inflammatory drugs (NSAIDs) such as ibuprofen, Motrin, naproxen, Aleve due to risk of kidney damage  - Can take acetaminophen (tylenol), no more than 2000mg per day  - Compliance with all medications  - Ultrasound or MRI of the liver every 6 months for liver cancer screening  - Upper endoscopy every 1-2 years to screen for varices          Six-month follow-up, abdominal ultrasound for HCC screening will be due August of this year, updated MELD labs today.    Will need H pylori stool antigen collected to review that treatment has been achieved.    Will also need a FibroScan.    We will see his PCP tomorrow.

## 2023-05-02 ENCOUNTER — OFFICE VISIT (OUTPATIENT)
Dept: INTERNAL MEDICINE | Facility: CLINIC | Age: 58
End: 2023-05-02
Payer: MEDICAID

## 2023-05-02 VITALS
DIASTOLIC BLOOD PRESSURE: 84 MMHG | RESPIRATION RATE: 18 BRPM | HEIGHT: 69 IN | WEIGHT: 188.81 LBS | TEMPERATURE: 99 F | SYSTOLIC BLOOD PRESSURE: 151 MMHG | HEART RATE: 86 BPM | BODY MASS INDEX: 27.96 KG/M2 | OXYGEN SATURATION: 99 %

## 2023-05-02 DIAGNOSIS — F10.20 ALCOHOLISM: ICD-10-CM

## 2023-05-02 DIAGNOSIS — F10.939 ALCOHOL WITHDRAWAL SYNDROME WITH COMPLICATION: Primary | ICD-10-CM

## 2023-05-02 DIAGNOSIS — J31.0 RHINITIS, UNSPECIFIED TYPE: ICD-10-CM

## 2023-05-02 DIAGNOSIS — Z12.11 SCREEN FOR COLON CANCER: ICD-10-CM

## 2023-05-02 DIAGNOSIS — E55.9 VITAMIN D DEFICIENCY: ICD-10-CM

## 2023-05-02 DIAGNOSIS — K70.30 ALCOHOLIC CIRRHOSIS OF LIVER WITHOUT ASCITES: ICD-10-CM

## 2023-05-02 DIAGNOSIS — A04.8 H. PYLORI INFECTION: ICD-10-CM

## 2023-05-02 DIAGNOSIS — F10.10 ALCOHOL ABUSE: ICD-10-CM

## 2023-05-02 DIAGNOSIS — I10 HYPERTENSION, UNSPECIFIED TYPE: ICD-10-CM

## 2023-05-02 PROCEDURE — 99214 OFFICE O/P EST MOD 30 MIN: CPT | Mod: PBBFAC

## 2023-05-02 RX ORDER — NALTREXONE HYDROCHLORIDE 50 MG/1
50 TABLET, FILM COATED ORAL DAILY
Qty: 30 TABLET | Refills: 11 | Status: SHIPPED | OUTPATIENT
Start: 2023-05-02 | End: 2023-05-29 | Stop reason: SDUPTHER

## 2023-05-02 RX ORDER — DIAZEPAM 10 MG/1
TABLET ORAL
Qty: 10 TABLET | Refills: 0 | Status: SHIPPED | OUTPATIENT
Start: 2023-05-02 | End: 2023-05-06

## 2023-05-02 RX ORDER — DIAZEPAM 10 MG/1
10 TABLET ORAL EVERY 6 HOURS PRN
Qty: 10 TABLET | Refills: 0 | Status: SHIPPED | OUTPATIENT
Start: 2023-05-02 | End: 2023-05-29

## 2023-05-02 RX ORDER — CARVEDILOL 6.25 MG/1
6.25 TABLET ORAL 2 TIMES DAILY WITH MEALS
Qty: 60 TABLET | Refills: 12 | Status: SHIPPED | OUTPATIENT
Start: 2023-05-02 | End: 2023-05-29 | Stop reason: SDUPTHER

## 2023-05-02 RX ORDER — FOLIC ACID 1 MG/1
1 TABLET ORAL DAILY
Qty: 90 TABLET | Refills: 3 | Status: SHIPPED | OUTPATIENT
Start: 2023-05-02 | End: 2023-05-29 | Stop reason: SDUPTHER

## 2023-05-02 RX ORDER — PANTOPRAZOLE SODIUM 40 MG/1
40 TABLET, DELAYED RELEASE ORAL DAILY
Qty: 90 TABLET | Refills: 1 | Status: SHIPPED | OUTPATIENT
Start: 2023-05-02 | End: 2023-08-24 | Stop reason: SDUPTHER

## 2023-05-02 NOTE — PROGRESS NOTES
"  INTERNAL MEDICINE RESIDENT CLINIC  CLINIC NOTE    Patient Name: Ger Denton  YOB: 1965  Chief Complaint: Follow-up (3 months) and Medication Refill (Asking for medication to help increase his appetite. He states that he does not eat because he is drinking)     PRESENTING HISTORY   History of Present Illness:  Mr. Ger Denton is a 57 y.o. male w/ PMHx of alcoholism, alcoholic liver cirrhosis, hypertension, vitamin D deficiency who presents to internal medicine clinic for follow up. Patient endorsing relapse since Thursday last week, with his last drink being 2 days ago. He has no current complaints. He is tremulous during interview, concerning for alcohol withdrawal. Patient denying any hx of alcohol withdrawal, including denying any history of seizures, diarrhea, visual or auditory hallucinations, insomnia, or palpitations.      Review of Systems:  12 point review of symptoms negative unless otherwise stated above    PAST HISTORY:     Past Medical History:   Diagnosis Date    Cirrhosis     Hypertension         Past Surgical History:   Procedure Laterality Date    ESOPHAGOGASTRODUODENOSCOPY      none         Family History   Problem Relation Age of Onset    Cancer Sister        Social History     Socioeconomic History    Marital status: Single   Tobacco Use    Smoking status: Never    Smokeless tobacco: Never   Substance and Sexual Activity    Alcohol use: Yes     Alcohol/week: 42.0 standard drinks     Types: 42 Cans of beer per week     Comment: 1-2 40oz beer/d    Drug use: Yes     Types: Marijuana     Comment: states "every now and then"    Sexual activity: Not Currently     Social Determinants of Health     Financial Resource Strain: High Risk    Difficulty of Paying Living Expenses: Hard   Food Insecurity: Food Insecurity Present    Worried About Running Out of Food in the Last Year: Sometimes true    Ran Out of Food in the Last Year: Never true   Transportation Needs: No Transportation Needs " "   Lack of Transportation (Medical): No    Lack of Transportation (Non-Medical): No   Physical Activity: Inactive    Days of Exercise per Week: 0 days    Minutes of Exercise per Session: 0 min   Stress: No Stress Concern Present    Feeling of Stress : Only a little   Social Connections: Socially Isolated    Frequency of Communication with Friends and Family: Twice a week    Frequency of Social Gatherings with Friends and Family: Never    Attends [employer] Services: Never    Active Member of Clubs or Organizations: No    Attends Club or Organization Meetings: Never    Marital Status: Never    Housing Stability: Low Risk     Unable to Pay for Housing in the Last Year: No    Number of Places Lived in the Last Year: 1    Unstable Housing in the Last Year: No       MEDICATIONS:     Current Outpatient Medications   Medication Instructions    carvediloL (COREG) 6.25 mg, Oral, 2 times daily with meals    cholecalciferol (vitamin D3) (VITAMIN D3) 1,000 Units, Oral, Daily    fluticasone propionate (FLONASE) 50 mcg, Each Nostril, Daily    folic acid (FOLVITE) 1 mg, Oral, Daily    loratadine (CLARITIN) 10 mg, Oral, Daily    methocarbamoL (ROBAXIN) 750 mg, Oral, 2 times daily PRN    pantoprazole (PROTONIX) 40 mg, Oral, Daily, 30 min before breakfast        OBJECTIVE:   Vital Signs:  Vitals:    05/02/23 1240   BP: (!) 148/85   Pulse: 86   Resp: 18   Temp: 98.7 °F (37.1 °C)   SpO2: 99%   Weight: 85.6 kg (188 lb 12.8 oz)   Height: 5' 9" (1.753 m)            Physical Exam:  Physical Exam  Vitals and nursing note reviewed.   Constitutional:       General: He is not in acute distress.     Appearance: Normal appearance. He is not ill-appearing, toxic-appearing or diaphoretic.      Comments: Tremulous during interview.   HENT:      Head: Normocephalic.   Eyes:      General: No scleral icterus.     Extraocular Movements: Extraocular movements intact.      Pupils: Pupils are equal, round, and reactive to light.   Cardiovascular:    "   Rate and Rhythm: Normal rate and regular rhythm.      Pulses: Normal pulses.      Heart sounds: Normal heart sounds. No murmur heard.  Pulmonary:      Effort: Pulmonary effort is normal. No respiratory distress.      Breath sounds: Normal breath sounds. No wheezing or rales.   Abdominal:      General: Abdomen is flat. There is no distension.      Palpations: Abdomen is soft.      Tenderness: There is no abdominal tenderness. There is no guarding.   Musculoskeletal:         General: No swelling or tenderness. Normal range of motion.      Cervical back: Normal range of motion.      Right lower leg: No edema.      Left lower leg: No edema.   Skin:     General: Skin is warm.      Capillary Refill: Capillary refill takes less than 2 seconds.      Coloration: Skin is not jaundiced or pale.      Findings: No lesion or rash.   Neurological:      General: No focal deficit present.      Mental Status: He is alert and oriented to person, place, and time. Mental status is at baseline.         Laboratory  Lab Results   Component Value Date     (L) 05/01/2023    K 3.3 (L) 05/01/2023    CO2 24 05/01/2023    BUN 10.9 05/01/2023    CREATININE 0.91 05/01/2023    CALCIUM 9.1 05/01/2023    BILIDIR 0.3 07/29/2021    IBILI 0.30 07/29/2021    ALKPHOS 75 05/01/2023     (H) 05/01/2023    ALT 92 (H) 05/01/2023    MG 1.30 (L) 06/10/2022    PHOS 3.5 02/22/2021        Lab Results   Component Value Date    WBC 4.6 09/07/2022    RBC 4.45 (L) 09/07/2022    HGB 13.4 (L) 09/07/2022    HCT 39.9 (L) 09/07/2022    MCV 89.7 09/07/2022    MCH 30.1 09/07/2022    MCHC 33.6 09/07/2022    RDW 15.0 09/07/2022     (L) 09/07/2022    MPV 10.4 09/07/2022        Diagnostic Results:  X-Ray Chest PA And Lateral    Result Date: 6/10/2022  EXAMINATION:  XR CHEST PA AND LATERAL    CLINICAL HISTORY:  Other chest pain;    TECHNIQUE:  PA and lateral views of the chest.    COMPARISON:  9 August 2019    FINDINGS:  Lines/tubes/devices: none  present    The cardiomediastinal silhouette and central pulmonary vasculature are unremarkable contour and size.  The trachea is midline. There is no lobar consolidation, pleural effusion, or pneumothorax.    There is no acute osseous or extrathoracic abnormality.          X-Ray Lumbar Spine Ap And Lateral    Result Date: 6/10/2022  EXAMINATION:  XR LUMBAR SPINE AP AND LATERAL    CLINICAL HISTORY:  lower back pain;    COMPARISON:  None.    FINDINGS:  There are 5 non-rib-bearing lumbar type vertebral bodies.  There is minimal anterolisthesis of L4 over L5.  The vertebral body heights are maintained.  There is mild disc height loss at L4-5 with small multilevel marginal osteophytes.  There is moderate facet arthropathy in the lower lumbar spine.  The soft tissues are unremarkable.         No results found in the last 24 hours.     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  Recent Relapse of Alcoholism  Alcohol Withdrawal  - Current MELD-Na 11; Child Jean Baptiste Class A (updated 5/2/23)  -  Endorsing relapse late last week into yesterday versus 2 days ago; Tremors on exam, denying any current or history of seizures, diarrhea, visual or auditory hallucinations, insomnia, or palpitations   - Prescribed Diazepam taper with emergency Diazepam incase of breakthrough withdrawal symptoms   - Started on Naltrexone 50 mg daily   - Will call patient daily for 3-5 days to check withdrawal status   - See patient back next week for status update  - Continue folic acid 1 mg once daily  - EGD 11/28/22 unremarkable for varices, continue annual screens  - US Abdomen 11/1/22 revealing mild to mod hepatic steatosis, unchanged to mildly improved since prior US; continue bi-annual HCC screening; fibroscan planned per GI    Chronic Rhinitis (resolved)  - Continue Flonase daily    Anemia of Chronic Disease  - H/H stable, will continue annual CBCs    Helicobacter Pylori Infection (Treated)  - Completed  Pylera  - H. Pylori Fecal Antigen pending patient completion (ordered last visit, ordered by GI); patient informed of requirement of test to confirm colonization resolution  - Stomach, Antrum biopsy 11/28/22 revealing chronic active gastritis and numerous helicobacter-like organisms    HTN  - /84  - Continue Coreg 6.25 mg BID    Vitamin D Deficiency  - Vitamin D 24.6 (L) 9/26/22; recheck levels next visit  - Continue Vit D 1000 units daily    Health Maintenance/ Wellness  Pneumococcal vaccination: Completed; Prevnar 20 (12/12/2022)  TDAP: UTD, next due 2/8/2033  Influenza vaccine: due next flu season  Varicella Zoster vaccination: Completed; 6/24/22, 9/22/22  Varices Screening (EGD): Completed for 11/28/22 by Dr. Green; Unremarkable for varices; H. Pylori positive biopsy on lesion in antrum; continue EGD varices screening every 1-2 years  Lung Cancer Screening: not indicated, nonsmoker  Hepatitis Panel: 11/27/20 nonreactive  HIV/Syphillis screening: nonreactive 11/27/20  Colon Cancer Screening: Cologuard neg 12/23/22; next due 12/23/25  COVID-19: Pfizer 6/17/21, 5/28/21; Patient refusing COVID boosters at this time  Last A1c 4.6 9/26/22      Counseling:  - Patient instructed to limit alcohol use  - Patient counselled on smoking cessation  - Educated on diet (portion control) and exercise (at least 30 minutes per day)  - Relevant educational materials provided      RTC in 3 months    Natan Singh MD  [facility], [facility]

## 2023-05-08 ENCOUNTER — PROCEDURE VISIT (OUTPATIENT)
Dept: INFECTIOUS DISEASES | Facility: CLINIC | Age: 58
End: 2023-05-08
Payer: MEDICAID

## 2023-05-08 DIAGNOSIS — K70.30 ALCOHOLIC CIRRHOSIS OF LIVER WITHOUT ASCITES: Primary | ICD-10-CM

## 2023-05-08 DIAGNOSIS — K70.30 ALCOHOLIC CIRRHOSIS OF LIVER WITHOUT ASCITES: ICD-10-CM

## 2023-05-08 PROCEDURE — 91200 LIVER ELASTOGRAPHY: CPT | Mod: PBBFAC | Performed by: STUDENT IN AN ORGANIZED HEALTH CARE EDUCATION/TRAINING PROGRAM

## 2023-05-08 NOTE — PROGRESS NOTES
Patient here for FibroScan visit. Reports NPO X3 hours and denies any implanted electronic devices. Position patient for the procedure to expose the right rib cage. Explained procedure to the patient. FibroScan exam complete, he moved a lot during scan, but tolerated without any problems.

## 2023-05-09 ENCOUNTER — APPOINTMENT (OUTPATIENT)
Dept: LAB | Facility: HOSPITAL | Age: 58
End: 2023-05-09
Attending: STUDENT IN AN ORGANIZED HEALTH CARE EDUCATION/TRAINING PROGRAM
Payer: MEDICAID

## 2023-05-09 LAB — H. PYLORI STOOL: NEGATIVE

## 2023-05-09 NOTE — PROCEDURES
Fibroscan Procedure     Name: Ger Denton  Date of Procedure :   Interpreting Physician: Anderson José MD  Diagnosis: Alcohol    Probe: XL    Fibroscan readin.6 kPa    Fibrosis: F 0-1     CAP readin dB/m    Steatosis: S3      Miscellaneous:

## 2023-05-15 ENCOUNTER — OFFICE VISIT (OUTPATIENT)
Dept: INTERNAL MEDICINE | Facility: CLINIC | Age: 58
End: 2023-05-15
Payer: MEDICAID

## 2023-05-15 VITALS
RESPIRATION RATE: 18 BRPM | WEIGHT: 203.81 LBS | HEART RATE: 78 BPM | TEMPERATURE: 98 F | SYSTOLIC BLOOD PRESSURE: 158 MMHG | BODY MASS INDEX: 30.19 KG/M2 | DIASTOLIC BLOOD PRESSURE: 84 MMHG | HEIGHT: 69 IN

## 2023-05-15 DIAGNOSIS — K70.30 ALCOHOLIC CIRRHOSIS OF LIVER WITHOUT ASCITES: ICD-10-CM

## 2023-05-15 DIAGNOSIS — F10.10 ALCOHOL ABUSE: ICD-10-CM

## 2023-05-15 DIAGNOSIS — E78.5 HYPERLIPIDEMIA, UNSPECIFIED HYPERLIPIDEMIA TYPE: ICD-10-CM

## 2023-05-15 DIAGNOSIS — I10 HYPERTENSION, UNSPECIFIED TYPE: Primary | ICD-10-CM

## 2023-05-15 PROCEDURE — 99214 OFFICE O/P EST MOD 30 MIN: CPT | Mod: PBBFAC | Performed by: STUDENT IN AN ORGANIZED HEALTH CARE EDUCATION/TRAINING PROGRAM

## 2023-05-15 RX ORDER — LISINOPRIL 5 MG/1
5 TABLET ORAL DAILY
Qty: 90 TABLET | Refills: 3 | Status: SHIPPED | OUTPATIENT
Start: 2023-05-15 | End: 2023-05-29 | Stop reason: ALTCHOICE

## 2023-05-15 NOTE — PROGRESS NOTES
"  [facility] INTERNAL MEDICINE  OUTPATIENT OFFICE VISIT NOTE    SUBJECTIVE:   Chief Complaint: 2 week f/u for BP monitoring     HPI: Mr. Denton is a 57 yom with PMHx significant for cirrhosis 2/2 alcohol abuse, HTN who [facility] for 2 week f/u for BP monitoring.   Last seen in clinic by his PCP on 5/2/23, BP at that time documented as 148/85.   Today, /84, reports strict medication compliance, though unable to name his current prescriptions.   Recent CMP from 5/1/23  Still drinking, last drink yesterday, states he drank 12 pack of beer.         Past Medical History:   Diagnosis Date    Cirrhosis     Hypertension        Past Surgical History:   Procedure Laterality Date    ESOPHAGOGASTRODUODENOSCOPY      none         Social History     Socioeconomic History    Marital status: Single   Tobacco Use    Smoking status: Never    Smokeless tobacco: Never   Substance and Sexual Activity    Alcohol use: Yes     Alcohol/week: 42.0 standard drinks     Types: 42 Cans of beer per week     Comment: 1-2 40oz beer/d    Drug use: Not Currently     Types: Marijuana     Comment: states "every now and then"    Sexual activity: Not Currently     Social Determinants of Health     Financial Resource Strain: High Risk    Difficulty of Paying Living Expenses: Hard   Food Insecurity: Food Insecurity Present    Worried About Running Out of Food in the Last Year: Sometimes true    Ran Out of Food in the Last Year: Never true   Transportation Needs: No Transportation Needs    Lack of Transportation (Medical): No    Lack of Transportation (Non-Medical): No   Physical Activity: Inactive    Days of Exercise per Week: 0 days    Minutes of Exercise per Session: 0 min   Stress: No Stress Concern Present    Feeling of Stress : Only a little   Social Connections: Socially Isolated    Frequency of Communication with Friends and Family: Twice a week    Frequency of Social Gatherings with Friends and Family: Never    Attends Sikh " "Services: Never    Active Member of Clubs or Organizations: No    Attends Club or Organization Meetings: Never    Marital Status: Never    Housing Stability: Low Risk     Unable to Pay for Housing in the Last Year: No    Number of Places Lived in the Last Year: 1    Unstable Housing in the Last Year: No           Review of Systems   Constitutional:  Negative for chills.   Respiratory:  Negative for cough, shortness of breath and wheezing.    Cardiovascular:  Negative for chest pain.   Gastrointestinal:  Negative for abdominal pain, nausea and vomiting.   Neurological:  Negative for tremors, sensory change, speech change and focal weakness.   Psychiatric/Behavioral:  Positive for substance abuse.            OBJECTIVE:     Vital signs:   BP (!) 158/84 (BP Location: Right arm, Patient Position: Sitting, BP Method: Large (Manual))   Pulse 78   Temp 98.4 °F (36.9 °C) (Oral)   Resp 18   Ht 5' 9" (1.753 m)   Wt 92.4 kg (203 lb 12.8 oz)   BMI 30.10 kg/m²      Physical Examination:  General: Well nourished w/o distress  Pulm: CTA bilaterally, normal work of breathing  CV: S1, S2 w/o murmurs or gallops; no edema noted  GI: Soft, non-tender to palpation, no masses on palpation  Neuro: AAOx4;motor/sensory function intact, no gross motor deficits noted on exam   Psych: Cooperative; appropriate mood and affect, answers all questions appropriately       Current Outpatient Medications   Medication Instructions    carvediloL (COREG) 6.25 mg, Oral, 2 times daily with meals    cholecalciferol (vitamin D3) (VITAMIN D3) 1,000 Units, Oral, Daily    diazePAM (VALIUM) 10 mg, Oral, Every 6 hours PRN    fluticasone propionate (FLONASE) 50 mcg, Each Nostril, Daily    folic acid (FOLVITE) 1 mg, Oral, Daily    lisinopriL (PRINIVIL,ZESTRIL) 5 mg, Oral, Daily    loratadine (CLARITIN) 10 mg, Oral, Daily    methocarbamoL (ROBAXIN) 750 mg, Oral, 2 times daily PRN    naltrexone (DEPADE) 50 mg, Oral, Daily    pantoprazole (PROTONIX) 40 " mg, Oral, Daily, 30 min before breakfast         ASSESSMENT & PLAN:     Hypertension  -BP: 158/84  -Current medications: Coreg 6.25 mg BID  -starting lisinopril 5mg    Cirrhosis 2/2 Alcohol abuse  -management per PCP; recent Firboscan   -recently prescribed Diazepam taper for withdrawals; encouraged to continue folic acid supplementation   -discussed strict alcohol cessation.  Continues to drink beer. Discussed at length the risks of continued drinking including further damage to liver, as well as cardiovascular risks.        RTC with PCP for BP check after starting lisinopril      Manuel Treviño DO  [facility] PGY3

## 2023-05-29 ENCOUNTER — OFFICE VISIT (OUTPATIENT)
Dept: INTERNAL MEDICINE | Facility: CLINIC | Age: 58
End: 2023-05-29
Payer: MEDICAID

## 2023-05-29 DIAGNOSIS — F10.939 ALCOHOL WITHDRAWAL SYNDROME WITH COMPLICATION: ICD-10-CM

## 2023-05-29 DIAGNOSIS — I10 HYPERTENSION, UNSPECIFIED TYPE: Primary | ICD-10-CM

## 2023-05-29 DIAGNOSIS — M54.50 CHRONIC BILATERAL LOW BACK PAIN WITHOUT SCIATICA: ICD-10-CM

## 2023-05-29 DIAGNOSIS — F10.930 ALCOHOL WITHDRAWAL SYNDROME WITHOUT COMPLICATION: ICD-10-CM
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
## 2023-05-29 DIAGNOSIS — G89.29 CHRONIC BILATERAL LOW BACK PAIN WITHOUT SCIATICA: ICD-10-CM

## 2023-05-29 DIAGNOSIS — J31.0 RHINITIS, UNSPECIFIED TYPE: ICD-10-CM

## 2023-05-29 DIAGNOSIS — E55.9 VITAMIN D DEFICIENCY: ICD-10-CM

## 2023-05-29 DIAGNOSIS — F10.10 ALCOHOL ABUSE: ICD-10-CM

## 2023-05-29 PROCEDURE — 99213 OFFICE O/P EST LOW 20 MIN: CPT | Mod: PBBFAC

## 2023-05-29 RX ORDER — METHOCARBAMOL 750 MG/1
750 TABLET, FILM COATED ORAL 2 TIMES DAILY PRN
Qty: 30 TABLET | Refills: 2 | Status: SHIPPED | OUTPATIENT
Start: 2023-05-29 | End: 2023-11-15

## 2023-05-29 RX ORDER — DIAZEPAM 5 MG/1
TABLET ORAL
Qty: 7 TABLET | Refills: 0 | Status: SHIPPED | OUTPATIENT
Start: 2023-05-29 | End: 2023-08-24 | Stop reason: SDUPTHER

## 2023-05-29 RX ORDER — CARVEDILOL 6.25 MG/1
6.25 TABLET ORAL 2 TIMES DAILY WITH MEALS
Qty: 60 TABLET | Refills: 12 | Status: SHIPPED | OUTPATIENT
Start: 2023-05-29 | End: 2023-11-15 | Stop reason: SDUPTHER

## 2023-05-29 RX ORDER — FLUTICASONE PROPIONATE 50 MCG
1 SPRAY, SUSPENSION (ML) NASAL DAILY
Qty: 18.2 ML | Refills: 11 | Status: SHIPPED | OUTPATIENT
Start: 2023-05-29 | End: 2023-11-15 | Stop reason: SDUPTHER

## 2023-05-29 RX ORDER — NIFEDIPINE 30 MG/1
30 TABLET, EXTENDED RELEASE ORAL DAILY
Qty: 30 TABLET | Refills: 11 | Status: SHIPPED | OUTPATIENT
Start: 2023-05-29 | End: 2023-11-15 | Stop reason: SDUPTHER

## 2023-05-29 RX ORDER — HYDROCORTISONE 1 %
CREAM (GRAM) TOPICAL 2 TIMES DAILY
Qty: 26 G | Refills: 1 | Status: SHIPPED | OUTPATIENT
Start: 2023-05-29 | End: 2023-11-15

## 2023-05-29 RX ORDER — NALTREXONE HYDROCHLORIDE 50 MG/1
50 TABLET, FILM COATED ORAL DAILY
Qty: 90 TABLET | Refills: 3 | Status: SHIPPED | OUTPATIENT
Start: 2023-05-29 | End: 2023-11-06 | Stop reason: SDUPTHER

## 2023-05-29 RX ORDER — VIT C/E/ZN/COPPR/LUTEIN/ZEAXAN 250MG-90MG
1000 CAPSULE ORAL DAILY
Qty: 90 CAPSULE | Refills: 3 | Status: SHIPPED | OUTPATIENT
Start: 2023-05-29 | End: 2023-11-15 | Stop reason: SDUPTHER

## 2023-05-29 RX ORDER — FOLIC ACID 1 MG/1
1 TABLET ORAL DAILY
Qty: 90 TABLET | Refills: 3 | Status: SHIPPED | OUTPATIENT
Start: 2023-05-29 | End: 2023-11-15 | Stop reason: SDUPTHER

## 2023-05-29 NOTE — PROGRESS NOTES
"  INTERNAL MEDICINE RESIDENT CLINIC  CLINIC NOTE    Patient Name: Ger Denton  YOB: 1965  Chief Complaint: Follow-up     PRESENTING HISTORY   History of Present Illness:  Mr. Ger Denton is a 57 y.o. male w/ PMHx of alcoholism, alcoholic liver cirrhosis, hypertension, vitamin D deficiency who presents to internal medicine clinic for follow up. Patient endorsing relapse with cessation shortly from last visit. He has no other complaints at this time. He is tearful on interview, endorsing motivation to quit but admitting potential weakness in his sobriety is peer pressure. Otherwise, patient has no complaints.      Review of Systems:  12 point review of symptoms negative unless otherwise stated above    PAST HISTORY:     Past Medical History:   Diagnosis Date    Cirrhosis     Hypertension         Past Surgical History:   Procedure Laterality Date    ESOPHAGOGASTRODUODENOSCOPY      none         Family History   Problem Relation Age of Onset    Cancer Sister        Social History     Socioeconomic History    Marital status: Single   Tobacco Use    Smoking status: Never    Smokeless tobacco: Never   Substance and Sexual Activity    Alcohol use: Yes     Alcohol/week: 42.0 standard drinks     Types: 42 Cans of beer per week     Comment: 1-2 40oz beer/d    Drug use: Not Currently     Types: Marijuana     Comment: states "every now and then"    Sexual activity: Not Currently     Social Determinants of Health     Financial Resource Strain: High Risk    Difficulty of Paying Living Expenses: Hard   Food Insecurity: Food Insecurity Present    Worried About Running Out of Food in the Last Year: Sometimes true    Ran Out of Food in the Last Year: Never true   Transportation Needs: No Transportation Needs    Lack of Transportation (Medical): No    Lack of Transportation (Non-Medical): No   Physical Activity: Inactive    Days of Exercise per Week: 0 days    Minutes of Exercise per Session: 0 min   Stress: No Stress " Concern Present    Feeling of Stress : Only a little   Social Connections: Socially Isolated    Frequency of Communication with Friends and Family: Twice a week    Frequency of Social Gatherings with Friends and Family: Never    Attends Latter-day Services: Never    Active Member of Clubs or Organizations: No    Attends Club or Organization Meetings: Never    Marital Status: Never    Housing Stability: Low Risk     Unable to Pay for Housing in the Last Year: No    Number of Places Lived in the Last Year: 1    Unstable Housing in the Last Year: No       MEDICATIONS:     Current Outpatient Medications   Medication Instructions    carvediloL (COREG) 6.25 mg, Oral, 2 times daily with meals    cholecalciferol (vitamin D3) (VITAMIN D3) 1,000 Units, Oral, Daily    diazePAM (VALIUM) 10 mg, Oral, Every 6 hours PRN    fluticasone propionate (FLONASE) 50 mcg, Each Nostril, Daily    folic acid (FOLVITE) 1 mg, Oral, Daily    lisinopriL (PRINIVIL,ZESTRIL) 5 mg, Oral, Daily    loratadine (CLARITIN) 10 mg, Oral, Daily    methocarbamoL (ROBAXIN) 750 mg, Oral, 2 times daily PRN    naltrexone (DEPADE) 50 mg, Oral, Daily    pantoprazole (PROTONIX) 40 mg, Oral, Daily, 30 min before breakfast        OBJECTIVE:   Vital Signs:  There were no vitals filed for this visit.           Physical Exam:  Physical Exam  Vitals and nursing note reviewed.   Constitutional:       General: He is not in acute distress.     Appearance: Normal appearance. He is not ill-appearing, toxic-appearing or diaphoretic.      Comments: Non tremulous, calm demeanor.   HENT:      Head: Normocephalic.   Eyes:      General: No scleral icterus.     Extraocular Movements: Extraocular movements intact.      Pupils: Pupils are equal, round, and reactive to light.   Cardiovascular:      Rate and Rhythm: Normal rate and regular rhythm.      Pulses: Normal pulses.      Heart sounds: Normal heart sounds. No murmur heard.  Pulmonary:      Effort: Pulmonary effort is  normal. No respiratory distress.      Breath sounds: Normal breath sounds. No wheezing or rales.   Abdominal:      General: Abdomen is flat. There is no distension.      Palpations: Abdomen is soft.      Tenderness: There is no abdominal tenderness. There is no guarding.   Musculoskeletal:         General: No swelling or tenderness. Normal range of motion.      Cervical back: Normal range of motion.      Right lower leg: No edema.      Left lower leg: No edema.   Skin:     General: Skin is warm.      Capillary Refill: Capillary refill takes less than 2 seconds.      Coloration: Skin is not jaundiced or pale.      Findings: No lesion or rash.   Neurological:      General: No focal deficit present.      Mental Status: He is alert and oriented to person, place, and time. Mental status is at baseline.         Laboratory  Lab Results   Component Value Date     (L) 05/01/2023    K 3.3 (L) 05/01/2023    CO2 24 05/01/2023    BUN 10.9 05/01/2023    CREATININE 0.91 05/01/2023    CALCIUM 9.1 05/01/2023    BILIDIR 0.3 07/29/2021    IBILI 0.30 07/29/2021    ALKPHOS 75 05/01/2023     (H) 05/01/2023    ALT 92 (H) 05/01/2023    MG 1.30 (L) 06/10/2022    PHOS 3.5 02/22/2021        Lab Results   Component Value Date    WBC 4.15 (L) 05/02/2023    RBC 4.51 (L) 05/02/2023    HGB 13.7 (L) 05/02/2023    HCT 39.6 (L) 05/02/2023    MCV 87.8 05/02/2023    MCH 30.4 05/02/2023    MCHC 34.6 05/02/2023    RDW 14.6 05/02/2023    PLT 89 (L) 05/02/2023    MPV 10.6 (H) 05/02/2023        Diagnostic Results:  X-Ray Chest PA And Lateral    Result Date: 6/10/2022  EXAMINATION:  XR CHEST PA AND LATERAL    CLINICAL HISTORY:  Other chest pain;    TECHNIQUE:  PA and lateral views of the chest.    COMPARISON:  9 August 2019    FINDINGS:  Lines/tubes/devices: none present    The cardiomediastinal silhouette and central pulmonary vasculature are unremarkable contour and size.  The trachea is midline. There is no lobar consolidation, pleural  effusion, or pneumothorax.    There is no acute osseous or extrathoracic abnormality.          X-Ray Lumbar Spine Ap And Lateral    Result Date: 6/10/2022  EXAMINATION:  XR LUMBAR SPINE AP AND LATERAL    CLINICAL HISTORY:  lower back pain;    COMPARISON:  None.    FINDINGS:  There are 5 non-rib-bearing lumbar type vertebral bodies.  There is minimal anterolisthesis of L4 over L5.  The vertebral body heights are maintained.  There is mild disc height loss at L4-5 with small multilevel marginal osteophytes.  There is moderate facet arthropathy in the lower lumbar spine.  The soft tissues are unremarkable.         No results found in the last 24 hours.     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  Recent Relapse of Alcoholism  Alcohol Withdrawal  - Current MELD-Na 11; Child Jean Baptiste Class A (updated 5/2/23)  - Last drink yesterday; denying any current or history of seizures, diarrhea, visual or auditory hallucinations, insomnia, or palpitations   - Prescribed Diazepam taper   - Continue Naltrexone 50 mg daily   - Instructed patient to return to AA meetings to obtain a sponsor  - Continue folic acid 1 mg once daily  - EGD 11/28/22 unremarkable for varices, continue annual screens  - US Abdomen 11/1/22 revealing mild to mod hepatic steatosis, unchanged to mildly improved since prior US; continue bi-annual HCC screening; fibroscan planned per GI    Chronic Rhinitis (resolved)  - Continue Flonase daily    Anemia of Chronic Disease  - H/H stable, will continue annual CBCs    Helicobacter Pylori Infection (Treated)  - Completed Pylera  - H. Pylori Fecal Antigen pending patient completion (ordered last visit, ordered by GI); patient informed of requirement of test to confirm colonization resolution  - Stomach, Antrum biopsy 11/28/22 revealing chronic active gastritis and numerous helicobacter-like organisms    HTN  - /68 today  - Continue Coreg 6.25 mg BID  - Stopped Lisinopril 5  mg daily (Liver cirrhosis)  - Started Nifedipine 30 mg XR today    Vitamin D Deficiency  - Vitamin D 24.6 (L) 9/26/22; recheck levels next visit  - Continue Vit D 1000 units daily    Health Maintenance/ Wellness  Pneumococcal vaccination: Completed; Prevnar 20 (12/12/2022)  TDAP: UTD, next due 2/8/2033  Influenza vaccine: due next flu season  Varicella Zoster vaccination: Completed; 6/24/22, 9/22/22  Varices Screening (EGD): Completed for 11/28/22 by Dr. Green; Unremarkable for varices; H. Pylori positive biopsy on lesion in antrum; continue EGD varices screening every 1-2 years  Lung Cancer Screening: not indicated, nonsmoker  Hepatitis Panel: 11/27/20 nonreactive  HIV/Syphillis screening: nonreactive 11/27/20  Colon Cancer Screening: Cologuard neg 12/23/22; next due 12/23/25  COVID-19: Pfizer 6/17/21, 5/28/21; Patient refusing COVID boosters at this time  Last A1c 4.6 9/26/22      Counseling:  - Patient instructed to limit alcohol use  - Patient counselled on smoking cessation  - Educated on diet (portion control) and exercise (at least 30 minutes per day)  - Relevant educational materials provided      RTC in 3 months    Natan Singh MD  [facility], -1

## 2023-08-16 LAB
INR PPP: 1
INR PPP: 1
PROTHROMBIN TIME: 13.1 SECONDS (ref 11.4–14)
PROTHROMBIN TIME: 13.2 SECONDS (ref 11.4–14)

## 2023-08-24 ENCOUNTER — OFFICE VISIT (OUTPATIENT)
Dept: INTERNAL MEDICINE | Facility: CLINIC | Age: 58
End: 2023-08-24
Payer: MEDICAID

## 2023-08-24 VITALS
OXYGEN SATURATION: 97 % | HEIGHT: 69 IN | DIASTOLIC BLOOD PRESSURE: 88 MMHG | RESPIRATION RATE: 18 BRPM | SYSTOLIC BLOOD PRESSURE: 158 MMHG | TEMPERATURE: 98 F | BODY MASS INDEX: 29.18 KG/M2 | HEART RATE: 77 BPM | WEIGHT: 197 LBS

## 2023-08-24 DIAGNOSIS — A04.8 H. PYLORI INFECTION: ICD-10-CM

## 2023-08-24 DIAGNOSIS — K70.30 ALCOHOLIC CIRRHOSIS OF LIVER WITHOUT ASCITES: Primary | ICD-10-CM

## 2023-08-24 PROCEDURE — 99215 OFFICE O/P EST HI 40 MIN: CPT | Mod: PBBFAC

## 2023-08-24 RX ORDER — PANTOPRAZOLE SODIUM 40 MG/1
40 TABLET, DELAYED RELEASE ORAL DAILY
Qty: 90 TABLET | Refills: 3 | Status: SHIPPED | OUTPATIENT
Start: 2023-08-24 | End: 2023-11-15 | Stop reason: SDUPTHER

## 2023-08-24 RX ORDER — LISINOPRIL 5 MG/1
5 TABLET ORAL
COMMUNITY
Start: 2023-06-20 | End: 2023-08-24

## 2023-08-24 NOTE — PROGRESS NOTES
"  INTERNAL MEDICINE RESIDENT CLINIC  CLINIC NOTE    Patient Name: Ger Denton  YOB: 1965  Chief Complaint: No chief complaint on file.     PRESENTING HISTORY   History of Present Illness:  Mr. Ger Denton is a 57 y.o. male w/ PMHx of alcoholism, alcoholic liver cirrhosis, hypertension, vitamin D deficiency who presents to internal medicine clinic for follow up. Patient continues to endorses 1-2 beer consumption daily (25 ounce beers). He plans on complete cessation after his birthday on [birth year]. Patient additionally has not picked up his Nifedipine which was started last visit.      Review of Systems:  12 point review of symptoms negative unless otherwise stated above    PAST HISTORY:     Past Medical History:   Diagnosis Date    Cirrhosis     Hypertension         Past Surgical History:   Procedure Laterality Date    ESOPHAGOGASTRODUODENOSCOPY      none         Family History   Problem Relation Age of Onset    Cancer Sister        Social History     Socioeconomic History    Marital status: Single   Tobacco Use    Smoking status: Never     Passive exposure: Past    Smokeless tobacco: Never   Substance and Sexual Activity    Alcohol use: Yes     Alcohol/week: 42.0 standard drinks of alcohol     Types: 42 Cans of beer per week     Comment: 1-2 40oz beer/d    Drug use: Not Currently     Types: Marijuana     Comment: states "every now and then"    Sexual activity: Not Currently     Social Determinants of Health     Financial Resource Strain: High Risk (6/24/2022)    Overall Financial Resource Strain (CARDIA)     Difficulty of Paying Living Expenses: Hard   Food Insecurity: Food Insecurity Present (6/24/2022)    Hunger Vital Sign     Worried About Running Out of Food in the Last Year: Sometimes true     Ran Out of Food in the Last Year: Never true   Transportation Needs: No Transportation Needs (6/24/2022)    PRAPARE - Transportation     Lack of Transportation (Medical): No     Lack of Transportation " (Non-Medical): No   Physical Activity: Inactive (6/24/2022)    Exercise Vital Sign     Days of Exercise per Week: 0 days     Minutes of Exercise per Session: 0 min   Stress: No Stress Concern Present (6/24/2022)    [employer] - Occupational Stress Questionnaire     Feeling of Stress : Only a little   Social Connections: Socially Isolated (6/24/2022)    Social Connection and Isolation Panel [NHANES]     Frequency of Communication with Friends and Family: Twice a week     Frequency of Social Gatherings with Friends and Family: Never     Attends Nondenominational Services: Never     Active Member of Clubs or Organizations: No     Attends Club or Organization Meetings: Never     Marital Status: Never    Housing Stability: Low Risk  (6/24/2022)    Housing Stability Vital Sign     Unable to Pay for Housing in the Last Year: No     Number of Places Lived in the Last Year: 1     Unstable Housing in the Last Year: No       MEDICATIONS:     Current Outpatient Medications   Medication Instructions    carvediloL (COREG) 6.25 mg, Oral, 2 times daily with meals    cholecalciferol (vitamin D3) (VITAMIN D3) 1,000 Units, Oral, Daily    diazePAM (VALIUM) 5 MG tablet Take 1 tablet (5 mg total) by mouth 2 (two) times a day for 2 days, THEN 0.5 tablets (2.5 mg total) 2 (two) times a day for 2 days, THEN 0.5 tablets (2.5 mg total) once daily for 2 days.    fluticasone propionate (FLONASE) 50 mcg, Each Nostril, Daily    folic acid (FOLVITE) 1 mg, Oral, Daily    hydrocortisone 1 % cream Topical (Top), 2 times daily    lisinopriL (PRINIVIL,ZESTRIL) 5 mg, Oral    loratadine (CLARITIN) 10 mg, Oral, Daily    methocarbamoL (ROBAXIN) 750 mg, Oral, 2 times daily PRN    naltrexone (DEPADE) 50 mg, Oral, Daily    NIFEdipine (PROCARDIA-XL) 30 mg, Oral, Daily    pantoprazole (PROTONIX) 40 mg, Oral, Daily, 30 min before breakfast        OBJECTIVE:   Vital Signs:  Vitals:    08/24/23 1235   BP: (!) 168/100   Pulse: 77  "  Resp: 18   Temp: 98.1 °F (36.7 °C)   TempSrc: Oral   SpO2: 97%   Weight: 89.4 kg (197 lb)   Height: 5' 9" (1.753 m)              Physical Exam:  Physical Exam  Vitals and nursing note reviewed.   Constitutional:       General: He is not in acute distress.     Appearance: Normal appearance. He is not ill-appearing, toxic-appearing or diaphoretic.      Comments: Non tremulous, calm demeanor.   HENT:      Head: Normocephalic.   Eyes:      General: No scleral icterus.     Extraocular Movements: Extraocular movements intact.      Pupils: Pupils are equal, round, and reactive to light.   Cardiovascular:      Rate and Rhythm: Normal rate and regular rhythm.      Pulses: Normal pulses.      Heart sounds: Normal heart sounds. No murmur heard.  Pulmonary:      Effort: Pulmonary effort is normal. No respiratory distress.      Breath sounds: Normal breath sounds. No wheezing or rales.   Abdominal:      General: Abdomen is flat. There is no distension.      Palpations: Abdomen is soft.      Tenderness: There is no abdominal tenderness. There is no guarding.   Musculoskeletal:         General: No swelling or tenderness. Normal range of motion.      Cervical back: Normal range of motion.      Right lower leg: No edema.      Left lower leg: No edema.   Skin:     General: Skin is warm.      Capillary Refill: Capillary refill takes less than 2 seconds.      Coloration: Skin is not jaundiced or pale.      Findings: No lesion or rash.   Neurological:      General: No focal deficit present.      Mental Status: He is alert and oriented to person, place, and time. Mental status is at baseline.           Laboratory  Lab Results   Component Value Date     (L) 05/01/2023    K 3.3 (L) 05/01/2023    CO2 24 05/01/2023    BUN 10.9 05/01/2023    CREATININE 0.91 05/01/2023    CALCIUM 9.1 05/01/2023    BILIDIR 0.3 07/29/2021    IBILI 0.30 07/29/2021    ALKPHOS 75 05/01/2023     (H) 05/01/2023    ALT 92 (H) 05/01/2023    MG 1.30 (L) " 06/10/2022    PHOS 3.5 02/22/2021        Lab Results   Component Value Date    WBC 4.15 (L) 05/02/2023    RBC 4.51 (L) 05/02/2023    HGB 13.7 (L) 05/02/2023    HCT 39.6 (L) 05/02/2023    MCV 87.8 05/02/2023    MCH 30.4 05/02/2023    MCHC 34.6 05/02/2023    RDW 14.6 05/02/2023    PLT 89 (L) 05/02/2023    MPV 10.6 (H) 05/02/2023        Diagnostic Results:  X-Ray Chest PA And Lateral    Result Date: 6/10/2022  EXAMINATION:  XR CHEST PA AND LATERAL    CLINICAL HISTORY:  Other chest pain;    TECHNIQUE:  PA and lateral views of the chest.    COMPARISON:  9 August 2019    FINDINGS:  Lines/tubes/devices: none present    The cardiomediastinal silhouette and central pulmonary vasculature are unremarkable contour and size.  The trachea is midline. There is no lobar consolidation, pleural effusion, or pneumothorax.    There is no acute osseous or extrathoracic abnormality.          X-Ray Lumbar Spine Ap And Lateral    Result Date: 6/10/2022  EXAMINATION:  XR LUMBAR SPINE AP AND LATERAL    CLINICAL HISTORY:  lower back pain;    COMPARISON:  None.    FINDINGS:  There are 5 non-rib-bearing lumbar type vertebral bodies.  There is minimal anterolisthesis of L4 over L5.  The vertebral body heights are maintained.  There is mild disc height loss at L4-5 with small multilevel marginal osteophytes.  There is moderate facet arthropathy in the lower lumbar spine.  The soft tissues are unremarkable.         No results found in the last 24 hours.     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  Recent Relapse of Alcoholism  Alcohol Withdrawal  - Current MELD-Na 11; Child Jean Baptiste Class A (updated 5/2/23)  -Reordered labs today  - Last drink yesterday night, one 25 ounce beer; denying any current or history of seizures, diarrhea, visual or auditory hallucinations, insomnia, or palpitations  - Continues to endorses daily 1-2 beer consumption, plans to completely stop on his birthday [birth year]   - Continue  Naltrexone 50 mg daily   - Instructed patient to return to AA meetings to obtain a sponsor  - Continue folic acid 1 mg once daily  - EGD 11/28/22 unremarkable for varices, continue annual screens  - US Abdomen 11/1/22 revealing mild to mod hepatic steatosis, unchanged to mildly improved since prior US; continue bi-annual HCC screening; fibroscan planned per GI    Chronic Rhinitis (resolved)  - Continue Flonase daily    Anemia of Chronic Disease  - H/H stable, will continue annual CBCs    Helicobacter Pylori Infection (Treated)  - Completed Pylera  - H. Pylori Fecal Antigen pending patient completion (ordered last visit, ordered by GI); patient informed of requirement of test to confirm colonization resolution  - Stomach, Antrum biopsy 11/28/22 revealing chronic active gastritis and numerous helicobacter-like organisms    HTN  - /100 today,  - Continue Coreg 6.25 mg BID  - Stopped Lisinopril 5 mg daily (Liver cirrhosis) - threw away on today's visit due to concern of continued use  - Nifedipine 30 mg XR started last visit was never picked up - re-emphasized with patient which bp medications he is on    Vitamin D Deficiency  - Vitamin D 24.6 (L) 9/26/22; recheck level tocday  - Continue Vit D 1000 units daily    Health Maintenance/ Wellness  Pneumococcal vaccination: Completed; Prevnar 20 (12/12/2022)  TDAP: UTD, next due 2/8/2033  Influenza vaccine: due next flu season  Varicella Zoster vaccination: Completed; 6/24/22, 9/22/22  Varices Screening (EGD): Completed for 11/28/22 by Dr. Green; Unremarkable for varices; H. Pylori positive biopsy on lesion in antrum; continue EGD varices screening every 1-2 years  Lung Cancer Screening: not indicated, nonsmoker  HIV/Hep screening: nonreactive 11/27/20  Colon Cancer Screening: Cologuard neg 12/23/22; next due 12/23/25  COVID-19: Pfizer 6/17/21, 5/28/21; Patient refusing COVID boosters at this time  Last A1c 4.6 9/26/22      Counseling:  - Patient instructed to limit  alcohol use  - Patient counselled on smoking cessation  - Educated on diet (portion control) and exercise (at least 30 minutes per day)  - Relevant educational materials provided      RTC in 3 months      Natan Singh MD  [facility] Internal Medicine, -2

## 2023-08-24 NOTE — PROGRESS NOTES
I have reviewed the notes, assessments, and management plan performed by resident, I concur with his documentation of Ger Denton.

## 2023-08-25 ENCOUNTER — LAB VISIT (OUTPATIENT)
Dept: LAB | Facility: HOSPITAL | Age: 58
End: 2023-08-25
Payer: MEDICAID

## 2023-08-25 DIAGNOSIS — K70.30 ALCOHOLIC CIRRHOSIS OF LIVER WITHOUT ASCITES: ICD-10-CM

## 2023-08-25 LAB
ALBUMIN SERPL-MCNC: 3.3 G/DL (ref 3.5–5)
ALBUMIN/GLOB SERPL: 0.8 RATIO (ref 1.1–2)
ALP SERPL-CCNC: 52 UNIT/L (ref 40–150)
ALT SERPL-CCNC: 35 UNIT/L (ref 0–55)
AST SERPL-CCNC: 57 UNIT/L (ref 5–34)
BILIRUB SERPL-MCNC: 0.3 MG/DL
BUN SERPL-MCNC: 9.4 MG/DL (ref 8.4–25.7)
CALCIUM SERPL-MCNC: 9.5 MG/DL (ref 8.4–10.2)
CHLORIDE SERPL-SCNC: 106 MMOL/L (ref 98–107)
CO2 SERPL-SCNC: 25 MMOL/L (ref 22–29)
CREAT SERPL-MCNC: 1.01 MG/DL (ref 0.73–1.18)
DEPRECATED CALCIDIOL+CALCIFEROL SERPL-MC: 48.5 NG/ML (ref 30–80)
EST. AVERAGE GLUCOSE BLD GHB EST-MCNC: 93.9 MG/DL
GFR SERPLBLD CREATININE-BSD FMLA CKD-EPI: >60 MLS/MIN/1.73/M2
GLOBULIN SER-MCNC: 4.1 GM/DL (ref 2.4–3.5)
GLUCOSE SERPL-MCNC: 90 MG/DL (ref 74–100)
HBA1C MFR BLD: 4.9 %
INR PPP: 1
POTASSIUM SERPL-SCNC: 3.7 MMOL/L (ref 3.5–5.1)
PROT SERPL-MCNC: 7.4 GM/DL (ref 6.4–8.3)
PROTHROMBIN TIME: 12.9 SECONDS (ref 11.4–14)
SODIUM SERPL-SCNC: 141 MMOL/L (ref 136–145)

## 2023-08-25 PROCEDURE — 36415 COLL VENOUS BLD VENIPUNCTURE: CPT

## 2023-08-25 PROCEDURE — 83036 HEMOGLOBIN GLYCOSYLATED A1C: CPT

## 2023-08-25 PROCEDURE — 85610 PROTHROMBIN TIME: CPT

## 2023-08-25 PROCEDURE — 80053 COMPREHEN METABOLIC PANEL: CPT

## 2023-08-25 PROCEDURE — 82306 VITAMIN D 25 HYDROXY: CPT

## 2023-11-06 ENCOUNTER — OFFICE VISIT (OUTPATIENT)
Dept: GASTROENTEROLOGY | Facility: CLINIC | Age: 58
End: 2023-11-06
Payer: MEDICAID

## 2023-11-06 VITALS
WEIGHT: 208 LBS | HEART RATE: 103 BPM | OXYGEN SATURATION: 97 % | RESPIRATION RATE: 16 BRPM | HEIGHT: 69 IN | TEMPERATURE: 99 F | DIASTOLIC BLOOD PRESSURE: 87 MMHG | SYSTOLIC BLOOD PRESSURE: 133 MMHG | BODY MASS INDEX: 30.81 KG/M2

## 2023-11-06 DIAGNOSIS — F10.939 ALCOHOL WITHDRAWAL SYNDROME WITH COMPLICATION: ICD-10-CM

## 2023-11-06 DIAGNOSIS — K70.30 ALCOHOLIC CIRRHOSIS OF LIVER WITHOUT ASCITES: ICD-10-CM

## 2023-11-06 PROCEDURE — 99215 OFFICE O/P EST HI 40 MIN: CPT | Mod: PBBFAC | Performed by: STUDENT IN AN ORGANIZED HEALTH CARE EDUCATION/TRAINING PROGRAM

## 2023-11-06 RX ORDER — NALTREXONE HYDROCHLORIDE 50 MG/1
50 TABLET, FILM COATED ORAL DAILY
Qty: 90 TABLET | Refills: 3 | Status: SHIPPED | OUTPATIENT
Start: 2023-11-06 | End: 2023-11-15 | Stop reason: SDUPTHER

## 2023-11-06 NOTE — PROGRESS NOTES
Subjective     Patient ID: Ger Denton is a 58 y.o. male.    Chief Complaint: Alcoholic Cirrhosis (PT request high dose of medication to help him stop drinking.)    58-year-old male, history of profound alcoholism, presenting to clinic today for initial appointment with me.  Was last seen by his PCP in April 2022, right personally staffed the resident, he at that point had stated he was drinking a 40 oz of beer a day, that it was affecting his lifestyle relationships with others.  Currently unemployed lives with his sister.  He is currently taking disulfiram for and states he has cut back his drinking to a 6 pack on Saturdays and Sundays where he does not take his aforementioned medication.  He recently had an ER visit June 2022 for volume depletion SEUN, was given IV fluids and discharged, however his lab work at that time it showed his platelets had decreased from a baseline of around 123, now at 76.  I had a kelsey discussion with him today in the room but he is already showing signs of decompensation with decreased platelet count now 76, and that today we need to get updated MELD labs to further ascertain his extent of decompensation.  He denies any melena, hematochezia, hematemesis, or jaundice.  Denies any abdominal lower extremity swelling.  He is overdue for updated EGD, on abdominal ultrasound his last abdominal ultrasound performed November 2021 showed hepatic steatosis.  His last MELD of June 2021 was 11, child Jean Baptiste score class A. He is currently taking diclofenac for reasons unknown, discussed with him will be stopping this medication today.       10/31/22:  No complaints today, states states he still drinks 1 beer on occasion maybe 1 beer per week.  Last saw PCP in September, already with much improvement in his laboratory findings including his platelet count was now back to 209.  He is still taking disulfiram.  Did not get his updated ultrasound of the abdomen for HCC screening will reorder.  Current  MELD is 9, Child Jean Baptiste class a.  Denies any melena, hematochezia, jaundice, hematemesis.    5/1/23:  Relapsed, now drinking 1-240 oz beers per day, previously had actually been improving and maintain abstinence from alcohol when I last saw him in October, his platelet count had been improving, his most recent ultrasound done in February can actually shown marked improvement in his steatosis, and as far as his EGD that was performed November 2022, he had a normal esophagus and duodenum.  However his antrum was biopsied the did show positive for H pylori he completed 10 day course of Pylera.  We will need to repeat his stool antigen.  Does need up-to-date MELD labs as well.  Discussed with him at length, with his current relapse, he states he will attempt to try and maintain abstinence again as he had done last year given his marked improvement overall at that time.  He sees his PCP, Dr. Singh tomorrow.  Denies any melena, hematochezia, jaundice.    Today's visit:  Still drinking 1-2x 40 oz beers per day, was prescribed naltrexone by PCP.  States he is had difficulty standing for prolonged periods of time, does not exercise frequently, has difficulty with transportation.  Repeated his stool antigen to determine eradication of H pylori, stool antigen was negative.  Updated MELD is 6, Child Jean Baptiste class a.  Up-to-date on variceal screening next will be due November 2024.  He no showed it seems that his last sonogram appointment, he needs updated HCC screening.  Denies any melena, hematochezia, jaundice, hematemesis.    Review of Systems   Constitutional: Negative.    HENT: Negative.     Eyes: Negative.    Respiratory: Negative.     Cardiovascular: Negative.    Gastrointestinal: Negative.    Endocrine: Negative.    Genitourinary: Negative.    Musculoskeletal: Negative.    Integumentary:  Negative.   Allergic/Immunologic: Negative.    Neurological:  Positive for weakness and coordination difficulties.   Hematological:  Negative.    Psychiatric/Behavioral: Negative.          Objective   Vitals:    11/06/23 1346   BP: 133/87   Pulse: 103   Resp: 16   Temp: 98.5 °F (36.9 °C)         Physical Exam  Constitutional:       Appearance: Normal appearance. He is obese.   HENT:      Head: Normocephalic and atraumatic.      Mouth/Throat:      Mouth: Mucous membranes are moist.      Pharynx: Oropharynx is clear.   Eyes:      Conjunctiva/sclera: Conjunctivae normal.      Pupils: Pupils are equal, round, and reactive to light.   Cardiovascular:      Rate and Rhythm: Normal rate and regular rhythm.      Pulses: Normal pulses.      Heart sounds: Normal heart sounds.   Pulmonary:      Effort: Pulmonary effort is normal.      Breath sounds: Normal breath sounds.   Abdominal:      General: Abdomen is flat. Bowel sounds are normal.      Palpations: Abdomen is soft.   Musculoskeletal:         General: Normal range of motion.   Neurological:      General: No focal deficit present.      Mental Status: He is alert and oriented to person, place, and time. Mental status is at baseline.        Assessment and Plan     1. Alcoholic cirrhosis of liver without ascites  -     Ambulatory referral/consult to Gastroenterology  -     US Abdomen Limited_Liver; Future; Expected date: 11/20/2023    2. Alcohol withdrawal syndrome with complication  -     naltrexone (DEPADE) 50 mg tablet; Take 50 mg by mouth once daily.  Dispense: 90 tablet; Refill: 3        Background:  Alcohol:  Yes, back to drinking 1-2x40 oz beers per day.     Tobacco: yes     Liver disease: ETOH     MELD-Na: 6  Child-Jean Baptiste Class: A     Transplant: not under evaluation, low MELD     Cirrhosis is decompensated with:     Ascites: no  Spontaneous bacterial peritonitis: No  Hepatic Encephalopathy: No  Hepatocellular carcinoma: No  Hepatorenal syndrome: No  Hyponatremia: yes  Muscle wasting: No  Portal vein thrombosis: No     Screening:  Last EGD:  November 28, 2022: Normal esophagus and duodenum.  Antrum  revealed evidence of gastritis was biopsied, positive for Helicobacter pylori.        Last imaging study:  Abd US February 2023: Marked improvement in steatosis.  Cholelithiasis present.     CIRRHOSIS COUNSELING:  - strict abstinence of alcohol use  - low sodium (salt) 2000mg per day  - Nutrition: 25-30kcal (calorie per body weight in kilogram) per day  - No need to restrict protein in diet  - High protein diet: 1.2-1.5 gram/kg (protein per body weight in kg) per day to prevent muscle mass loss  - Resistance exercises for muscle strength  - Avoid raw seafoods due to risk of fatal Vibrio vulnificus infection  - Avoid Non-steroidal anti-inflammatory drugs (NSAIDs) such as ibuprofen, Motrin, naproxen, Aleve due to risk of kidney damage  - Can take acetaminophen (tylenol), no more than 2000mg per day  - Compliance with all medications  - Ultrasound or MRI of the liver every 6 months for liver cancer screening  - Upper endoscopy every 1-2 years to screen for varices      Six-month follow up, updated HCC screening to be performed this month.  Next EGD will be performed November 2024.    Up-to-date on MELD labs.  Refilled naltrexone.    [facility] for [facility] Health and recovery, as they specialize in addiction disorders.      Follow up in about 6 months (around 5/6/2024).

## 2023-11-15 ENCOUNTER — OFFICE VISIT (OUTPATIENT)
Dept: INTERNAL MEDICINE | Facility: CLINIC | Age: 58
End: 2023-11-15
Payer: MEDICAID

## 2023-11-15 VITALS
HEART RATE: 94 BPM | RESPIRATION RATE: 18 BRPM | OXYGEN SATURATION: 99 % | SYSTOLIC BLOOD PRESSURE: 131 MMHG | HEIGHT: 69 IN | BODY MASS INDEX: 30.83 KG/M2 | DIASTOLIC BLOOD PRESSURE: 87 MMHG | WEIGHT: 208.19 LBS | TEMPERATURE: 99 F

## 2023-11-15 DIAGNOSIS — F10.10 ALCOHOL ABUSE: ICD-10-CM

## 2023-11-15 DIAGNOSIS — E55.9 VITAMIN D DEFICIENCY: ICD-10-CM

## 2023-11-15 DIAGNOSIS — E78.5 HYPERLIPIDEMIA, UNSPECIFIED HYPERLIPIDEMIA TYPE: Primary | ICD-10-CM

## 2023-11-15 DIAGNOSIS — F10.939 ALCOHOL WITHDRAWAL SYNDROME WITH COMPLICATION: ICD-10-CM

## 2023-11-15 DIAGNOSIS — A04.8 H. PYLORI INFECTION: ICD-10-CM

## 2023-11-15 DIAGNOSIS — I10 HYPERTENSION, UNSPECIFIED TYPE: ICD-10-CM

## 2023-11-15 DIAGNOSIS — J31.0 RHINITIS, UNSPECIFIED TYPE: ICD-10-CM

## 2023-11-15 PROCEDURE — 99214 OFFICE O/P EST MOD 30 MIN: CPT | Mod: PBBFAC

## 2023-11-15 RX ORDER — FLUTICASONE PROPIONATE 50 MCG
1 SPRAY, SUSPENSION (ML) NASAL DAILY
Qty: 18.2 ML | Refills: 11 | Status: SHIPPED | OUTPATIENT
Start: 2023-11-15 | End: 2024-03-06 | Stop reason: SDUPTHER

## 2023-11-15 RX ORDER — NIFEDIPINE 30 MG/1
30 TABLET, EXTENDED RELEASE ORAL DAILY
Qty: 90 TABLET | Refills: 3 | Status: SHIPPED | OUTPATIENT
Start: 2023-11-15 | End: 2024-03-06 | Stop reason: SDUPTHER

## 2023-11-15 RX ORDER — NALTREXONE HYDROCHLORIDE 50 MG/1
50 TABLET, FILM COATED ORAL DAILY
Qty: 90 TABLET | Refills: 3 | Status: SHIPPED | OUTPATIENT
Start: 2023-11-15 | End: 2024-02-23

## 2023-11-15 RX ORDER — CARVEDILOL 6.25 MG/1
6.25 TABLET ORAL 2 TIMES DAILY WITH MEALS
Qty: 180 TABLET | Refills: 3 | Status: SHIPPED | OUTPATIENT
Start: 2023-11-15 | End: 2024-03-06 | Stop reason: SDUPTHER

## 2023-11-15 RX ORDER — LORATADINE 10 MG/1
10 TABLET ORAL DAILY
Qty: 90 TABLET | Refills: 3 | Status: SHIPPED | OUTPATIENT
Start: 2023-11-15 | End: 2024-03-06 | Stop reason: SDUPTHER

## 2023-11-15 RX ORDER — FOLIC ACID 1 MG/1
1 TABLET ORAL DAILY
Qty: 90 TABLET | Refills: 3 | Status: SHIPPED | OUTPATIENT
Start: 2023-11-15 | End: 2024-03-06 | Stop reason: SDUPTHER

## 2023-11-15 RX ORDER — VIT C/E/ZN/COPPR/LUTEIN/ZEAXAN 250MG-90MG
1000 CAPSULE ORAL DAILY
Qty: 90 CAPSULE | Refills: 3 | Status: SHIPPED | OUTPATIENT
Start: 2023-11-15 | End: 2023-11-15

## 2023-11-15 RX ORDER — PANTOPRAZOLE SODIUM 40 MG/1
40 TABLET, DELAYED RELEASE ORAL DAILY
Qty: 90 TABLET | Refills: 3 | Status: SHIPPED | OUTPATIENT
Start: 2023-11-15 | End: 2024-03-06 | Stop reason: SDUPTHER

## 2023-11-15 NOTE — PROGRESS NOTES
"  INTERNAL MEDICINE RESIDENT CLINIC  CLINIC NOTE    Patient Name: Ger Denton  YOB: 1965  Chief Complaint: Hypertension     PRESENTING HISTORY   History of Present Illness:  Mr. eGr Denton is a 58 y.o. male w/ PMHx of chronic alcoholism, hepatosteatosis, hypertension, and history of vitamin D deficiency who presents to internal medicine clinic for follow up.     Continues to follow in cirrhosis clinic, last seen 11/06/2023.  Continues to drink 1-2 40 oz malt beers daily (old English), states Naltrexone providing no relief.  Denies any epistaxis, gum bleeding, or increased bruising.      Last visit:  Patient continues to endorses 1-2 beer consumption daily (25 ounce beers). He plans on complete cessation after his birthday on [birth year]. Patient additionally has not picked up his Nifedipine which was started last visit.      Review of Systems:  12 point review of symptoms negative unless otherwise stated above    PAST HISTORY:     Past Medical History:   Diagnosis Date    Cirrhosis     Hypertension         Past Surgical History:   Procedure Laterality Date    ESOPHAGOGASTRODUODENOSCOPY      none         Family History   Problem Relation Age of Onset    Cancer Sister        Social History     Socioeconomic History    Marital status: Single   Tobacco Use    Smoking status: Never     Passive exposure: Past    Smokeless tobacco: Never   Substance and Sexual Activity    Alcohol use: Yes     Alcohol/week: 42.0 standard drinks of alcohol     Types: 42 Cans of beer per week     Comment: 1-2 40oz beer/d    Drug use: Not Currently     Types: Marijuana     Comment: states "every now and then"    Sexual activity: Not Currently     Social Determinants of Health     Financial Resource Strain: Low Risk  (11/15/2023)    Overall Financial Resource Strain (CARDIA)     Difficulty of Paying Living Expenses: Not hard at all   Food Insecurity: No Food Insecurity (11/15/2023)    Hunger Vital Sign     Worried About Running " "Out of Food in the Last Year: Never true     Ran Out of Food in the Last Year: Never true   Transportation Needs: No Transportation Needs (11/15/2023)    PRAPARE - Transportation     Lack of Transportation (Medical): No     Lack of Transportation (Non-Medical): No   Physical Activity: Inactive (11/15/2023)    Exercise Vital Sign     Days of Exercise per Week: 0 days     Minutes of Exercise per Session: 0 min   Stress: No Stress Concern Present (11/15/2023)    [employer] [place] [employer] - Occupational Stress Questionnaire     Feeling of Stress : Not at all   Social Connections: Moderately Isolated (11/15/2023)    Social Connection and Isolation Panel [NHANES]     Frequency of Communication with Friends and Family: Once a week     Frequency of Social Gatherings with Friends and Family: More than three times a week     Attends [employer] Services: 1 to 4 times per year     Active Member of Clubs or Organizations: No     Attends Club or Organization Meetings: Never     Marital Status: Never    Housing Stability: Low Risk  (11/15/2023)    Housing Stability Vital Sign     Unable to Pay for Housing in the Last Year: No     Number of Places Lived in the Last Year: 1     Unstable Housing in the Last Year: No       MEDICATIONS:     Current Outpatient Medications   Medication Instructions    carvediloL (COREG) 6.25 mg, Oral, 2 times daily with meals    fluticasone propionate (FLONASE) 50 mcg, Each Nostril, Daily    folic acid (FOLVITE) 1 mg, Oral, Daily    loratadine (CLARITIN) 10 mg, Oral, Daily    naltrexone (DEPADE) 50 mg, Oral, Daily    NIFEdipine (PROCARDIA-XL) 30 mg, Oral, Daily    pantoprazole (PROTONIX) 40 mg, Oral, Daily, 30 min before breakfast        OBJECTIVE:   Vital Signs:  Vitals:    11/15/23 1243   BP: 131/87   Pulse: 94   Resp: 18   Temp: 98.5 °F (36.9 °C)   TempSrc: Oral   SpO2: 99%   Weight: 94.4 kg (208 lb 3.2 oz)   Height: 5' 9" (1.753 m)              Physical Exam:  Physical " Exam  Vitals and nursing note reviewed.   Constitutional:       General: He is not in acute distress.     Appearance: Normal appearance. He is not ill-appearing, toxic-appearing or diaphoretic.      Comments: Non tremulous, calm demeanor.   HENT:      Head: Normocephalic.   Eyes:      General: No scleral icterus.     Extraocular Movements: Extraocular movements intact.      Pupils: Pupils are equal, round, and reactive to light.   Cardiovascular:      Rate and Rhythm: Normal rate and regular rhythm.      Pulses: Normal pulses.      Heart sounds: Normal heart sounds. No murmur heard.  Pulmonary:      Effort: Pulmonary effort is normal. No respiratory distress.      Breath sounds: Normal breath sounds. No wheezing or rales.   Abdominal:      General: Abdomen is flat. There is no distension.      Palpations: Abdomen is soft.      Tenderness: There is no abdominal tenderness. There is no guarding.   Musculoskeletal:         General: No swelling or tenderness. Normal range of motion.      Cervical back: Normal range of motion.      Right lower leg: No edema.      Left lower leg: No edema.   Skin:     General: Skin is warm.      Capillary Refill: Capillary refill takes less than 2 seconds.      Coloration: Skin is not jaundiced or pale.      Findings: No lesion or rash.   Neurological:      General: No focal deficit present.      Mental Status: He is alert and oriented to person, place, and time. Mental status is at baseline.           Laboratory  Lab Results   Component Value Date     08/25/2023    K 3.7 08/25/2023    CO2 25 08/25/2023    BUN 9.4 08/25/2023    CREATININE 1.01 08/25/2023    CALCIUM 9.5 08/25/2023    BILIDIR 0.3 07/29/2021    IBILI 0.30 07/29/2021    ALKPHOS 52 08/25/2023    AST 57 (H) 08/25/2023    ALT 35 08/25/2023    MG 1.30 (L) 06/10/2022    PHOS 3.5 02/22/2021        Lab Results   Component Value Date    WBC 4.15 (L) 05/02/2023    RBC 4.51 (L) 05/02/2023    HGB 13.7 (L) 05/02/2023    HCT 39.6  (L) 05/02/2023    MCV 87.8 05/02/2023    MCH 30.4 05/02/2023    MCHC 34.6 05/02/2023    RDW 14.6 05/02/2023    PLT 89 (L) 05/02/2023    MPV 10.6 (H) 05/02/2023        Diagnostic Results:  X-Ray Chest PA And Lateral    Result Date: 6/10/2022  EXAMINATION:  XR CHEST PA AND LATERAL    CLINICAL HISTORY:  Other chest pain;    TECHNIQUE:  PA and lateral views of the chest.    COMPARISON:  9 August 2019    FINDINGS:  Lines/tubes/devices: none present    The cardiomediastinal silhouette and central pulmonary vasculature are unremarkable contour and size.  The trachea is midline. There is no lobar consolidation, pleural effusion, or pneumothorax.    There is no acute osseous or extrathoracic abnormality.          X-Ray Lumbar Spine Ap And Lateral    Result Date: 6/10/2022  EXAMINATION:  XR LUMBAR SPINE AP AND LATERAL    CLINICAL HISTORY:  lower back pain;    COMPARISON:  None.    FINDINGS:  There are 5 non-rib-bearing lumbar type vertebral bodies.  There is minimal anterolisthesis of L4 over L5.  The vertebral body heights are maintained.  There is mild disc height loss at L4-5 with small multilevel marginal osteophytes.  There is moderate facet arthropathy in the lower lumbar spine.  The soft tissues are unremarkable.         No results found in the last 24 hours.     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  Recent Relapse of Alcoholism  Alcohol Withdrawal  Thrombocytopenia  - Current MELD-Na 6; Child Jean Baptiste Class A (updated 11/6/23)  - INR 1.0 08/25/2023  - Last drink yesterday night, one 25 ounce beer; denying any current or history of seizures, diarrhea, visual or auditory hallucinations, insomnia, or palpitations  - Continues to endorses daily 1-2 beer consumption, plans to completely stop on his birthday [birth year]   - Continue Naltrexone 50 mg daily   - Instructed patient to return to AA meetings to obtain a sponsor (refused)   -instructed patient to call insurance card number  and receive possible therapist/psychologist and his living area that is septic his insurance  -referred to Dr. Lowe psychiatry for further support/management of his undergoing alcoholism  - Continue folic acid 1 mg once daily  - Varices Surveillance: EGD 11/28/22 unremarkable for varices, next due 11/28/2024  - HCC Screening:  U/S FibroScan 05/08/2023 -  fibrosis:  F 0-1; steatosis:  S3    Chronic Rhinitis (resolved)  - Continue Flonase daily    Anemia of Chronic Disease  - H/H stable, will continue annual CBCs    Helicobacter Pylori Infection (Treated; confirmed eradication)  - Currently stable, denies any ongoing symptoms  - Completed Pylera; H. Pylori Fecal Antigen confirmed eradication 05/09/2023; Stomach, Antrum biopsy 11/28/22 revealing chronic active gastritis and numerous helicobacter-like organisms    HTN  - /87 today  - Continue Coreg 6.25 mg BID and nifedipine 30 mg XR daily  - Avoid  ARB/ACEi given concerns of liver cirrhosis    Vitamin D Deficiency  - Vitamin D 48.5 08/25/2023, within normal range  - stopped Vit D 1000 units daily    Health Maintenance/ Wellness  Pneumococcal vaccination: Completed; Prevnar 20 (12/12/2022)  TDAP: UTD, next due 2/8/2033  Influenza vaccine: due next flu season  Varicella Zoster vaccination: Completed; 6/24/22, 9/22/22  Varices Screening (EGD): Completed for 11/28/22 by Dr. Green; Unremarkable for varices; H. Pylori positive biopsy on lesion in antrum; treated, eradicated; next due 11/28/2024  Lung Cancer Screening: not indicated, nonsmoker  HIV/Hep screening: nonreactive 11/27/20  Colon Cancer Screening: Cologuard neg 12/23/22; next due 12/23/25  COVID-19: Pfizer 6/17/21, 5/28/21; Patient refusing COVID boosters at this time  Last A1c 4.9 8/25/23    Counseling:  - Patient instructed to limit alcohol use  - Patient counselled on smoking cessation  - Educated on diet (portion control) and exercise (at least 30 minutes per day)  - Relevant educational materials  provided      RTC in 3 months    Natan Singh MD  Internal Medicine - PGY-2

## 2023-11-20 ENCOUNTER — HOSPITAL ENCOUNTER (OUTPATIENT)
Dept: RADIOLOGY | Facility: HOSPITAL | Age: 58
Discharge: HOME | End: 2023-11-20
Attending: STUDENT IN AN ORGANIZED HEALTH CARE EDUCATION/TRAINING PROGRAM
Payer: MEDICAID

## 2023-11-20 DIAGNOSIS — K70.30 ALCOHOLIC CIRRHOSIS OF LIVER WITHOUT ASCITES: ICD-10-CM

## 2023-11-20 PROCEDURE — 76705 ECHO EXAM OF ABDOMEN: CPT | Mod: TC

## 2023-12-12 DIAGNOSIS — E78.5 HYPERLIPIDEMIA, UNSPECIFIED HYPERLIPIDEMIA TYPE: Primary | ICD-10-CM

## 2023-12-12 RX ORDER — ATORVASTATIN CALCIUM 40 MG/1
40 TABLET, FILM COATED ORAL DAILY
Qty: 90 TABLET | Refills: 3 | Status: SHIPPED | OUTPATIENT
Start: 2023-12-12 | End: 2024-03-06 | Stop reason: SDUPTHER

## 2023-12-13 ENCOUNTER — TELEPHONE (OUTPATIENT)
Dept: INTERNAL MEDICINE | Facility: CLINIC | Age: 58
End: 2023-12-13
Payer: MEDICAID

## 2023-12-13 NOTE — TELEPHONE ENCOUNTER
----- Message from Natan Singh MD sent at 12/12/2023  3:12 PM CST -----  Reviewed patient's lipid panel, >12.5% chance of 10 yr thromboembolic event, started on Lipitor 40 mg daily.  Attempted to call patient but, patient's number is no longer working? Disconnected?    Just FYI, not sure what yall can do.    Thank you,  Natan Singh MD  [facility] Internal Medicine, hospitals      ----- Message -----  From: Lab, Background User  Sent: 11/20/2023  10:02 AM CST  To: Natan Singh MD

## 2023-12-14 NOTE — TELEPHONE ENCOUNTER
----- Message from Natan Singh MD sent at 12/12/2023  3:12 PM CST -----  Reviewed patient's lipid panel, >12.5% chance of 10 yr thromboembolic event, started on Lipitor 40 mg daily.  Attempted to call patient but, patient's number is no longer working? Disconnected?    Just FYI, not sure what yall can do.    Thank you,  Natan Singh MD  [facility] Internal Medicine, [facility]      ----- Message -----  From: Lab, Background User  Sent: 11/20/2023  10:02 AM CST  To: Natan Singh MD

## 2023-12-14 NOTE — TELEPHONE ENCOUNTER
Called patient and date of birth verified. Notified patient of new order for lipitor 40 mg daily. Patient verbalized thanks.

## 2024-02-23 ENCOUNTER — OFFICE VISIT (OUTPATIENT)
Dept: BEHAVIORAL HEALTH | Facility: CLINIC | Age: 59
End: 2024-02-23
Payer: MEDICAID

## 2024-02-23 VITALS
DIASTOLIC BLOOD PRESSURE: 88 MMHG | SYSTOLIC BLOOD PRESSURE: 140 MMHG | TEMPERATURE: 98 F | BODY MASS INDEX: 31.62 KG/M2 | OXYGEN SATURATION: 98 % | WEIGHT: 214.13 LBS | HEART RATE: 91 BPM

## 2024-02-23 DIAGNOSIS — F10.10 ALCOHOL ABUSE: ICD-10-CM

## 2024-02-23 DIAGNOSIS — F10.20 ALCOHOL USE DISORDER, SEVERE, DEPENDENCE: Primary | ICD-10-CM

## 2024-02-23 PROCEDURE — 1159F MED LIST DOCD IN RCRD: CPT | Mod: CPTII,,, | Performed by: STUDENT IN AN ORGANIZED HEALTH CARE EDUCATION/TRAINING PROGRAM

## 2024-02-23 PROCEDURE — 3077F SYST BP >= 140 MM HG: CPT | Mod: CPTII,,, | Performed by: STUDENT IN AN ORGANIZED HEALTH CARE EDUCATION/TRAINING PROGRAM

## 2024-02-23 PROCEDURE — 3008F BODY MASS INDEX DOCD: CPT | Mod: CPTII,,, | Performed by: STUDENT IN AN ORGANIZED HEALTH CARE EDUCATION/TRAINING PROGRAM

## 2024-02-23 PROCEDURE — 1160F RVW MEDS BY RX/DR IN RCRD: CPT | Mod: CPTII,,, | Performed by: STUDENT IN AN ORGANIZED HEALTH CARE EDUCATION/TRAINING PROGRAM

## 2024-02-23 PROCEDURE — 99214 OFFICE O/P EST MOD 30 MIN: CPT | Mod: PBBFAC,PN | Performed by: STUDENT IN AN ORGANIZED HEALTH CARE EDUCATION/TRAINING PROGRAM

## 2024-02-23 PROCEDURE — 3079F DIAST BP 80-89 MM HG: CPT | Mod: CPTII,,, | Performed by: STUDENT IN AN ORGANIZED HEALTH CARE EDUCATION/TRAINING PROGRAM

## 2024-02-23 PROCEDURE — 99205 OFFICE O/P NEW HI 60 MIN: CPT | Mod: AF,HB,S$PBB, | Performed by: STUDENT IN AN ORGANIZED HEALTH CARE EDUCATION/TRAINING PROGRAM

## 2024-02-23 RX ORDER — ACAMPROSATE CALCIUM 333 MG/1
333 TABLET, DELAYED RELEASE ORAL 3 TIMES DAILY
Qty: 90 TABLET | Refills: 5 | Status: SHIPPED | OUTPATIENT
Start: 2024-02-23 | End: 2024-04-23 | Stop reason: DRUGHIGH

## 2024-02-23 RX ORDER — ACAMPROSATE CALCIUM 333 MG/1
666 TABLET, DELAYED RELEASE ORAL 3 TIMES DAILY
Qty: 90 TABLET | Refills: 5 | Status: SHIPPED | OUTPATIENT
Start: 2024-02-23 | End: 2024-02-23

## 2024-02-23 NOTE — PROGRESS NOTES
"Outpatient Psychiatry Initial Visit    2/23/2024    Ger Denton, a 58 y.o. male, presenting for initial evaluation visit. Met with patient.    Reason for Encounter:   Referred from: Natan Singh MD  Reason for referral: "Alcohol abuse "  Chief complaint: "I want to stop drinking"    History of Present Illness:   Pt is a 57yo M w/ PPHx of alcohol abuse  who presents to psychiatry clinic for evaluation.      Pt denies history of psychiatric evaluation or diagnosis.  Working with primary care doctors on his drinking habit.  Notes started drinking in his 20s, would mostly drink with friends.  Has been drinking daily since starting.  At most was drinking 1 pint vodka, was drinking this way for "years."   Stopped drinking liquor "because it knocked me down."  Now drinking 2-3 40oz beer daily, drinks slowly "all day long."  Notes drinking despite "it don't do me nothing."  Notes benefit for worrying and sleep when drinking.  Denies history of withdrawal seizures.  Questionable episode of DTs.  Denies history of rehab/detox.  Denies benefit from current naltexone, notes history of taking disulfuram (unclear response).  Has cirrhosis, attributed to drinking (contribution to pt's decision to quit liquor).  Notes prominent difficulty with memory, pt has difficulty describing further.  Notes that he lives with his sister, who helps with managing money, cooking.  Denies difficulty with hygiene.  Notes 2-3 hospitalizations for issues related to drinking.   Denies prior involvement in mutual support groups.  Denies legal issues related to drinking, denies relationship problems related to drinking.  Organ transplantation and potential death discussed with patient, pt continues to drink despite this.  Wants to cut down on his drinking, but not interested in stopping entirely.   Last drink yesterday night.      Regarding depression, denies history of depressed mood.  Denies history of suicidal thinking or behavior.  Appetite " "low, sleep poor, energy low, motivation low, denies hobbies/interests.      Denies history of episodes concerning for eleuterio/hypomania.      Endorses history of hallucinations or other altered perceptions (when drinking liquor), denies paranoid ideation.      Endorses excess worry/anxiety.  Endorses growing up with excessive anxiety.  Worries are about wide variety of topics.  Notes associated symptoms: denies rumination, + sleep difficulty, + concentration, + irritability, denies tension or feeling "on edge," denies muscle tension, denies HA, denies GI upset.     Meds Hx (has pt taken the following):   SSRIs: denies  SNRIs: denies  TCAs: denies  Atypical ADs: denies  Anxiolytics: denies  Neuroleptics: denies  Mood stabilizers: denies  Stimulants: denies  Other: denies    History:     Allergies:  Patient has no known allergies.    Past Medical/Surgical History:  Past Medical History:   Diagnosis Date    Cirrhosis     Hypertension      Past Surgical History:   Procedure Laterality Date    ESOPHAGOGASTRODUODENOSCOPY      none         Medications  Outpatient Encounter Medications as of 2/23/2024   Medication Sig Dispense Refill    acamprosate (CAMPRAL) 333 mg tablet Take 1 tablet (333 mg total) by mouth 3 (three) times daily. 90 tablet 5    atorvastatin (LIPITOR) 40 MG tablet Take 1 tablet (40 mg total) by mouth once daily. 90 tablet 3    carvediloL (COREG) 6.25 MG tablet Take 1 tablet (6.25 mg total) by mouth 2 (two) times daily with meals. 180 tablet 3    fluticasone propionate (FLONASE) 50 mcg/actuation nasal spray 1 spray (50 mcg total) by Each Nostril route once daily. 18.2 mL 11    folic acid (FOLVITE) 1 MG tablet Take 1 tablet (1 mg total) by mouth once daily. 90 tablet 3    loratadine (CLARITIN) 10 mg tablet Take 1 tablet (10 mg total) by mouth once daily. 90 tablet 3    NIFEdipine (PROCARDIA-XL) 30 MG (OSM) 24 hr tablet Take 1 tablet (30 mg total) by mouth once daily. 90 tablet 3    pantoprazole (PROTONIX) 40 " "MG tablet Take 1 tablet (40 mg total) by mouth once daily. 30 min before breakfast 90 tablet 3    [DISCONTINUED] acamprosate (CAMPRAL) 333 mg tablet Take 2 tablets (666 mg total) by mouth 3 (three) times daily. 90 tablet 5    [DISCONTINUED] naltrexone (DEPADE) 50 mg tablet Take 50 mg by mouth once daily. 90 tablet 3     No facility-administered encounter medications on file as of 2/23/2024.     Past Psychiatric History:  Previous Medication Trials: See above   Previous Psychiatric Hospitalizations: denies   Previous Suicide Attempts: denies   History of Violence: denies  Outpatient mental health: denies  Family History: denies    Social History:  Marital Status: not in dating relationship  Children: 1   Employment Status/Info: not working currently, last worked "years ago"  Education: 9th grade, no GED  Housing Status: lives in house with sister, niece and nephew  History of phys/sexual abuse: denies  Access to gun: denies    Substance Abuse History:  Tobacco Use: denies  Use of Alcohol: see above  Recreational Drugs: denies  Rehab/detox: denies    Legal History:  Past Charges/Incarcerations: denies   Pending charges: denie     Psychosocial Stressors: family and health    Review Of Systems:     Constitutional: denies fevers, denies chills, denies recent weight change  Eyes: denies pain in eyes or loss of vision  Ears: denies tinnitis, denies loss of hearing  Mouth/throat: denies difficulty with speaking, denies difficulty with swallowing  Cardiac: denies CP, denies palpitations  Respiratory: denies SOB, denies cough  Gastrointestinal: denies abdominal pain, denies nausea/vomiting, denies constipation/diarrhea  Genitourinary: denies urinary frequency, denies burning on urination  Dermatologic: denies rash, denies erythema  Musculoskeletal: denies myalgias, denies arthralgias  Hematologic: denies easy bleeding/bruising, denies enlarged lymph nodes  Neurologic: denies seizures, denies headaches, denies loss of " "sensation, denies weakness  Psychiatric: see HPI    Current Evaluation:     Nutritional Screening: Considering the patient's height and weight, medications, medical history and preferences, should a referral be made to the dietitian? no    Constitutional  Vitals:  Most recent vital signs, dated less than 90 days prior to this appointment, were reviewed.      Vitals:    02/23/24 0949 02/23/24 0950   BP: (!) 143/97 (!) 140/88   Pulse: 91 91   Temp: 97.6 °F (36.4 °C)    SpO2: 98%    Weight: 97.1 kg (214 lb 1.6 oz)         General:  No acute distress     Neurologic:   Motor: moves all extremities spontaneously and without difficulty  Gait: normal gait and station    Mental status examination:  Appearance: unremarkable, age appropriate  Level of Consciousness: awake and alert  Behavior/Cooperation: calm and cooperative  Psychomotor: unremarkable  Speech: normal tone, normal rate, normal pitch, normal volume  Language: english, fluid  Memory: Registers 3/3 objects, recalls 2/3 objects at 5 minutes without cuing, recalls 3/3 objects at 5 minutes with cuing  Orientation: grossly intact, person, place, situation, day of week, month of year, year  Mood: "confused"  Affect: mood congruent and euthymic  Attention Span/Concentration: pt unable to spell, recalls frequent repetition of questions due to lack of understanding  Thought Process: linear, goal-directed  Thought Content: denies SI/HI/paranoia, no delusional ideation volunteered, denies plan or desire for self harm or harm to others  Perceptions: denies hallucinations or other altered perceptions  Associations: Logical and appropriate  Fund of Knowledge: appropriate for education  Abstraction: similarities were abstract  Insight: fair  Judgment: good    Relevant Elements of Neurological Exam: no abnormal involuntary movements observed    Functioning in Relationships:  Spouse/partner: not in dating relationship  Peers: socially isolated  Employers: not working " currently    Assessments:   PHQ9:  Over the last two weeks how often have you been bothered by little interest or pleasure in doing things: 2  Over the last two weeks how often have you been bothered by feeling down, depressed or hopeless: 3  PHQ-2 Total Score: 5  PHQ-9 Score: 16  PHQ-9 Interpretation: Moderately Severe    GAD7:      2/23/2024     9:46 AM   GAD7   1. Feeling nervous, anxious, or on edge? 2   2. Not being able to stop or control worrying? 2   3. Worrying too much about different things? 2   4. Trouble relaxing? 2   5. Being so restless that it is hard to sit still? 2   6. Becoming easily annoyed or irritable? 0   7. Feeling afraid as if something awful might happen? 2   8. If you checked off any problems, how difficult have these problems made it for you to do your work, take care of things at home, or get along with other people? 2   CAMPBELL-7 Score 12     Laboratory Data  No visits with results within 1 Month(s) from this visit.   Latest known visit with results is:   Lab Visit on 11/20/2023   Component Date Value Ref Range Status    Vit D 25 OH 11/20/2023 47.9  30.0 - 80.0 ng/mL Final    Cholesterol Total 11/20/2023 206 (H)  <=200 mg/dL Final    HDL Cholesterol 11/20/2023 58  35 - 60 mg/dL Final    Triglyceride 11/20/2023 77  34 - 140 mg/dL Final    Cholesterol/HDL Ratio 11/20/2023 4  0 - 5 Final    Very Low Density Lipoprotein 11/20/2023 15   Final    LDL Cholesterol 11/20/2023 133.00  50.00 - 140.00 mg/dL Final       Assessment - Diagnosis - Goals:     Ger Denton, a 58 y.o. male, presenting for initial evaluation visit.     Impression:       ICD-10-CM ICD-9-CM   1. Alcohol use disorder, severe, dependence  F10.20 303.90   2. Alcohol abuse  F10.10 305.00     Strengths and Liabilities: Strength: Patient accepts guidance/feedback, Strength: Patient is intelligent., Liability: Patient has possible cognitive impairment.    Treatment Goals:  Specify outcomes written in observable, behavioral terms:    Drug & Alcohol: decreased alcohol consumption with goal of quitting, eliminate cravings    Treatment Plan/Recommendations:   Stop naltrexone as pt has cirrhosis (naltrexone metabolized through the liver)  Start acamprosate 333mg tid, consider uptitration to 666mg tid at next visit, discussed potential SE including but not limited to GI upset, headaches  Recommended mutual support groups (AA)  Discussed setting quit date (pt unable to understand this concept)  Recommended gradual de-escalation of drinking rather than sudden discontinuation due to risk of withdrawal  Strongly recommend IP rehab, will investigate resources available to patient  Recent labwork in EMR reviewed  Ordered CBC, CMP, UDS and PETH  No need for PEC as pt is not an imminent danger to self or others or gravely disabled due to acute psychiatric illness  Discussed that pt should either call clinic for psychiatric crisis symptoms or present to nearest emergency room    Discussed with patient informed consent including diagnosis, risks and benefits of proposed treatment above vs. alternative treatments vs. no treatment, as well as serious and common side effects of these treatments, and the inherent unpredictability of individual responses to these treatments. The patient expresses understanding of the above and displays the capacity to agree with this current plan. Patient also agrees that, currently, the benefits outweigh the risks and would like to pursue treatment at this time, and had no other questions.    Instructions:  Take all medications as prescribed.    Abstain from recreational drugs and alcohol.  Present to ED or call 911 for SI/HI plan or intent, psychosis, or medical emergency.    Return to Clinic: Follow up in about 4 weeks (around 3/22/2024).    Total time:   Complexity (level) of medical decision making employed in the encounter: MODERATE    The total time for services performed on the date of the encounter (including review of  prior visit notes, review of notes from other providers, review of results from laboratory/imaging studies, face-to-face time with patient, and time spent on other activities directly related to patient care): 60 minutes.    Natan Lowe MD  [facility]

## 2024-03-01 ENCOUNTER — LAB VISIT (OUTPATIENT)
Dept: LAB | Facility: HOSPITAL | Age: 59
End: 2024-03-01
Attending: STUDENT IN AN ORGANIZED HEALTH CARE EDUCATION/TRAINING PROGRAM
Payer: MEDICAID

## 2024-03-01 DIAGNOSIS — F10.20 ALCOHOL USE DISORDER, SEVERE, DEPENDENCE: ICD-10-CM

## 2024-03-01 LAB
ALBUMIN SERPL-MCNC: 3.8 G/DL (ref 3.5–5)
ALBUMIN/GLOB SERPL: 0.9 RATIO (ref 1.1–2)
ALP SERPL-CCNC: 62 UNIT/L (ref 40–150)
ALT SERPL-CCNC: 19 UNIT/L (ref 0–55)
AMPHET UR QL SCN: NEGATIVE
AST SERPL-CCNC: 38 UNIT/L (ref 5–34)
BARBITURATE SCN PRESENT UR: NEGATIVE
BENZODIAZ UR QL SCN: NEGATIVE
BILIRUB SERPL-MCNC: 0.4 MG/DL
BUN SERPL-MCNC: 17.5 MG/DL (ref 8.4–25.7)
CALCIUM SERPL-MCNC: 9.2 MG/DL (ref 8.4–10.2)
CANNABINOIDS UR QL SCN: POSITIVE
CHLORIDE SERPL-SCNC: 100 MMOL/L (ref 98–107)
CO2 SERPL-SCNC: 28 MMOL/L (ref 22–29)
COCAINE UR QL SCN: NEGATIVE
CREAT SERPL-MCNC: 1.2 MG/DL (ref 0.73–1.18)
ERYTHROCYTE [DISTWIDTH] IN BLOOD BY AUTOMATED COUNT: 15 % (ref 11.5–17)
FENTANYL UR QL SCN: NEGATIVE
GFR SERPLBLD CREATININE-BSD FMLA CKD-EPI: >60 MLS/MIN/1.73/M2
GLOBULIN SER-MCNC: 4.4 GM/DL (ref 2.4–3.5)
GLUCOSE SERPL-MCNC: 99 MG/DL (ref 74–100)
HCT VFR BLD AUTO: 39 % (ref 42–52)
HGB BLD-MCNC: 13 G/DL (ref 14–18)
MCH RBC QN AUTO: 29.2 PG (ref 27–31)
MCHC RBC AUTO-ENTMCNC: 33.3 G/DL (ref 33–36)
MCV RBC AUTO: 87.6 FL (ref 80–94)
MDMA UR QL SCN: NEGATIVE
NRBC BLD AUTO-RTO: 0 %
OPIATES UR QL SCN: NEGATIVE
PCP UR QL: NEGATIVE
PH UR: 5.5 [PH] (ref 3–11)
PLATELET # BLD AUTO: 170 X10(3)/MCL (ref 130–400)
PMV BLD AUTO: 9.9 FL (ref 7.4–10.4)
POTASSIUM SERPL-SCNC: 4.5 MMOL/L (ref 3.5–5.1)
PROT SERPL-MCNC: 8.2 GM/DL (ref 6.4–8.3)
RBC # BLD AUTO: 4.45 X10(6)/MCL (ref 4.7–6.1)
SODIUM SERPL-SCNC: 137 MMOL/L (ref 136–145)
SPECIFIC GRAVITY, URINE AUTO (.000) (OHS): 1.02 (ref 1–1.03)
WBC # SPEC AUTO: 4.02 X10(3)/MCL (ref 4.5–11.5)

## 2024-03-01 PROCEDURE — 36415 COLL VENOUS BLD VENIPUNCTURE: CPT

## 2024-03-01 PROCEDURE — 80321 ALCOHOLS BIOMARKERS 1OR 2: CPT

## 2024-03-01 PROCEDURE — 85027 COMPLETE CBC AUTOMATED: CPT

## 2024-03-01 PROCEDURE — 80053 COMPREHEN METABOLIC PANEL: CPT

## 2024-03-01 PROCEDURE — 80307 DRUG TEST PRSMV CHEM ANLYZR: CPT

## 2024-03-05 LAB
CLINICAL BIOCHEMIST REVIEW: NORMAL
PLPETH BLD-MCNC: 762 NG/ML
POPETH BLD-MCNC: 972 NG/ML

## 2024-03-06 ENCOUNTER — OFFICE VISIT (OUTPATIENT)
Dept: INTERNAL MEDICINE | Facility: CLINIC | Age: 59
End: 2024-03-06
Payer: MEDICAID

## 2024-03-06 VITALS
HEART RATE: 70 BPM | HEIGHT: 69 IN | WEIGHT: 214 LBS | SYSTOLIC BLOOD PRESSURE: 130 MMHG | DIASTOLIC BLOOD PRESSURE: 79 MMHG | TEMPERATURE: 98 F | BODY MASS INDEX: 31.7 KG/M2 | OXYGEN SATURATION: 100 % | RESPIRATION RATE: 20 BRPM

## 2024-03-06 DIAGNOSIS — J31.0 RHINITIS, UNSPECIFIED TYPE: ICD-10-CM

## 2024-03-06 DIAGNOSIS — D64.9 NORMOCYTIC ANEMIA: ICD-10-CM

## 2024-03-06 DIAGNOSIS — K21.9 GASTROESOPHAGEAL REFLUX DISEASE, UNSPECIFIED WHETHER ESOPHAGITIS PRESENT: ICD-10-CM

## 2024-03-06 DIAGNOSIS — A04.8 H. PYLORI INFECTION: ICD-10-CM

## 2024-03-06 DIAGNOSIS — R33.9 URINARY RETENTION WITH INCOMPLETE BLADDER EMPTYING: ICD-10-CM

## 2024-03-06 DIAGNOSIS — I10 HYPERTENSION, UNSPECIFIED TYPE: ICD-10-CM

## 2024-03-06 DIAGNOSIS — K76.0 HEPATIC STEATOSIS: ICD-10-CM

## 2024-03-06 DIAGNOSIS — J32.9 CHRONIC SINUSITIS, UNSPECIFIED LOCATION: ICD-10-CM

## 2024-03-06 DIAGNOSIS — F10.939 ALCOHOL WITHDRAWAL SYNDROME WITH COMPLICATION: ICD-10-CM

## 2024-03-06 DIAGNOSIS — E55.9 VITAMIN D DEFICIENCY: ICD-10-CM

## 2024-03-06 DIAGNOSIS — J31.0 CHRONIC RHINITIS: ICD-10-CM

## 2024-03-06 DIAGNOSIS — F10.10 ALCOHOL ABUSE: ICD-10-CM

## 2024-03-06 DIAGNOSIS — E78.5 HYPERLIPIDEMIA, UNSPECIFIED HYPERLIPIDEMIA TYPE: Primary | ICD-10-CM

## 2024-03-06 LAB — HBA1C MFR BLD: 4.8 %

## 2024-03-06 PROCEDURE — 99213 OFFICE O/P EST LOW 20 MIN: CPT | Mod: PBBFAC

## 2024-03-06 PROCEDURE — 83036 HEMOGLOBIN GLYCOSYLATED A1C: CPT | Mod: PBBFAC

## 2024-03-06 RX ORDER — AMPICILLIN TRIHYDRATE 500 MG
1000 CAPSULE ORAL DAILY
Qty: 90 CAPSULE | Refills: 0 | Status: SHIPPED | OUTPATIENT
Start: 2024-03-06

## 2024-03-06 RX ORDER — NIFEDIPINE 30 MG/1
30 TABLET, EXTENDED RELEASE ORAL DAILY
Qty: 90 TABLET | Refills: 3 | Status: SHIPPED | OUTPATIENT
Start: 2024-03-06 | End: 2025-03-06

## 2024-03-06 RX ORDER — FLUTICASONE PROPIONATE 50 MCG
1 SPRAY, SUSPENSION (ML) NASAL DAILY
Qty: 16 G | Refills: 11 | Status: SHIPPED | OUTPATIENT
Start: 2024-03-06

## 2024-03-06 RX ORDER — TAMSULOSIN HYDROCHLORIDE 0.4 MG/1
0.4 CAPSULE ORAL DAILY
Qty: 90 CAPSULE | Refills: 3 | Status: SHIPPED | OUTPATIENT
Start: 2024-03-06 | End: 2025-03-06

## 2024-03-06 RX ORDER — CARVEDILOL 6.25 MG/1
6.25 TABLET ORAL 2 TIMES DAILY WITH MEALS
Qty: 180 TABLET | Refills: 3 | Status: SHIPPED | OUTPATIENT
Start: 2024-03-06 | End: 2025-03-06

## 2024-03-06 RX ORDER — LORATADINE 10 MG/1
10 TABLET ORAL DAILY
Qty: 90 TABLET | Refills: 3 | Status: SHIPPED | OUTPATIENT
Start: 2024-03-06 | End: 2025-03-06

## 2024-03-06 RX ORDER — PANTOPRAZOLE SODIUM 40 MG/1
40 TABLET, DELAYED RELEASE ORAL DAILY
Qty: 90 TABLET | Refills: 3 | Status: SHIPPED | OUTPATIENT
Start: 2024-03-06 | End: 2025-03-06

## 2024-03-06 RX ORDER — FOLIC ACID 1 MG/1
1 TABLET ORAL DAILY
Qty: 90 TABLET | Refills: 3 | Status: SHIPPED | OUTPATIENT
Start: 2024-03-06 | End: 2025-03-01

## 2024-03-06 RX ORDER — AMPICILLIN TRIHYDRATE 500 MG
1000 CAPSULE ORAL DAILY
COMMUNITY
Start: 2023-12-19 | End: 2024-03-06 | Stop reason: SDUPTHER

## 2024-03-06 RX ORDER — ATORVASTATIN CALCIUM 40 MG/1
40 TABLET, FILM COATED ORAL DAILY
Qty: 90 TABLET | Refills: 3 | Status: SHIPPED | OUTPATIENT
Start: 2024-03-06 | End: 2025-03-06

## 2024-03-06 NOTE — PROGRESS NOTES
"  INTERNAL MEDICINE RESIDENT CLINIC  CLINIC NOTE    Patient Name: Ger Denton  YOB: 1965  Chief Complaint: Hypertension and Back Pain (Patient c/o lower back pain after standing too long.)     PRESENTING HISTORY   History of Present Illness:  Mr. Ger Denton is a 58 y.o. male w/ PMHx of chronic alcoholism, hepatosteatosis, hypertension, and history of vitamin D deficiency who presents to internal medicine clinic for follow up.     Has been by seen by Dr. Lowe (Psychiatry) for his alcohol use disorder. At visit, Naltrexone stopped due to lack of benefit and switch to Acamprosate 333 mg TID. Endorsing benefit as far as craving reduction, but still is unsure.     Last visit:  Patient continues to endorses 1-2 beer consumption daily (25 ounce beers). He plans on complete cessation after his birthday on [birth year]. Patient additionally has not picked up his Nifedipine which was started last visit.      Review of Systems:  12 point review of symptoms negative unless otherwise stated above    PAST HISTORY:     Past Medical History:   Diagnosis Date    Cirrhosis     Hypertension         Past Surgical History:   Procedure Laterality Date    ESOPHAGOGASTRODUODENOSCOPY      none         Family History   Problem Relation Age of Onset    Cancer Sister        Social History     Socioeconomic History    Marital status: Single   Tobacco Use    Smoking status: Never     Passive exposure: Past    Smokeless tobacco: Never   Substance and Sexual Activity    Alcohol use: Yes     Alcohol/week: 42.0 standard drinks of alcohol     Types: 42 Cans of beer per week     Comment: 1-2 40oz beer/d    Drug use: Not Currently     Types: Marijuana     Comment: states "every now and then"    Sexual activity: Not Currently     Social Determinants of Health     Financial Resource Strain: Low Risk  (11/15/2023)    Overall Financial Resource Strain (CARDIA)     Difficulty of Paying Living Expenses: Not hard at all   Food Insecurity: No " Food Insecurity (11/15/2023)    Hunger Vital Sign     Worried About Running Out of Food in the Last Year: Never true     Ran Out of Food in the Last Year: Never true   Transportation Needs: No Transportation Needs (11/15/2023)    PRAPARE - Transportation     Lack of Transportation (Medical): No     Lack of Transportation (Non-Medical): No   Physical Activity: Inactive (11/15/2023)    Exercise Vital Sign     Days of Exercise per Week: 0 days     Minutes of Exercise per Session: 0 min   Stress: No Stress Concern Present (11/15/2023)    [employer] - Occupational Stress Questionnaire     Feeling of Stress : Not at all   Social Connections: Moderately Isolated (11/15/2023)    Social Connection and Isolation Panel [NHANES]     Frequency of Communication with Friends and Family: Once a week     Frequency of Social Gatherings with Friends and Family: More than three times a week     Attends Mandaen Services: 1 to 4 times per year     Active Member of Clubs or Organizations: No     Attends Club or Organization Meetings: Never     Marital Status: Never    Housing Stability: Low Risk  (11/15/2023)    Housing Stability Vital Sign     Unable to Pay for Housing in the Last Year: No     Number of Places Lived in the Last Year: 1     Unstable Housing in the Last Year: No       MEDICATIONS:     Current Outpatient Medications   Medication Instructions    acamprosate (CAMPRAL) 333 mg, Oral, 3 times daily    atorvastatin (LIPITOR) 40 mg, Oral, Daily    carvediloL (COREG) 6.25 mg, Oral, 2 times daily with meals    fluticasone propionate (FLONASE) 50 mcg, Each Nostril, Daily    folic acid (FOLVITE) 1 mg, Oral, Daily    loratadine (CLARITIN) 10 mg, Oral, Daily    NIFEdipine (PROCARDIA-XL) 30 mg, Oral, Daily    pantoprazole (PROTONIX) 40 mg, Oral, Daily, 30 min before breakfast    VITAMIN D3 1,000 Units, Oral, Daily        OBJECTIVE:   Vital Signs:  Vitals:    03/06/24 0751   BP: 130/79   Pulse: 70  "  Resp: 20   Temp: 98.4 °F (36.9 °C)   TempSrc: Oral   SpO2: 100%   Weight: 97.1 kg (214 lb)   Height: 5' 9" (1.753 m)                Physical Exam:  Physical Exam  Vitals and nursing note reviewed.   Constitutional:       General: He is not in acute distress.     Appearance: Normal appearance. He is not ill-appearing, toxic-appearing or diaphoretic.      Comments: Non tremulous, calm demeanor.   HENT:      Head: Normocephalic.   Eyes:      General: No scleral icterus.     Extraocular Movements: Extraocular movements intact.      Pupils: Pupils are equal, round, and reactive to light.   Cardiovascular:      Rate and Rhythm: Normal rate and regular rhythm.      Pulses: Normal pulses.      Heart sounds: Normal heart sounds. No murmur heard.  Pulmonary:      Effort: Pulmonary effort is normal. No respiratory distress.      Breath sounds: Normal breath sounds. No wheezing or rales.   Abdominal:      General: Abdomen is flat. There is no distension.      Palpations: Abdomen is soft.      Tenderness: There is no abdominal tenderness. There is no guarding.   Musculoskeletal:         General: No swelling or tenderness. Normal range of motion.      Cervical back: Normal range of motion.      Right lower leg: No edema.      Left lower leg: No edema.   Skin:     General: Skin is warm.      Capillary Refill: Capillary refill takes less than 2 seconds.      Coloration: Skin is not jaundiced or pale.      Findings: No lesion or rash.   Neurological:      General: No focal deficit present.      Mental Status: He is alert and oriented to person, place, and time. Mental status is at baseline.           Laboratory  Lab Results   Component Value Date     03/01/2024    K 4.5 03/01/2024    CO2 28 03/01/2024    BUN 17.5 03/01/2024    CREATININE 1.20 (H) 03/01/2024    CALCIUM 9.2 03/01/2024    BILIDIR 0.3 07/29/2021    IBILI 0.30 07/29/2021    ALKPHOS 62 03/01/2024    AST 38 (H) 03/01/2024    ALT 19 03/01/2024    MG 1.30 (L) " 06/10/2022    PHOS 3.5 02/22/2021        Lab Results   Component Value Date    WBC 4.02 (L) 03/01/2024    RBC 4.45 (L) 03/01/2024    HGB 13.0 (L) 03/01/2024    HCT 39.0 (L) 03/01/2024    MCV 87.6 03/01/2024    MCH 29.2 03/01/2024    MCHC 33.3 03/01/2024    RDW 15.0 03/01/2024     03/01/2024    MPV 9.9 03/01/2024        Diagnostic Results:  X-Ray Chest PA And Lateral    Result Date: 6/10/2022  EXAMINATION:  XR CHEST PA AND LATERAL    CLINICAL HISTORY:  Other chest pain;    TECHNIQUE:  PA and lateral views of the chest.    COMPARISON:  9 August 2019    FINDINGS:  Lines/tubes/devices: none present    The cardiomediastinal silhouette and central pulmonary vasculature are unremarkable contour and size.  The trachea is midline. There is no lobar consolidation, pleural effusion, or pneumothorax.    There is no acute osseous or extrathoracic abnormality.          X-Ray Lumbar Spine Ap And Lateral    Result Date: 6/10/2022  EXAMINATION:  XR LUMBAR SPINE AP AND LATERAL    CLINICAL HISTORY:  lower back pain;    COMPARISON:  None.    FINDINGS:  There are 5 non-rib-bearing lumbar type vertebral bodies.  There is minimal anterolisthesis of L4 over L5.  The vertebral body heights are maintained.  There is mild disc height loss at L4-5 with small multilevel marginal osteophytes.  There is moderate facet arthropathy in the lower lumbar spine.  The soft tissues are unremarkable.         No results found in the last 24 hours.     ASSESSMENT & PLAN:     Chronic Alcoholism  Recent Relapse of Alcoholism  Alcohol Withdrawal  Thrombocytopenia  - MELD-Na 6; Child Jean Baptiste Class A (updated 11/6/23); INR 1.0 08/25/2023  -Will update at 3 mo follow apt; labs orders placed  -Last drink 3/1/24 night; began taking Acamprosate (Campral) 333 mg TID roughly 3/3/24, endorsing benefit  -Now established with Dr. Lowe (Psych) on 2/23/24; naltrexone (lack of benefit) switched to Campral appointment   -UDS positive for cannabinoids;  -PETH 3/1/24  consistent for up to 4 drinks daily several days a week  - Continue folic acid 1 mg once daily  - Varices Surveillance: EGD 11/28/22 unremarkable for varices, next due 11/28/2024;   - HCC Screening:  U/S FibroScan 05/08/2023 -  fibrosis:  F 0-1; steatosis:  S3  -Following with Dr. José in [facility]osis [facility] here at [facility] as well, next apt 5/6/24    Hepatic Steatosis  HLD  Cholelithiasis  -Most recent HCC surveillance screening US Abd 11/20/23 consistent with hepatic steatosis w/o cirrhotic morphology, in addition to cholelithiasis w/o sonographic evidence of cholecystitis  -Patient unaware of compliance with atorvastatin; ASCVD risk 12.3% (per Lipid Panel 11/20/23)   -Refilled Lipitor 40 mg daily   -Plan for recheck Lipid panel in 3 mo    CIRRHOSIS COUNSELING:  - strict abstinence of alcohol use  - low sodium (salt) 2000mg per day  - Nutrition: 25-30kcal (calorie per body weight in kilogram) per day  - No need to restrict protein in diet  - High protein diet: 1.2-1.5 gram/kg (protein per body weight in kg) per day to prevent muscle mass loss  - Resistance exercises for muscle strength  - Avoid raw seafoods due to risk of fatal Vibrio vulnificus infection  - Avoid Non-steroidal anti-inflammatory drugs (NSAIDs) such as ibuprofen, Motrin, naproxen, Aleve due to risk of kidney damage  - Can take acetaminophen (tylenol), no more than 2000mg per day  - Compliance with all medications  - Ultrasound or MRI of the liver every 6 months for liver cancer screening  - Upper endoscopy every 1-2 years to screen for varices     Chronic Rhinitis  - Continue Flonase daily, refilled today    Anemia of Chronic Disease  - H/H stable, will update anemia workup    Hx of Helicobacter Pylori Infection (Treated; confirmed eradication)  - Currently stable, denies any ongoing symptoms  - Completed Pylera; H. Pylori Fecal Antigen confirmed eradication 05/09/2023; Stomach, Antrum biopsy 11/28/22 revealing chronic active gastritis and numerous  helicobacter-like organisms    HTN  - /87 today  - Continue Coreg 6.25 mg BID and nifedipine 30 mg XR daily  - Avoid  ARB/ACEi given concerns of liver cirrhosis    Vitamin D Deficiency  - Vitamin D 48.5 08/25/2023, within normal range  - restarting Vit D 1000 units daily (rx for 3 mo; recheck after)    Health Maintenance/ Wellness  Pneumococcal vaccination: Completed; Prevnar 20 (12/12/2022)  TDAP: UTD, next due 2/8/2033  Influenza vaccine: due next flu season  Varicella Zoster vaccination: Completed; 6/24/22, 9/22/22  Varices Screening (EGD): Completed for 11/28/22 by Dr. Green; Unremarkable for varices; H. Pylori positive biopsy on lesion in antrum; treated, eradicated; next due 11/28/2024  Lung Cancer Screening: not indicated, nonsmoker  HIV/Hep screening: nonreactive 11/27/20  Colon Cancer Screening: Cologuard neg 12/23/22; next due 12/23/25  COVID-19: Pfizer 6/17/21, 5/28/21; Patient refusing COVID boosters at this time  Last A1c 4.8 3/6/24    Counseling:  - Patient instructed to limit alcohol use  - Patient counselled on smoking cessation  - Educated on diet (portion control) and exercise (at least 30 minutes per day)  - Relevant educational materials provided      RTC in 3 months    Natan Singh MD  Internal Medicine - PGY-2

## 2024-04-23 ENCOUNTER — OFFICE VISIT (OUTPATIENT)
Dept: BEHAVIORAL HEALTH | Facility: CLINIC | Age: 59
End: 2024-04-23
Payer: MEDICAID

## 2024-04-23 VITALS
TEMPERATURE: 98 F | HEART RATE: 88 BPM | DIASTOLIC BLOOD PRESSURE: 90 MMHG | SYSTOLIC BLOOD PRESSURE: 142 MMHG | BODY MASS INDEX: 31.35 KG/M2 | WEIGHT: 212.31 LBS | OXYGEN SATURATION: 99 %

## 2024-04-23 DIAGNOSIS — F10.20 ALCOHOL USE DISORDER, SEVERE, DEPENDENCE: Primary | ICD-10-CM

## 2024-04-23 PROCEDURE — 1160F RVW MEDS BY RX/DR IN RCRD: CPT | Mod: CPTII,,, | Performed by: STUDENT IN AN ORGANIZED HEALTH CARE EDUCATION/TRAINING PROGRAM

## 2024-04-23 PROCEDURE — 99213 OFFICE O/P EST LOW 20 MIN: CPT | Mod: PBBFAC,PN | Performed by: STUDENT IN AN ORGANIZED HEALTH CARE EDUCATION/TRAINING PROGRAM

## 2024-04-23 PROCEDURE — 3044F HG A1C LEVEL LT 7.0%: CPT | Mod: CPTII,,, | Performed by: STUDENT IN AN ORGANIZED HEALTH CARE EDUCATION/TRAINING PROGRAM

## 2024-04-23 PROCEDURE — 1159F MED LIST DOCD IN RCRD: CPT | Mod: CPTII,,, | Performed by: STUDENT IN AN ORGANIZED HEALTH CARE EDUCATION/TRAINING PROGRAM

## 2024-04-23 PROCEDURE — 3008F BODY MASS INDEX DOCD: CPT | Mod: CPTII,,, | Performed by: STUDENT IN AN ORGANIZED HEALTH CARE EDUCATION/TRAINING PROGRAM

## 2024-04-23 PROCEDURE — 99213 OFFICE O/P EST LOW 20 MIN: CPT | Mod: AF,HB,S$PBB, | Performed by: STUDENT IN AN ORGANIZED HEALTH CARE EDUCATION/TRAINING PROGRAM

## 2024-04-23 PROCEDURE — 3077F SYST BP >= 140 MM HG: CPT | Mod: CPTII,,, | Performed by: STUDENT IN AN ORGANIZED HEALTH CARE EDUCATION/TRAINING PROGRAM

## 2024-04-23 PROCEDURE — 3080F DIAST BP >= 90 MM HG: CPT | Mod: CPTII,,, | Performed by: STUDENT IN AN ORGANIZED HEALTH CARE EDUCATION/TRAINING PROGRAM

## 2024-04-23 RX ORDER — ACAMPROSATE CALCIUM 333 MG/1
666 TABLET, DELAYED RELEASE ORAL 3 TIMES DAILY
Qty: 180 TABLET | Refills: 5 | Status: SHIPPED | OUTPATIENT
Start: 2024-04-23 | End: 2025-04-23

## 2024-04-23 NOTE — PROGRESS NOTES
"Outpatient Psychiatry Follow-Up Visit    4/23/2024    Clinical Status of Patient:  Outpatient (Ambulatory)    Chief Complaint:  Ger Denton is a 58 y.o. male who presents today for follow-up of substance problems. Patient last seen for initial evaluation on 2/23/2024. Met with patient.      Interval History and Content of Current Session:  Interim Events/Subjective Report/Content of Current Session:   Pt reports doing "ok"  overall.  Notes decreasing his alcohol consumption, drinking only on weekends, drinking 2-3 40oz beers.  Continues to have cravings, difficulty to avoid alcohol due to cravings.  Trying to adhere to acamprosate, denies conscious benefit.  Reports good mood, denies currently feeling depressed, fair anxiety.  Sleeping fair, difficulty with staying asleep.  Appetite stable, weight stable.  Energy "alright", motivation good.  Denies irritability, denies hopelessness.  Denies SI/HI/AVH/paranoia, denies plan or desire for self harm or harm to others.  Denies SE from current regimen Denies somatic complaints.   Pt voices desire to adjust regimen to address ongoing symptoms.      Psychiatric Review of Systems-is patient experiencing or having changes in  Integrated into HPI above.     Review of Systems   PSYCHIATRIC: Pertinant items are noted in the narrative.  CONSTITUTIONAL: No weight gain or loss.  MUSCULOSKELETAL: No pain or stiffness of the joints.  NEUROLOGIC: No weakness, sensory changes, seizures, confusion, memory loss, tremor or other abnormal movements.  CARDIAC: No CP, no palpitations  RESPIRATORY: No shortness of breath.  CARDIOVASCULAR: No tachycardia or chest pain.  GASTROINTESTINAL: No nausea, vomiting, pain, constipation or diarrhea.    Past Medical, Family and Social History: The patient's past medical, family and social history have been reviewed and updated as appropriate within the electronic medical record - see encounter notes.    Compliance: fair to poor    Side effects: " "denies    Risk Parameters:  Patient reports no suicidal ideation  Patient reports no homicidal ideation  Patient reports no self-injurious behavior  Patient reports no violent behavior    Exam (detailed: at least 9 elements; comprehensive: all 15 elements)   Constitutional  Vitals:  Most recent vital signs, dated less than 90 days prior to this appointment, were reviewed.     Vitals:    04/23/24 1232 04/23/24 1234   BP: (!) 153/96 (!) 142/90   Pulse: 88 88   Temp: 98.2 °F (36.8 °C)    SpO2: 99%    Weight: 96.3 kg (212 lb 4.8 oz)         General:   Constitutional: No acute distress, appears stated age, casually dressed    Neurologic:   Motor: moves all extremities spontaneously and without difficulty, no abnormal involuntary movements observed  Gait: normal gait and station    Mental status examination:   Appearance: appears stated age, casually dressed, no acute distress  Behavior: unremarkable for situation, calm and cooperative  Mood: "ok"  Affect: mood congruent and constricted  Thought process: linear and goal directed  Thought content: no plan or desire for self harm or harm to others, denies paranoia, no delusional ideation volunteered  Perceptions: denies hallucinations or other altered perceptions  Associations: appropriate for conversation  Orientation: oriented to day of week, month, year, location, and situation  Language: English, fluid  Attention: able to attend to interview  Insight: fair  Judgement: fair    PHQ9:  Over the last two weeks how often have you been bothered by little interest or pleasure in doing things: 0  Over the last two weeks how often have you been bothered by feeling down, depressed or hopeless: 0  PHQ-2 Total Score: 0  PHQ-9 Score: 0  PHQ-9 Interpretation: Minimal or None          4/23/2024    12:32 PM 2/23/2024     9:46 AM   GAD7   1. Feeling nervous, anxious, or on edge? 1 2   2. Not being able to stop or control worrying? 1 2   3. Worrying too much about different things? 1 2 "   4. Trouble relaxing? 0 2   5. Being so restless that it is hard to sit still? 0 2   6. Becoming easily annoyed or irritable? 0 0   7. Feeling afraid as if something awful might happen? 0 2   8. If you checked off any problems, how difficult have these problems made it for you to do your work, take care of things at home, or get along with other people? 0 2   CAMPBELL-7 Score 3 12     Assessment and Diagnosis   Status/Progress: Based on the examination today, the patient's problem(s) is/are improved.  New problems have not been presented today.   Lack of compliance is complicating management of the primary condition.  Number of separate conditions addressed during today's visit: 1 (alcohol use disorder improving) .  Are medication adjustments being made today: yes.  Are referral(s) being ordered today: No.  Complexity (level) of medical decision making employed in the encounter: MODERATE.    General Impression:    ICD-10-CM ICD-9-CM   1. Alcohol use disorder, severe, dependence  F10.20 303.90     Intervention/Counseling/Treatment Plan   Increase acamprosate to 666mg tid  Recommended mutual support groups (AA)  Recommended gradual de-escalation of drinking rather than sudden discontinuation due to risk of withdrawal  Pt defers rehab for now  PEth + at initial visit  No need for PEC as pt is not an imminent danger to self or others or gravely disabled due to acute psychiatric illness  Discussed that pt should either call clinic for psychiatric crisis symptoms or present to nearest emergency room    Discussed with patient informed consent including diagnosis, risks and benefits of proposed treatment above vs. alternative treatments vs. no treatment, as well as serious and common side effects of these treatments, and the inherent unpredictability of individual responses to these treatments. The patient expresses understanding of the above and displays the capacity to agree with this current plan. Patient also agrees that,  currently, the benefits outweigh the risks and would like to pursue treatment at this time, and had no other questions.    Instructions:  Take all medications as prescribed.    Abstain from recreational drugs and alcohol.  Present to ED or call 911 for SI/HI plan or intent, psychosis, or medical emergency.    Return to Clinic: No follow-ups on file.    Total time:   The total time for services performed on the date of the encounter (including review of prior visit notes, review of notes from other providers, review of results from laboratory/imaging studies, face-to-face time with patient, and time spent on other activities directly related to patient care): 20 minutes.    Natan Lowe MD  [facility]

## 2024-05-06 ENCOUNTER — APPOINTMENT (OUTPATIENT)
Dept: LAB | Facility: HOSPITAL | Age: 59
End: 2024-05-06
Payer: MEDICAID

## 2024-05-06 ENCOUNTER — OFFICE VISIT (OUTPATIENT)
Dept: GASTROENTEROLOGY | Facility: CLINIC | Age: 59
End: 2024-05-06
Payer: MEDICAID

## 2024-05-06 VITALS
BODY MASS INDEX: 31.25 KG/M2 | WEIGHT: 211 LBS | HEIGHT: 69 IN | HEART RATE: 83 BPM | OXYGEN SATURATION: 96 % | SYSTOLIC BLOOD PRESSURE: 137 MMHG | DIASTOLIC BLOOD PRESSURE: 87 MMHG | RESPIRATION RATE: 16 BRPM | TEMPERATURE: 98 F

## 2024-05-06 DIAGNOSIS — K70.30 ALCOHOLIC CIRRHOSIS OF LIVER WITHOUT ASCITES: Primary | ICD-10-CM

## 2024-05-06 PROCEDURE — 99214 OFFICE O/P EST MOD 30 MIN: CPT | Mod: PBBFAC | Performed by: STUDENT IN AN ORGANIZED HEALTH CARE EDUCATION/TRAINING PROGRAM

## 2024-05-06 NOTE — PROGRESS NOTES
Subjective     Patient ID: Ger Denton is a 58 y.o. male.    Chief Complaint: Cirrhosis (No complaints.  PT still drinking)    58-year-old male, history of profound alcoholism, presenting to clinic today for initial appointment with me.  Was last seen by his PCP in April 2022, right personally staffed the resident, he at that point had stated he was drinking a 40 oz of beer a day, that it was affecting his lifestyle relationships with others.  Currently unemployed lives with his sister.  He is currently taking disulfiram for and states he has cut back his drinking to a 6 pack on Saturdays and Sundays where he does not take his aforementioned medication.  He recently had an ER visit June 2022 for volume depletion SEUN, was given IV fluids and discharged, however his lab work at that time it showed his platelets had decreased from a baseline of around 123, now at 76.  I had a kelsey discussion with him today in the room but he is already showing signs of decompensation with decreased platelet count now 76, and that today we need to get updated MELD labs to further ascertain his extent of decompensation.  He denies any melena, hematochezia, hematemesis, or jaundice.  Denies any abdominal lower extremity swelling.  He is overdue for updated EGD, on abdominal ultrasound his last abdominal ultrasound performed November 2021 showed hepatic steatosis.  His last MELD of June 2021 was 11, child Jean Baptiste score class A. He is currently taking diclofenac for reasons unknown, discussed with him will be stopping this medication today.       10/31/22:  No complaints today, states states he still drinks 1 beer on occasion maybe 1 beer per week.  Last saw PCP in September, already with much improvement in his laboratory findings including his platelet count was now back to 209.  He is still taking disulfiram.  Did not get his updated ultrasound of the abdomen for HCC screening will reorder.  Current MELD is 9, Child Jean Baptiste class a.  Denies  any melena, hematochezia, jaundice, hematemesis.     5/1/23:  Relapsed, now drinking 1-240 oz beers per day, previously had actually been improving and maintain abstinence from alcohol when I last saw him in October, his platelet count had been improving, his most recent ultrasound done in February can actually shown marked improvement in his steatosis, and as far as his EGD that was performed November 2022, he had a normal esophagus and duodenum.  However his antrum was biopsied the did show positive for H pylori he completed 10 day course of Pylera.  We will need to repeat his stool antigen.  Does need up-to-date MELD labs as well.  Discussed with him at length, with his current relapse, he states he will attempt to try and maintain abstinence again as he had done last year given his marked improvement overall at that time.  He sees his PCP, Dr. Singh tomorrow.  Denies any melena, hematochezia, jaundice.     11/6/23:  Still drinking 1-2x 40 oz beers per day, was prescribed naltrexone by PCP.  States he is had difficulty standing for prolonged periods of time, does not exercise frequently, has difficulty with transportation.  Repeated his stool antigen to determine eradication of H pylori, stool antigen was negative.  Updated MELD is 6, Child Jean Baptiste class a.  Up-to-date on variceal screening next will be due November 2024.  He no showed it seems that his last sonogram appointment, he needs updated HCC screening.  Denies any melena, hematochezia, jaundice, hematemesis.      Today's visit:  Following Psychiatry outpatient, Dr. Lowe, recently prescribed acamprosate and has been uptitrated to 666 mg t.i.d..  He states to me he did not up titrate his medications yet as he was out of town for a couple of weeks,  was at a wedding.  Unfortunately still consuming alcohol,  had a beer last night and had beer at a wedding he attended out of town.  His primary care physician at ordered MELD labs for today, sodium  139, creatinine 1.02, albumin 3.7, bilirubin 0.4, awaiting INR to calculate MELD.  Denies any melena, hematochezia, jaundice, hematemesis, lower extremity swelling.  HCC screening was updated November 2023 see results below.  Due for repeat endoscopy November of this year.  Phosphatidyl ethanol level was 972 on 03/01/2024.  Review of Systems   Constitutional: Negative.    HENT: Negative.     Eyes: Negative.    Respiratory: Negative.     Cardiovascular: Negative.    Gastrointestinal: Negative.    Endocrine: Negative.    Genitourinary: Negative.    Musculoskeletal: Negative.    Allergic/Immunologic: Negative.    Neurological: Negative.    Hematological: Negative.    Psychiatric/Behavioral: Negative.          Objective   Vitals:    05/06/24 1318   BP: 137/87   Pulse: 83   Resp: 16   Temp: 97.6 °F (36.4 °C)         Physical Exam  Constitutional:       Appearance: Normal appearance. He is obese.   HENT:      Head: Normocephalic and atraumatic.      Nose: Nose normal.      Mouth/Throat:      Mouth: Mucous membranes are moist.      Pharynx: Oropharynx is clear.   Eyes:      Conjunctiva/sclera: Conjunctivae normal.      Pupils: Pupils are equal, round, and reactive to light.   Cardiovascular:      Rate and Rhythm: Normal rate and regular rhythm.      Pulses: Normal pulses.      Heart sounds: Normal heart sounds.   Pulmonary:      Effort: Pulmonary effort is normal.      Breath sounds: Normal breath sounds.   Abdominal:      General: Abdomen is flat. Bowel sounds are normal.   Skin:     General: Skin is warm and dry.   Neurological:      General: No focal deficit present.      Mental Status: He is alert and oriented to person, place, and time. Mental status is at baseline.        Assessment and Plan     1. Alcoholic cirrhosis of liver without ascites  -     US Abdomen Limited_Liver; Future; Expected date: 05/20/2024        Background:  Alcohol:  Yes, still drinking beer occasionally.     Tobacco: yes     Liver disease:  ETOH     MELD-Na: 6, needs to be updated today.  MELD labs were drawn today, awaiting INR.  Child-Jean Baptiste Class: A     Transplant: not under evaluation, low MELD     Cirrhosis is decompensated with:     Ascites: no  Spontaneous bacterial peritonitis: No  Hepatic Encephalopathy: No  Hepatocellular carcinoma: No  Hepatorenal syndrome: No  Hyponatremia: yes  Muscle wasting: No  Portal vein thrombosis: No     Screening:  Last EGD:  November 28, 2022: Normal esophagus and duodenum.  Antrum revealed evidence of gastritis was biopsied, positive for Helicobacter pylori.        Last imaging study:  Abd US 11/20/23:  Hepatic steatosis, no hepatic masses seen.     CIRRHOSIS COUNSELING:  - strict abstinence of alcohol use  - low sodium (salt) 2000mg per day  - Nutrition: 25-30kcal (calorie per body weight in kilogram) per day  - No need to restrict protein in diet  - High protein diet: 1.2-1.5 gram/kg (protein per body weight in kg) per day to prevent muscle mass loss  - Resistance exercises for muscle strength  - Avoid raw seafoods due to risk of fatal Vibrio vulnificus infection  - Avoid Non-steroidal anti-inflammatory drugs (NSAIDs) such as ibuprofen, Motrin, naproxen, Aleve due to risk of kidney damage  - Can take acetaminophen (tylenol), no more than 2000mg per day  - Compliance with all medications  - Ultrasound or MRI of the liver every 6 months for liver cancer screening  - Upper endoscopy every 1-2 years to screen for varices      Continue follow up with Psychiatry, on acamprosate 666 mg TID.  Has follow up with them in July apparently.  EGD slated for November of 2024, awaiting INR today to fully calculate his MELD labs.  Updated HCC screening to be performed later this month.        Follow up in about 6 months (around 11/6/2024).

## 2024-05-12 DIAGNOSIS — E83.42 HYPOMAGNESEMIA: Primary | ICD-10-CM

## 2024-05-12 RX ORDER — LANOLIN ALCOHOL/MO/W.PET/CERES
400 CREAM (GRAM) TOPICAL 2 TIMES DAILY
Qty: 90 TABLET | Refills: 1 | Status: SHIPPED | OUTPATIENT
Start: 2024-05-12 | End: 2024-06-09

## 2024-05-14 ENCOUNTER — TELEPHONE (OUTPATIENT)
Dept: INTERNAL MEDICINE | Facility: CLINIC | Age: 59
End: 2024-05-14
Payer: MEDICAID

## 2024-05-14 NOTE — TELEPHONE ENCOUNTER
----- Message from Natan Singh MD sent at 5/12/2024 10:40 PM CDT -----  Hello,     Please inform patient that I have prescribed him magnesium oxide 400 mg b.i.d..  This medication may need to be purchase over-the-counter, but nonetheless patient needs to start taking magnesium.  Ideally, patient should take magnesium oxide 400 mg b.i.d. for 1 week and then reduced to Mag oxide 400 mg just once daily until I see him next in recheck.    Thank you,   Natan Singh MD  [facility] Internal Medicine, -2  ----- Message -----  From: Lab, Background User  Sent: 5/6/2024  12:52 PM CDT  To: Natan Singh MD

## 2024-05-14 NOTE — TELEPHONE ENCOUNTER
Called patient and date of birth verified. Notified patient of new order for magnesium oxide. Patient verbalized understanding and repeated instructions to this nurse.

## 2024-05-27 ENCOUNTER — TELEPHONE (OUTPATIENT)
Dept: GASTROENTEROLOGY | Facility: CLINIC | Age: 59
End: 2024-05-27
Payer: MEDICAID

## 2024-05-27 NOTE — TELEPHONE ENCOUNTER
----- Message from Cony Song sent at 5/27/2024  1:06 PM CDT -----  Regarding: No contact to [facility] EGD  Dr. José ordered an EGD on pt and I have had no success in reaching pt. The number in chart is pt's sister Danielle I have left multiple voicemail's with no response. Info as follows:    5/7 lvm  5/9 spoke with Danielle she will have pt call us  5/13 lvm  5/16 lvm  5/20 lvm  5/27 lvm          Thank You  Yaneth GAN

## 2024-06-09 ENCOUNTER — HOSPITAL ENCOUNTER (EMERGENCY)
Facility: HOSPITAL | Age: 59
Discharge: HOME | End: 2024-06-09
Attending: STUDENT IN AN ORGANIZED HEALTH CARE EDUCATION/TRAINING PROGRAM
Payer: MEDICAID

## 2024-06-09 VITALS
HEART RATE: 94 BPM | RESPIRATION RATE: 18 BRPM | DIASTOLIC BLOOD PRESSURE: 98 MMHG | OXYGEN SATURATION: 99 % | TEMPERATURE: 98 F | BODY MASS INDEX: 31.25 KG/M2 | SYSTOLIC BLOOD PRESSURE: 140 MMHG | HEIGHT: 69 IN | WEIGHT: 211 LBS

## 2024-06-09 DIAGNOSIS — E83.42 HYPOMAGNESEMIA: Primary | ICD-10-CM

## 2024-06-09 DIAGNOSIS — K59.00 CONSTIPATION, UNSPECIFIED CONSTIPATION TYPE: ICD-10-CM

## 2024-06-09 DIAGNOSIS — R11.2 NAUSEA & VOMITING: ICD-10-CM

## 2024-06-09 LAB
ALBUMIN SERPL-MCNC: 3.6 G/DL (ref 3.5–5)
ALBUMIN/GLOB SERPL: 0.8 RATIO (ref 1.1–2)
ALP SERPL-CCNC: 54 UNIT/L (ref 40–150)
ALT SERPL-CCNC: 25 UNIT/L (ref 0–55)
ANION GAP SERPL CALC-SCNC: 20 MEQ/L
AST SERPL-CCNC: 46 UNIT/L (ref 5–34)
BACTERIA #/AREA URNS AUTO: ABNORMAL /HPF
BASOPHILS # BLD AUTO: 0.02 X10(3)/MCL
BASOPHILS NFR BLD AUTO: 0.3 %
BILIRUB SERPL-MCNC: 1.1 MG/DL
BILIRUB UR QL STRIP.AUTO: NEGATIVE
BUN SERPL-MCNC: 21.5 MG/DL (ref 8.4–25.7)
CALCIUM SERPL-MCNC: 9.7 MG/DL (ref 8.4–10.2)
CHLORIDE SERPL-SCNC: 96 MMOL/L (ref 98–107)
CLARITY UR: ABNORMAL
CO2 SERPL-SCNC: 19 MMOL/L (ref 22–29)
COLOR UR AUTO: YELLOW
CREAT SERPL-MCNC: 1.42 MG/DL (ref 0.73–1.18)
CREAT/UREA NIT SERPL: 15
EOSINOPHIL # BLD AUTO: 0.02 X10(3)/MCL (ref 0–0.9)
EOSINOPHIL NFR BLD AUTO: 0.3 %
ERYTHROCYTE [DISTWIDTH] IN BLOOD BY AUTOMATED COUNT: 12.9 % (ref 11.5–17)
GFR SERPLBLD CREATININE-BSD FMLA CKD-EPI: 57 ML/MIN/1.73/M2
GLOBULIN SER-MCNC: 4.8 GM/DL (ref 2.4–3.5)
GLUCOSE SERPL-MCNC: 97 MG/DL (ref 74–100)
GLUCOSE UR QL STRIP: NORMAL
HCT VFR BLD AUTO: 40.8 % (ref 42–52)
HGB BLD-MCNC: 14.5 G/DL (ref 14–18)
HGB UR QL STRIP: NEGATIVE
HYALINE CASTS #/AREA URNS LPF: >20 /LPF
IMM GRANULOCYTES # BLD AUTO: 0.02 X10(3)/MCL (ref 0–0.04)
IMM GRANULOCYTES NFR BLD AUTO: 0.3 %
KETONES UR QL STRIP: ABNORMAL
LEUKOCYTE ESTERASE UR QL STRIP: NEGATIVE
LIPASE SERPL-CCNC: 23 U/L
LYMPHOCYTES # BLD AUTO: 1.08 X10(3)/MCL (ref 0.6–4.6)
LYMPHOCYTES NFR BLD AUTO: 17 %
MAGNESIUM SERPL-MCNC: 1.1 MG/DL (ref 1.6–2.6)
MCH RBC QN AUTO: 30.3 PG (ref 27–31)
MCHC RBC AUTO-ENTMCNC: 35.5 G/DL (ref 33–36)
MCV RBC AUTO: 85.4 FL (ref 80–94)
MONOCYTES # BLD AUTO: 0.51 X10(3)/MCL (ref 0.1–1.3)
MONOCYTES NFR BLD AUTO: 8 %
MUCOUS THREADS URNS QL MICRO: ABNORMAL /LPF
NEUTROPHILS # BLD AUTO: 4.71 X10(3)/MCL (ref 2.1–9.2)
NEUTROPHILS NFR BLD AUTO: 74.1 %
NITRITE UR QL STRIP: NEGATIVE
NRBC BLD AUTO-RTO: 0 %
PH UR STRIP: 5.5 [PH]
PLATELET # BLD AUTO: 133 X10(3)/MCL (ref 130–400)
PLATELETS.RETICULATED NFR BLD AUTO: 6.1 % (ref 0.9–11.2)
PMV BLD AUTO: 11 FL (ref 7.4–10.4)
POTASSIUM SERPL-SCNC: 3 MMOL/L (ref 3.5–5.1)
PROT SERPL-MCNC: 8.4 GM/DL (ref 6.4–8.3)
PROT UR QL STRIP: ABNORMAL
RBC # BLD AUTO: 4.78 X10(6)/MCL (ref 4.7–6.1)
RBC #/AREA URNS AUTO: ABNORMAL /HPF
SODIUM SERPL-SCNC: 135 MMOL/L (ref 136–145)
SP GR UR STRIP.AUTO: 1.02 (ref 1–1.03)
SQUAMOUS #/AREA URNS LPF: ABNORMAL /HPF
TROPONIN I SERPL-MCNC: <0.01 NG/ML (ref 0–0.04)
UROBILINOGEN UR STRIP-ACNC: ABNORMAL
WBC # SPEC AUTO: 6.36 X10(3)/MCL (ref 4.5–11.5)
WBC #/AREA URNS AUTO: ABNORMAL /HPF

## 2024-06-09 PROCEDURE — 83690 ASSAY OF LIPASE: CPT | Performed by: NURSE PRACTITIONER

## 2024-06-09 PROCEDURE — 83735 ASSAY OF MAGNESIUM: CPT | Performed by: NURSE PRACTITIONER

## 2024-06-09 PROCEDURE — 93005 ELECTROCARDIOGRAM TRACING: CPT

## 2024-06-09 PROCEDURE — 63600175 PHARM REV CODE 636 W HCPCS: Performed by: NURSE PRACTITIONER

## 2024-06-09 PROCEDURE — 96365 THER/PROPH/DIAG IV INF INIT: CPT

## 2024-06-09 PROCEDURE — 25000003 PHARM REV CODE 250: Performed by: NURSE PRACTITIONER

## 2024-06-09 PROCEDURE — 99285 EMERGENCY DEPT VISIT HI MDM: CPT | Mod: 25

## 2024-06-09 PROCEDURE — 80053 COMPREHEN METABOLIC PANEL: CPT | Performed by: NURSE PRACTITIONER

## 2024-06-09 PROCEDURE — 81001 URINALYSIS AUTO W/SCOPE: CPT | Performed by: NURSE PRACTITIONER

## 2024-06-09 PROCEDURE — 96361 HYDRATE IV INFUSION ADD-ON: CPT

## 2024-06-09 PROCEDURE — 96375 TX/PRO/DX INJ NEW DRUG ADDON: CPT

## 2024-06-09 PROCEDURE — 85025 COMPLETE CBC W/AUTO DIFF WBC: CPT | Performed by: NURSE PRACTITIONER

## 2024-06-09 PROCEDURE — 84484 ASSAY OF TROPONIN QUANT: CPT | Performed by: NURSE PRACTITIONER

## 2024-06-09 RX ORDER — ONDANSETRON HYDROCHLORIDE 2 MG/ML
4 INJECTION, SOLUTION INTRAVENOUS
Status: COMPLETED | OUTPATIENT
Start: 2024-06-09 | End: 2024-06-09

## 2024-06-09 RX ORDER — LANOLIN ALCOHOL/MO/W.PET/CERES
400 CREAM (GRAM) TOPICAL 2 TIMES DAILY
Qty: 90 TABLET | Refills: 0 | Status: SHIPPED | OUTPATIENT
Start: 2024-06-09

## 2024-06-09 RX ORDER — POTASSIUM CHLORIDE 750 MG/1
10 TABLET, EXTENDED RELEASE ORAL DAILY
Qty: 5 TABLET | Refills: 0 | Status: SHIPPED | OUTPATIENT
Start: 2024-06-09 | End: 2024-06-14

## 2024-06-09 RX ORDER — ONDANSETRON 4 MG/1
4 TABLET, ORALLY DISINTEGRATING ORAL EVERY 8 HOURS PRN
Qty: 9 TABLET | Refills: 0 | Status: SHIPPED | OUTPATIENT
Start: 2024-06-09 | End: 2024-06-12

## 2024-06-09 RX ORDER — MAGNESIUM SULFATE HEPTAHYDRATE 40 MG/ML
2 INJECTION, SOLUTION INTRAVENOUS
Status: COMPLETED | OUTPATIENT
Start: 2024-06-09 | End: 2024-06-09

## 2024-06-09 RX ORDER — DOCUSATE SODIUM 100 MG/1
100 CAPSULE, LIQUID FILLED ORAL 2 TIMES DAILY PRN
Qty: 14 CAPSULE | Refills: 0 | Status: SHIPPED | OUTPATIENT
Start: 2024-06-09 | End: 2024-06-16

## 2024-06-09 RX ORDER — POTASSIUM CHLORIDE 20 MEQ/1
40 TABLET, EXTENDED RELEASE ORAL
Status: COMPLETED | OUTPATIENT
Start: 2024-06-09 | End: 2024-06-09

## 2024-06-09 RX ADMIN — ONDANSETRON 4 MG: 2 INJECTION INTRAMUSCULAR; INTRAVENOUS at 03:06

## 2024-06-09 RX ADMIN — SODIUM CHLORIDE, POTASSIUM CHLORIDE, SODIUM LACTATE AND CALCIUM CHLORIDE 1000 ML: 600; 310; 30; 20 INJECTION, SOLUTION INTRAVENOUS at 04:06

## 2024-06-09 RX ADMIN — SODIUM CHLORIDE 1000 ML: 9 INJECTION, SOLUTION INTRAVENOUS at 03:06

## 2024-06-09 RX ADMIN — MAGNESIUM SULFATE HEPTAHYDRATE 2 G: 40 INJECTION, SOLUTION INTRAVENOUS at 05:06

## 2024-06-09 RX ADMIN — POTASSIUM CHLORIDE 40 MEQ: 1500 TABLET, EXTENDED RELEASE ORAL at 04:06

## 2024-06-09 NOTE — DISCHARGE INSTRUCTIONS
Increase fluid intake, clear liquids x 12 hours. Advance diet slowly.  Take medication as prescribed.  Follow up with your primary care physician in 3-5 days for follow up evaluation.  Decrease alcohol consumption.

## 2024-06-09 NOTE — ED PROVIDER NOTES
"Encounter Date: 6/9/2024       History     Chief Complaint   Patient presents with    Abdominal Pain     C/o abdominal pain since Friday. States had diarrhea and vomiting "the other day". Also drinks about a 6 pack of beer a day. States drank a little bit today      Pt is a 58 y.o. male who presents to the [facility] ED for evaluation of nausea, vomiting, and diarrhea. Reports symptoms began 2 days ago. Hx of HTN, cirrhosis. Admits to alcohol use throughout the time frame in question. Denies chest pain, SOB, weakness, dizziness, rectal bleeding, black stool, or maroon stool.      Review of patient's allergies indicates:  No Known Allergies  Past Medical History:   Diagnosis Date    Cirrhosis     Hypertension      Past Surgical History:   Procedure Laterality Date    ESOPHAGOGASTRODUODENOSCOPY      none       Family History   Problem Relation Name Age of Onset    Cancer Sister       Social History     Tobacco Use    Smoking status: Never     Passive exposure: Past    Smokeless tobacco: Never   Substance Use Topics    Alcohol use: Yes     Alcohol/week: 42.0 standard drinks of alcohol     Types: 42 Cans of beer per week     Comment: 1-2 40oz beer/d    Drug use: Not Currently     Types: Marijuana     Comment: states "every now and then"     Review of Systems   Constitutional:  Negative for chills, diaphoresis, fatigue and fever.   HENT:  Negative for facial swelling, postnasal drip, rhinorrhea, sinus pressure, sinus pain, sore throat and trouble swallowing.    Respiratory:  Negative for cough, chest tightness, shortness of breath and wheezing.    Cardiovascular:  Negative for chest pain, palpitations and leg swelling.   Gastrointestinal:  Positive for abdominal pain, diarrhea, nausea and vomiting. Negative for anal bleeding and blood in stool.   Genitourinary:  Negative for dysuria, flank pain, hematuria and urgency.   Musculoskeletal:  Negative for arthralgias, back pain and myalgias.   Skin:  Negative for color change and " rash.   Neurological:  Negative for dizziness, syncope, weakness and headaches.   Hematological:  Does not bruise/bleed easily.   All other systems reviewed and are negative.      Physical Exam     Initial Vitals [06/09/24 1518]   BP Pulse Resp Temp SpO2   (!) 150/92 (!) 130 18 97.7 °F (36.5 °C) 98 %      MAP       --         Physical Exam    Nursing note and vitals reviewed.  Constitutional: Vital signs are normal. He appears well-developed and well-nourished.   HENT:   Head: Normocephalic.   Nose: Nose normal.   Mouth/Throat: Oropharynx is clear and moist.   Eyes: Conjunctivae and EOM are normal. Pupils are equal, round, and reactive to light.   Neck: Neck supple.   Normal range of motion.  Cardiovascular:  Normal rate, regular rhythm, normal heart sounds and intact distal pulses.           Pulmonary/Chest: Effort normal and breath sounds normal. No respiratory distress. He has no wheezes. He has no rhonchi. He has no rales. He exhibits no tenderness.   Abdominal: Abdomen is soft and flat. Bowel sounds are normal. There is abdominal tenderness in the left upper quadrant. There is no rebound, no guarding, no tenderness at McBurney's point and negative Feliz's sign.   Musculoskeletal:         General: Normal range of motion.      Cervical back: Normal range of motion and neck supple.     Neurological: He is alert and oriented to person, place, and time. He has normal strength.   Skin: Skin is warm and dry. Capillary refill takes less than 2 seconds.   Psychiatric: He has a normal mood and affect. His behavior is normal. Judgment and thought content normal.         ED Course   Procedures  Labs Reviewed   COMPREHENSIVE METABOLIC PANEL - Abnormal; Notable for the following components:       Result Value    Sodium 135 (*)     Potassium 3.0 (*)     Chloride 96 (*)     CO2 19 (*)     Creatinine 1.42 (*)     Protein Total 8.4 (*)     Globulin 4.8 (*)     Albumin/Globulin Ratio 0.8 (*)     AST 46 (*)     All other  components within normal limits   CBC WITH DIFFERENTIAL - Abnormal; Notable for the following components:    Hct 40.8 (*)     MPV 11.0 (*)     All other components within normal limits   MAGNESIUM - Abnormal; Notable for the following components:    Magnesium Level 1.10 (*)     All other components within normal limits   LIPASE - Normal   TROPONIN I - Normal   CBC W/ AUTO DIFFERENTIAL    Narrative:     The following orders were created for panel order CBC Auto Differential.  Procedure                               Abnormality         Status                     ---------                               -----------         ------                     CBC with Differential[[id number]]       Abnormal            Final result                 Please view results for these tests on the individual orders.   URINALYSIS, REFLEX TO URINE CULTURE          Imaging Results              X-Ray Abdomen Flat And Erect (Final result)  Result time 06/09/24 16:57:56      Final result by Zeferino Childress MD (06/09/24 16:57:56)                   Impression:      Nonobstructive bowel gas pattern.  Findings as would be seen with constipation.      Electronically signed by: Zeferino Childress  Date:    06/09/2024  Time:    16:57               Narrative:    EXAMINATION:  XR ABDOMEN FLAT AND ERECT    CLINICAL HISTORY:  Nausea with vomiting, unspecified    TECHNIQUE:  Flat and upright imaging of the abdomen    COMPARISON:  None    FINDINGS:  No acute osseous abnormality.  Nonobstructive bowel gas pattern.  Stool noted throughout the colon.                                       Medications   ondansetron injection 4 mg (4 mg Intravenous Given 6/9/24 1533)   sodium chloride 0.9% bolus 999 mL 999 mL (0 mLs Intravenous Stopped 6/9/24 1633)   lactated ringers bolus 999 mL (0 mLs Intravenous Stopped 6/9/24 1753)   potassium chloride SA CR tablet 40 mEq (40 mEq Oral Given 6/9/24 1635)   magnesium sulfate 2g in water 50mL IVPB (premix) (0 g Intravenous  Stopped 6/9/24 1742)     Medical Decision Making  Differential:  Pancreatitis  Abdominal pain  AMI  Electrolyte imbalance      Amount and/or Complexity of Data Reviewed  Labs: ordered.  Radiology: ordered.    Risk  Prescription drug management.               ED Course as of 06/09/24 1759   Sun Jun 09, 2024   1715 Given strict ED return precautions. I have spoken with the patient and/or caregivers. I have explained the patient's condition, diagnoses and treatment plan based on the information available to me at this time. I have answered the patient's and/or caregiver's questions and addressed any concerns. The patient and/or caregivers have as good an understanding of the patient's diagnosis, condition and treatment plan as can be expected at this point. The vital signs have been stable. The patient's condition is stable and appropriate for discharge from the emergency department.      The patient will pursue further outpatient evaluation with the primary care physician or other designated or consulting physician as outlined in the discharge instructions. The patient and/or caregivers are agreeable to this plan of care and follow-up instructions have been explained in detail. The patient and/or caregivers have received these instructions in written format and have expressed an understanding of the discharge instructions. The patient and/or caregivers are aware that any significant change in condition or worsening of symptoms should prompt an immediate return to this or the closest emergency department or a call to 911.  [JA]      ED Course User Index  [JA] Charles Wiseman Jr., FNP                           Clinical Impression:  Final diagnoses:  [R11.2] Nausea & vomiting  [E83.42] Hypomagnesemia (Primary)  [K59.00] Constipation, unspecified constipation type          ED Disposition Condition    Discharge Stable          ED Prescriptions       Medication Sig Dispense Start Date End Date Auth. Provider    magnesium  oxide (MAG-OX) 400 mg (241.3 mg magnesium) tablet Take 1 tablet (400 mg total) by mouth 2 (two) times daily. 90 tablet 6/9/2024 -- Charles Wiseman Jr., FNP    docusate sodium (COLACE) 100 MG capsule Take 1 capsule (100 mg total) by mouth 2 (two) times daily as needed for Constipation. 14 capsule 6/9/2024 6/16/2024 Charles Wiseman Jr., FNP    ondansetron (ZOFRAN-ODT) 4 MG TbDL Take 1 tablet (4 mg total) by mouth every 8 (eight) hours as needed (Nausea). 9 tablet 6/9/2024 6/12/2024 Charles Wiseman Jr., FNP    potassium chloride (KLOR-CON) 10 MEQ TbSR Take 1 tablet (10 mEq total) by mouth once daily. for 5 days 5 tablet 6/9/2024 6/14/2024 Charles Wiseman Jr., REJI          Follow-up Information       Follow up With Specialties Details Why Contact Info    Natan Singh MD Internal Medicine In 3 days  [address] [facility] [address]  [phone]      [facility] Emergency Medicine In 3 days As needed, If symptoms worsen [address] [facility] [id number] [phone]             Charles Wiseman Jr., FNP  06/09/24 8920

## 2024-06-10 LAB
OHS QRS DURATION: 90 MS
OHS QTC CALCULATION: 463 MS

## 2024-06-27 ENCOUNTER — OFFICE VISIT (OUTPATIENT)
Dept: INTERNAL MEDICINE | Facility: CLINIC | Age: 59
End: 2024-06-27
Payer: MEDICAID

## 2024-06-27 ENCOUNTER — HOSPITAL ENCOUNTER (EMERGENCY)
Facility: HOSPITAL | Age: 59
Discharge: HOME | End: 2024-06-27
Attending: EMERGENCY MEDICINE
Payer: MEDICAID

## 2024-06-27 ENCOUNTER — TELEPHONE (OUTPATIENT)
Dept: INTERNAL MEDICINE | Facility: CLINIC | Age: 59
End: 2024-06-27

## 2024-06-27 ENCOUNTER — LAB VISIT (OUTPATIENT)
Dept: LAB | Facility: HOSPITAL | Age: 59
End: 2024-06-27
Payer: MEDICAID

## 2024-06-27 VITALS
HEART RATE: 92 BPM | SYSTOLIC BLOOD PRESSURE: 107 MMHG | BODY MASS INDEX: 30.21 KG/M2 | HEIGHT: 69 IN | RESPIRATION RATE: 24 BRPM | DIASTOLIC BLOOD PRESSURE: 69 MMHG | OXYGEN SATURATION: 99 % | TEMPERATURE: 99 F | WEIGHT: 204 LBS

## 2024-06-27 VITALS
DIASTOLIC BLOOD PRESSURE: 83 MMHG | TEMPERATURE: 98 F | RESPIRATION RATE: 20 BRPM | WEIGHT: 202.81 LBS | BODY MASS INDEX: 29.95 KG/M2 | HEART RATE: 75 BPM | SYSTOLIC BLOOD PRESSURE: 138 MMHG | OXYGEN SATURATION: 100 %

## 2024-06-27 DIAGNOSIS — R33.9 URINARY RETENTION WITH INCOMPLETE BLADDER EMPTYING: ICD-10-CM

## 2024-06-27 DIAGNOSIS — F10.10 ALCOHOL ABUSE: ICD-10-CM

## 2024-06-27 DIAGNOSIS — E83.42 HYPOMAGNESEMIA: ICD-10-CM

## 2024-06-27 DIAGNOSIS — A04.8 H. PYLORI INFECTION: ICD-10-CM

## 2024-06-27 DIAGNOSIS — E55.9 VITAMIN D DEFICIENCY: ICD-10-CM

## 2024-06-27 DIAGNOSIS — I10 HYPERTENSION, UNSPECIFIED TYPE: ICD-10-CM

## 2024-06-27 DIAGNOSIS — E78.5 HYPERLIPIDEMIA, UNSPECIFIED HYPERLIPIDEMIA TYPE: ICD-10-CM

## 2024-06-27 DIAGNOSIS — F10.20 ALCOHOL USE DISORDER, SEVERE, DEPENDENCE: ICD-10-CM

## 2024-06-27 DIAGNOSIS — E87.6 HYPOKALEMIA: ICD-10-CM

## 2024-06-27 DIAGNOSIS — J31.0 RHINITIS, UNSPECIFIED TYPE: ICD-10-CM

## 2024-06-27 DIAGNOSIS — E87.6 HYPOKALEMIA: Primary | ICD-10-CM

## 2024-06-27 DIAGNOSIS — K59.00 CONSTIPATION, UNSPECIFIED CONSTIPATION TYPE: Primary | ICD-10-CM

## 2024-06-27 DIAGNOSIS — J32.9 CHRONIC SINUSITIS, UNSPECIFIED LOCATION: ICD-10-CM

## 2024-06-27 DIAGNOSIS — J31.0 CHRONIC RHINITIS: ICD-10-CM

## 2024-06-27 DIAGNOSIS — R53.1 WEAKNESS: ICD-10-CM

## 2024-06-27 DIAGNOSIS — K21.9 GASTROESOPHAGEAL REFLUX DISEASE, UNSPECIFIED WHETHER ESOPHAGITIS PRESENT: ICD-10-CM

## 2024-06-27 LAB
ALBUMIN SERPL-MCNC: 3.2 G/DL (ref 3.5–5)
ALBUMIN/GLOB SERPL: 0.8 RATIO (ref 1.1–2)
ALP SERPL-CCNC: 44 UNIT/L (ref 40–150)
ALT SERPL-CCNC: 16 UNIT/L (ref 0–55)
AMPHET UR QL SCN: NEGATIVE
ANION GAP SERPL CALC-SCNC: 10 MEQ/L
AST SERPL-CCNC: 23 UNIT/L (ref 5–34)
BARBITURATE SCN PRESENT UR: NEGATIVE
BENZODIAZ UR QL SCN: NEGATIVE
BILIRUB SERPL-MCNC: 0.3 MG/DL
BUN SERPL-MCNC: 8.4 MG/DL (ref 8.4–25.7)
CALCIUM SERPL-MCNC: 9.5 MG/DL (ref 8.4–10.2)
CANNABINOIDS UR QL SCN: NEGATIVE
CHLORIDE SERPL-SCNC: 104 MMOL/L (ref 98–107)
CO2 SERPL-SCNC: 25 MMOL/L (ref 22–29)
COCAINE UR QL SCN: NEGATIVE
CREAT SERPL-MCNC: 0.89 MG/DL (ref 0.73–1.18)
CREAT/UREA NIT SERPL: 9
ETHANOL SERPL-MCNC: <10 MG/DL
FENTANYL UR QL SCN: NEGATIVE
GFR SERPLBLD CREATININE-BSD FMLA CKD-EPI: >60 ML/MIN/1.73/M2
GLOBULIN SER-MCNC: 4 GM/DL (ref 2.4–3.5)
GLUCOSE SERPL-MCNC: 102 MG/DL (ref 74–100)
HBA1C MFR BLD: 5 %
HOLD SPECIMEN: NORMAL
HOLD SPECIMEN: NORMAL
INR PPP: 1.1
MAGNESIUM SERPL-MCNC: 0.9 MG/DL (ref 1.6–2.6)
MDMA UR QL SCN: NEGATIVE
OHS QRS DURATION: 86 MS
OHS QTC CALCULATION: 426 MS
OPIATES UR QL SCN: NEGATIVE
PCP UR QL: NEGATIVE
PH UR: 6 [PH] (ref 3–11)
PHOSPHATE SERPL-MCNC: 2.9 MG/DL (ref 2.3–4.7)
POTASSIUM SERPL-SCNC: 3 MMOL/L (ref 3.5–5.1)
PROT SERPL-MCNC: 7.2 GM/DL (ref 6.4–8.3)
PROTHROMBIN TIME: 13.9 SECONDS (ref 11.4–14)
SODIUM SERPL-SCNC: 139 MMOL/L (ref 136–145)
SPECIFIC GRAVITY, URINE AUTO (.000) (OHS): 1 (ref 1–1.03)

## 2024-06-27 PROCEDURE — 96366 THER/PROPH/DIAG IV INF ADDON: CPT

## 2024-06-27 PROCEDURE — 25000003 PHARM REV CODE 250: Performed by: EMERGENCY MEDICINE

## 2024-06-27 PROCEDURE — 83036 HEMOGLOBIN GLYCOSYLATED A1C: CPT | Mod: PBBFAC

## 2024-06-27 PROCEDURE — 99284 EMERGENCY DEPT VISIT MOD MDM: CPT | Mod: 25,27

## 2024-06-27 PROCEDURE — 85610 PROTHROMBIN TIME: CPT

## 2024-06-27 PROCEDURE — 80307 DRUG TEST PRSMV CHEM ANLYZR: CPT | Performed by: EMERGENCY MEDICINE

## 2024-06-27 PROCEDURE — 36415 COLL VENOUS BLD VENIPUNCTURE: CPT

## 2024-06-27 PROCEDURE — 93005 ELECTROCARDIOGRAM TRACING: CPT

## 2024-06-27 PROCEDURE — 83735 ASSAY OF MAGNESIUM: CPT

## 2024-06-27 PROCEDURE — 63600175 PHARM REV CODE 636 W HCPCS: Performed by: EMERGENCY MEDICINE

## 2024-06-27 PROCEDURE — 96368 THER/DIAG CONCURRENT INF: CPT

## 2024-06-27 PROCEDURE — 96365 THER/PROPH/DIAG IV INF INIT: CPT

## 2024-06-27 PROCEDURE — 80053 COMPREHEN METABOLIC PANEL: CPT

## 2024-06-27 PROCEDURE — 82077 ASSAY SPEC XCP UR&BREATH IA: CPT | Performed by: EMERGENCY MEDICINE

## 2024-06-27 PROCEDURE — 99213 OFFICE O/P EST LOW 20 MIN: CPT | Mod: PBBFAC,25

## 2024-06-27 PROCEDURE — 84100 ASSAY OF PHOSPHORUS: CPT

## 2024-06-27 RX ORDER — LORATADINE 10 MG/1
10 TABLET ORAL DAILY
Qty: 90 TABLET | Refills: 3 | Status: SHIPPED | OUTPATIENT
Start: 2024-06-27 | End: 2025-06-27

## 2024-06-27 RX ORDER — CARVEDILOL 6.25 MG/1
6.25 TABLET ORAL 2 TIMES DAILY WITH MEALS
Qty: 180 TABLET | Refills: 3 | Status: SHIPPED | OUTPATIENT
Start: 2024-06-27 | End: 2025-06-27

## 2024-06-27 RX ORDER — ACAMPROSATE CALCIUM 333 MG/1
666 TABLET, DELAYED RELEASE ORAL 3 TIMES DAILY
Qty: 180 TABLET | Refills: 11 | Status: SHIPPED | OUTPATIENT
Start: 2024-06-27

## 2024-06-27 RX ORDER — NIFEDIPINE 30 MG/1
30 TABLET, EXTENDED RELEASE ORAL DAILY
Qty: 90 TABLET | Refills: 3 | Status: SHIPPED | OUTPATIENT
Start: 2024-06-27 | End: 2025-06-27

## 2024-06-27 RX ORDER — VIT C/E/ZN/COPPR/LUTEIN/ZEAXAN 250MG-90MG
1000 CAPSULE ORAL DAILY
Qty: 90 CAPSULE | Refills: 1 | Status: SHIPPED | OUTPATIENT
Start: 2024-06-27

## 2024-06-27 RX ORDER — LACTULOSE 10 G/15ML
15 SOLUTION ORAL 3 TIMES DAILY
Qty: 90 ML | Refills: 5 | Status: SHIPPED | OUTPATIENT
Start: 2024-06-27

## 2024-06-27 RX ORDER — PANTOPRAZOLE SODIUM 40 MG/1
40 TABLET, DELAYED RELEASE ORAL DAILY
Qty: 90 TABLET | Refills: 3 | Status: SHIPPED | OUTPATIENT
Start: 2024-06-27 | End: 2025-06-27

## 2024-06-27 RX ORDER — POTASSIUM CHLORIDE 20 MEQ/1
40 TABLET, EXTENDED RELEASE ORAL
Status: COMPLETED | OUTPATIENT
Start: 2024-06-27 | End: 2024-06-27

## 2024-06-27 RX ORDER — TAMSULOSIN HYDROCHLORIDE 0.4 MG/1
0.4 CAPSULE ORAL DAILY
Qty: 90 CAPSULE | Refills: 3 | Status: SHIPPED | OUTPATIENT
Start: 2024-06-27 | End: 2025-06-27

## 2024-06-27 RX ORDER — POTASSIUM CHLORIDE 7.45 MG/ML
10 INJECTION INTRAVENOUS ONCE
Status: COMPLETED | OUTPATIENT
Start: 2024-06-27 | End: 2024-06-27

## 2024-06-27 RX ORDER — ATORVASTATIN CALCIUM 40 MG/1
40 TABLET, FILM COATED ORAL DAILY
Qty: 90 TABLET | Refills: 3 | Status: SHIPPED | OUTPATIENT
Start: 2024-06-27 | End: 2025-06-27

## 2024-06-27 RX ORDER — MAGNESIUM SULFATE HEPTAHYDRATE 40 MG/ML
2 INJECTION, SOLUTION INTRAVENOUS
Status: COMPLETED | OUTPATIENT
Start: 2024-06-27 | End: 2024-06-27

## 2024-06-27 RX ORDER — FOLIC ACID 1 MG/1
1 TABLET ORAL DAILY
Qty: 90 TABLET | Refills: 3 | Status: SHIPPED | OUTPATIENT
Start: 2024-06-27 | End: 2025-06-22

## 2024-06-27 RX ORDER — POTASSIUM CHLORIDE 20 MEQ/1
20 TABLET, EXTENDED RELEASE ORAL 2 TIMES DAILY
Qty: 60 TABLET | Refills: 3 | Status: SHIPPED | OUTPATIENT
Start: 2024-06-27

## 2024-06-27 RX ORDER — AMOXICILLIN 250 MG
1 CAPSULE ORAL 2 TIMES DAILY PRN
Qty: 45 TABLET | Refills: 3 | Status: SHIPPED | OUTPATIENT
Start: 2024-06-27

## 2024-06-27 RX ORDER — LANOLIN ALCOHOL/MO/W.PET/CERES
400 CREAM (GRAM) TOPICAL 2 TIMES DAILY
Qty: 90 TABLET | Refills: 0 | Status: SHIPPED | OUTPATIENT
Start: 2024-06-27

## 2024-06-27 RX ORDER — FLUTICASONE PROPIONATE 50 MCG
1 SPRAY, SUSPENSION (ML) NASAL DAILY
Qty: 16 G | Refills: 11 | Status: SHIPPED | OUTPATIENT
Start: 2024-06-27

## 2024-06-27 RX ORDER — POLYETHYLENE GLYCOL 3350 17 G/17G
17 POWDER, FOR SOLUTION ORAL DAILY
Qty: 72 EACH | Refills: 3 | Status: SHIPPED | OUTPATIENT
Start: 2024-06-27

## 2024-06-27 RX ADMIN — MAGNESIUM SULFATE IN WATER 2 G: 2 INJECTION, SOLUTION INTRAVENOUS at 11:06

## 2024-06-27 RX ADMIN — POTASSIUM CHLORIDE 40 MEQ: 1500 TABLET, EXTENDED RELEASE ORAL at 11:06

## 2024-06-27 RX ADMIN — POTASSIUM CHLORIDE 10 MEQ: 10 INJECTION INTRAVENOUS at 11:06

## 2024-06-27 NOTE — ED PROVIDER NOTES
"Encounter Date: 6/27/2024       History     Chief Complaint   Patient presents with    Abnormal Lab     PT SENT FROM CLINIC FOR ABNORMAL LAB RESULTS FROM THIS AM.  K+ 3.0 MAG. 0.90.  DAILY ETOH, PT VOICES NO COMPLAINTS.  EKG OBTAINED.      Sent for outpatient labs, persistent low potassium and magnesium kind of data reviewed.  Drinks beer daily, does not seem to really understand that beer has a significant alcohol component.  Counseled on this fact.  He states his last beer was about 2 weeks ago although I am not convinced this is accurate.  He has no symptoms and reports he is taking his medications as prescribed including magnesium oxide 400 mg, although the bottle is reviewed and appears to have more left in it than it should.  Denies signs or symptoms of alcohol withdrawal or delirium tremens.  Denies weakness.  No other complaints.    The history is provided by the patient. No  was used.     Review of patient's allergies indicates:  No Known Allergies  Past Medical History:   Diagnosis Date    Cirrhosis     Hypertension      Past Surgical History:   Procedure Laterality Date    ESOPHAGOGASTRODUODENOSCOPY      none       Family History   Problem Relation Name Age of Onset    Cancer Sister       Social History     Tobacco Use    Smoking status: Never     Passive exposure: Past    Smokeless tobacco: Never   Substance Use Topics    Alcohol use: Yes     Alcohol/week: 42.0 standard drinks of alcohol     Types: 42 Cans of beer per week     Comment: 1-2 40oz beer/d    Drug use: Not Currently     Types: Marijuana     Comment: states "every now and then"     Review of Systems   Constitutional:  Negative for chills and fever.   HENT:  Negative for congestion, facial swelling, nosebleeds and sinus pressure.    Eyes:  Negative for pain and redness.   Respiratory:  Negative for chest tightness, shortness of breath and wheezing.    Cardiovascular:  Negative for chest pain, palpitations and leg swelling. "   Gastrointestinal:  Negative for abdominal distention, abdominal pain, diarrhea, nausea and vomiting.   Endocrine: Negative for cold intolerance, polydipsia and polyphagia.   Genitourinary:  Negative for difficulty urinating, dysuria, frequency and hematuria.   Musculoskeletal:  Negative for arthralgias, back pain, myalgias and neck pain.   Skin:  Negative for color change and rash.   Neurological:  Negative for dizziness, weakness, numbness and headaches.   Hematological:  Negative for adenopathy. Does not bruise/bleed easily.   Psychiatric/Behavioral:  Negative for agitation and behavioral problems.    All other systems reviewed and are negative.      Physical Exam     Initial Vitals [06/27/24 1035]   BP Pulse Resp Temp SpO2   138/83 76 18 97.5 °F (36.4 °C) 98 %      MAP       --         Physical Exam    Nursing note and vitals reviewed.  Constitutional: He appears well-developed and well-nourished. He is not diaphoretic. No distress.   HENT:   Head: Normocephalic and atraumatic.   Mouth/Throat: Oropharynx is clear and moist. No oropharyngeal exudate.   Eyes: Conjunctivae and EOM are normal. Pupils are equal, round, and reactive to light. Right eye exhibits no discharge. Left eye exhibits no discharge. No scleral icterus.   Neck: Neck supple. No thyromegaly present. No tracheal deviation present. No JVD present.   Normal range of motion.  Cardiovascular:  Normal rate, regular rhythm and normal heart sounds.     Exam reveals no gallop and no friction rub.       No murmur heard.  Pulmonary/Chest: Breath sounds normal. No respiratory distress. He has no wheezes. He has no rhonchi. He has no rales. He exhibits no tenderness.   Abdominal: Abdomen is soft. Bowel sounds are normal. He exhibits no distension and no mass. There is no abdominal tenderness. There is no rebound and no guarding.   Musculoskeletal:         General: No tenderness or edema. Normal range of motion.      Cervical back: Normal range of motion and  neck supple.     Lymphadenopathy:     He has no cervical adenopathy.   Neurological: He is alert and oriented to person, place, and time. He has normal strength. No cranial nerve deficit.   Skin: Skin is warm and dry. No rash noted. No erythema.   Psychiatric: He has a normal mood and affect. His behavior is normal. Judgment and thought content normal.         ED Course   Procedures  Labs Reviewed   ALCOHOL,MEDICAL (ETHANOL) - Normal   DRUG SCREEN, URINE (BEAKER) - Normal    Narrative:     Cut off concentrations:    Amphetamines - 1000 ng/ml  Barbiturates - 200 ng/ml  Benzodiazepine - 200 ng/ml  Cannabinoids (THC) - 50 ng/ml  Cocaine - 300 ng/ml  Fentanyl - 1.0 ng/ml  MDMA - 500 ng/ml  Opiates - 300 ng/ml   Phencyclidine (PCP) - 25 ng/ml    Specimen submitted for drug analysis and tested for pH and specific gravity in order to evaluate sample integrity. Suspect tampering if specific gravity is <1.003 and/or pH is not within the range of 4.5 - 8.0  False negatives may result form substances such as bleach added to urine.  False positives may result for the presence of a substance with similar chemical structure to the drug or its metabolite.    This test provides only a PRELIMINARY analytical test result. A more specific alternate chemical method must be used in order to obtain a confirmed analytical result. Gas chromatography/mass spectrometry (GC/MS) is the preferred confirmatory method. Other chemical confirmation methods are available. Clinical consideration and professional judgement should be applied to any drug of abuse test result, particularly when preliminary positive results are used.    Positive results will be confirmed only at the physicians request. Unconfirmed screening results are to be used only for medical purposes (treatment).        EXTRA TUBES    Narrative:     The following orders were created for panel order EXTRA TUBES.  Procedure                               Abnormality         Status                      ---------                               -----------         ------                     Light Green Top Hold[[id number]]                            In process                 Lavender Top Hold[[id number]]                               In process                   Please view results for these tests on the individual orders.   LIGHT GREEN TOP HOLD   LAVENDER TOP HOLD     EKG Readings: (Independently Interpreted)   Initial Reading: No STEMI. Rhythm: Normal Sinus Rhythm. Heart Rate: 86. Ectopy: No Ectopy. Conduction: Normal. ST Segments: Normal ST Segments. T Waves: Normal. Axis: Normal. Clinical Impression: Normal Sinus Rhythm     ECG Results              EKG 12-lead (Weakness) Age > 50 (In process)        Collection Time Result Time QRS Duration [facility] QTC Calculation    06/27/24 10:34:19 06/27/24 10:38:03 86 426                     In process by Interface, Lab In [facility] (06/27/24 10:38:11)                   Narrative:    Test Reason : R53.1,    Vent. Rate : 086 BPM     Atrial Rate : 086 BPM     P-R Int : 150 ms          QRS Dur : 086 ms      QT Int : 356 ms       P-R-T Axes : 055 -25 006 degrees     QTc Int : 426 ms    Normal sinus rhythm  Normal ECG  When compared with ECG of 09-JUN-2024 15:33,  Left anterior fascicular block is no longer Present  T wave inversion now evident in Inferior leads    Referred By:             Confirmed By:                                   Imaging Results    None          Medications   magnesium sulfate 2g in water 50mL IVPB (premix) (2 g Intravenous New Bag 6/27/24 1125)   potassium chloride SA CR tablet 40 mEq (40 mEq Oral Given 6/27/24 1126)   potassium chloride 10 mEq in 100 mL IVPB (10 mEq Intravenous New Bag 6/27/24 1157)       11:15 AM D/C instructions:      Drinking beer is the same as drinking other forms of alcohol. You should not drink any at all and please go to the alcohol treatment program of your choice.    Your potassium and magnesium levels continue to be  low. Keep taking the magnesium as previously prescribed and add the potassium as per the new prescription. These things should recover if you can successfully stay away from all beer/ other alcohol.    Have your primary care provider recheck your lab tests in a week.        Medical Decision Making  Problems Addressed:  Hypokalemia: chronic illness or injury  Hypomagnesemia: chronic illness or injury    Risk  Prescription drug management.      Additional MDM:   Differential Diagnosis:   Hypokalemia, hypomagnesemia, other electrolyte ongoing alcohol abuse                                    Clinical Impression:  Final diagnoses:  [R53.1] Weakness  [E87.6] Hypokalemia (Primary)  [E83.42] Hypomagnesemia          ED Disposition Condition    Discharge Stable          ED Prescriptions       Medication Sig Dispense Start Date End Date Auth. Provider    potassium chloride SA (K-DUR,KLOR-CON) 20 MEQ tablet Take 1 tablet (20 mEq total) by mouth 2 (two) times daily. 60 tablet 6/27/2024 -- Charles Pedroza MD          Follow-up Information       Follow up With Specialties Details Why Contact Info    [facility] Dept Emergency Medicine  As needed [address] [facility] [id number] [phone]    Natan Singh MD Internal Medicine Schedule an appointment as soon as possible for a visit   [address] [facility] [id number] [phone]               Charles Pedroza MD  06/27/24 2206

## 2024-06-27 NOTE — TELEPHONE ENCOUNTER
Notified of critical lab results:  Mag 0.9  K 3    Called number on file, patient's sister. Informed sister to reach out patient/inform patient ASAP to go to emergency room for electrolyte repletion.  Sister endorsed understanding, and will make patient aware when soonest available.    Attempted to find patient by bus stop in front of hospital, not present.    Natan Singh MD  Internal Medicine - PGY-2

## 2024-06-27 NOTE — PROGRESS NOTES
"  INTERNAL MEDICINE RESIDENT CLINIC  CLINIC NOTE    Patient Name: Ger Denton  YOB: 1965  Chief Complaint: Constipation     PRESENTING HISTORY   History of Present Illness:  Mr. Ger Denton is a 58 y.o. male w/ PMHx of chronic alcoholism, hepatosteatosis, hypertension, and history of vitamin D deficiency who presents to internal medicine clinic for follow up.     When to ER 6/9/24 for constipation. Was found to be hypokalemic 3 and hypomag 1.1. Prescribed Kcl 10 mEq once daily for 5 days and Mag Oxide 400 mg once daily for 90 days.  Last bowel movement two days ago.  Feels like he has to go now.    Additionally, has mixed up his pills and has assorted them into pill bottles at random.     Last drink date of ER visit 6/9/24. Endorses prior to this drinking 2-3 40 oz beers daily. "3 on a good day".    Following with Dr. Lowe (Psychiatry) for his alcohol use disorder. Currently on Acamprosate 666 mg TID.       Social Hx:  Chronic alcoholism w/ 2-3 40 oz beer daily, last drink 6/9/24.  Lifetime nonsmoker. Denies illicit drug use.      Review of Systems:  12 point review of symptoms negative unless otherwise stated above    PAST HISTORY:     Past Medical History:   Diagnosis Date    Cirrhosis     Hypertension         Past Surgical History:   Procedure Laterality Date    ESOPHAGOGASTRODUODENOSCOPY      none         Family History   Problem Relation Name Age of Onset    Cancer Sister         Social History     Socioeconomic History    Marital status: Single   Tobacco Use    Smoking status: Never     Passive exposure: Past    Smokeless tobacco: Never   Substance and Sexual Activity    Alcohol use: Not Currently     Alcohol/week: 42.0 standard drinks of alcohol     Types: 42 Cans of beer per week     Comment: 1-2 40oz beer/d    Drug use: Not Currently     Types: Marijuana     Comment: states "every now and then"    Sexual activity: Not Currently     Social Determinants of Health     Financial Resource " "Strain: Low Risk  (11/15/2023)    Overall Financial Resource Strain (CARDIA)     Difficulty of Paying Living Expenses: Not hard at all   Food Insecurity: No Food Insecurity (11/15/2023)    Hunger Vital Sign     Worried About Running Out of Food in the Last Year: Never true     Ran Out of Food in the Last Year: Never true   Transportation Needs: No Transportation Needs (11/15/2023)    PRAPARE - Transportation     Lack of Transportation (Medical): No     Lack of Transportation (Non-Medical): No   Physical Activity: Inactive (11/15/2023)    Exercise Vital Sign     Days of Exercise per Week: 0 days     Minutes of Exercise per Session: 0 min   Stress: No Stress Concern Present (11/15/2023)    [employer] [employer] - Occupational Stress Questionnaire     Feeling of Stress : Not at all   Housing Stability: Low Risk  (11/15/2023)    Housing Stability Vital Sign     Unable to Pay for Housing in the Last Year: No     Number of Places Lived in the Last Year: 1     Unstable Housing in the Last Year: No       MEDICATIONS:     Current Outpatient Medications   Medication Instructions    acamprosate (CAMPRAL) 666 mg, Oral, 3 times daily    atorvastatin (LIPITOR) 40 mg, Oral, Daily    carvediloL (COREG) 6.25 mg, Oral, 2 times daily with meals    fluticasone propionate (FLONASE) 50 mcg, Each Nostril, Daily    folic acid (FOLVITE) 1 mg, Oral, Daily    loratadine (CLARITIN) 10 mg, Oral, Daily    magnesium oxide (MAG-OX) 400 mg, Oral, 2 times daily    NIFEdipine (PROCARDIA-XL) 30 mg, Oral, Daily    pantoprazole (PROTONIX) 40 mg, Oral, Daily, 30 min before breakfast    tamsulosin (FLOMAX) 0.4 mg, Oral, Daily    VITAMIN D3 1,000 Units, Oral, Daily        OBJECTIVE:   Vital Signs:  Vitals:    06/27/24 0747   BP: 107/69   Pulse: 92   Resp: (!) 24   Temp: 98.6 °F (37 °C)   TempSrc: Oral   SpO2: 99%   Weight: 92.5 kg (204 lb)   Height: 5' 9" (1.753 m)                Physical Exam:  Physical Exam  Vitals and nursing note " reviewed.   Constitutional:       General: He is not in acute distress.     Appearance: Normal appearance. He is not ill-appearing, toxic-appearing or diaphoretic.      Comments: Non tremulous, calm demeanor.   HENT:      Head: Normocephalic.   Eyes:      General: No scleral icterus.     Extraocular Movements: Extraocular movements intact.      Pupils: Pupils are equal, round, and reactive to light.   Cardiovascular:      Rate and Rhythm: Normal rate and regular rhythm.      Pulses: Normal pulses.      Heart sounds: Normal heart sounds. No murmur heard.  Pulmonary:      Effort: Pulmonary effort is normal. No respiratory distress.      Breath sounds: Normal breath sounds. No wheezing or rales.   Abdominal:      General: Abdomen is flat. There is no distension.      Palpations: Abdomen is soft.      Tenderness: There is no abdominal tenderness. There is no guarding.   Musculoskeletal:         General: No swelling or tenderness. Normal range of motion.      Cervical back: Normal range of motion.      Right lower leg: No edema.      Left lower leg: No edema.   Skin:     General: Skin is warm.      Capillary Refill: Capillary refill takes less than 2 seconds.      Coloration: Skin is not jaundiced or pale.      Findings: No lesion or rash.   Neurological:      General: No focal deficit present.      Mental Status: He is alert and oriented to person, place, and time. Mental status is at baseline.           Laboratory  Lab Results   Component Value Date     (L) 06/09/2024    K 3.0 (L) 06/09/2024    CL 96 (L) 06/09/2024    CO2 19 (L) 06/09/2024    BUN 21.5 06/09/2024    CREATININE 1.42 (H) 06/09/2024    CALCIUM 9.7 06/09/2024    BILIDIR 0.3 07/29/2021    IBILI 0.30 07/29/2021    ALKPHOS 54 06/09/2024    AST 46 (H) 06/09/2024    ALT 25 06/09/2024    MG 1.10 (L) 06/09/2024    PHOS 3.7 05/06/2024        Lab Results   Component Value Date    WBC 6.36 06/09/2024    RBC 4.78 06/09/2024    HGB 14.5 06/09/2024    HCT 40.8 (L)  06/09/2024    MCV 85.4 06/09/2024    MCH 30.3 06/09/2024    MCHC 35.5 06/09/2024    RDW 12.9 06/09/2024     06/09/2024    MPV 11.0 (H) 06/09/2024        Diagnostic Results:      ASSESSMENT & PLAN:     Chronic Alcoholism  Alcohol Withdrawal  Thrombocytopenia  -Will get updated INR, labs - MELD score today; (Last update MELD-Na 6; Child Jean Baptiste Class A (updated 11/6/23); INR 1.0 08/25/2023)  -Last drink 6/9/24;  -Continue Acamprosate (Campral) 666 mg TID   -Continue following with Dr. Lowe for alcohol dependence  - Continue folic acid 1 mg once daily    Hypomagnesemia  Hypokalemia  -Lab update today, plan pending results  -Discovered on ER visit 6/9/24 for constipation; prescribed Kcl 10 mEq x5 days and Mag Oxide 400 mg daily (90 tablets    Hepatic Steatosis  HLD  Cholelithiasis  -Lipid panel 5/2024 not at goal, unsure duration/compliance with Lipitor 40 mg daily  -Continue current regiment for now; update lipid panel after 3 mo consistent Lipitor dosing    CIRRHOSIS COUNSELING:  - strict abstinence of alcohol use  - low sodium (salt) 2000mg per day  - Nutrition: 25-30kcal (calorie per body weight in kilogram) per day  - No need to restrict protein in diet  - High protein diet: 1.2-1.5 gram/kg (protein per body weight in kg) per day to prevent muscle mass loss  - Resistance exercises for muscle strength  - Avoid raw seafoods due to risk of fatal Vibrio vulnificus infection  - Avoid Non-steroidal anti-inflammatory drugs (NSAIDs) such as ibuprofen, Motrin, naproxen, Aleve due to risk of kidney damage  - Can take acetaminophen (tylenol), no more than 2000mg per day  - Compliance with all medications  - Ultrasound or MRI of the liver every 6 months for liver cancer screening  - Upper endoscopy every 1-2 years to screen for varices     Chronic Rhinitis  - Continue Flonase daily    Anemia of Chronic Disease  - H/H stable  -Last anemia profile consistent with diagnosis    Hx of Helicobacter Pylori Infection (Treated;  confirmed eradication)  - Currently stable, denies any ongoing symptoms  - Completed Pylera; H. Pylori Fecal Antigen confirmed eradication 05/09/2023; Stomach, Antrum biopsy 11/28/22 revealing chronic active gastritis and numerous helicobacter-like organisms    HTN  - /69  - Continue Coreg 6.25 mg BID and nifedipine 30 mg XR daily  - Avoid  ARB/ACEi given concerns of liver cirrhosis    Vitamin D Deficiency  - Vitamin D 48.5 08/25/2023, within normal range  - continue Vit D 1000 units daily     Health Maintenance/ Wellness  Varices Screening (EGD): Completed for 11/28/22 by Dr. Green; Unremarkable for varices; H. Pylori positive biopsy on lesion in antrum; treated, eradicated; next due 11/28/2024  Lung Cancer Screening: not indicated, nonsmoker  HIV/Hep screening: nonreactive 11/27/20  Colon Cancer Screening: Cologuard neg 12/23/22; next due 12/23/25    POCT A1c 5 6/27/24    Immunization History   Administered Date(s) Administered    COVID-19, MRNA, LN-S, PF (Pfizer) (Purple Cap) 05/28/2021, 06/17/2021    Pneumococcal Conjugate - 20 Valent 12/12/2022    Td (ADULT) 08/26/2012    Tdap 02/08/2023    Zoster Recombinant 06/24/2022, 09/22/2022         RTC in 3 months    Natan Singh MD  Internal Medicine - PGY-2

## 2024-06-27 NOTE — DISCHARGE INSTRUCTIONS
Drinking beer is the same as drinking other forms of alcohol. You should not drink any at all and please go to the alcohol treatment program of your choice.    Your potassium and magnesium levels continue to be low. Keep taking the magnesium as previously prescribed and add the potassium as per the new prescription. These things should recover if you can successfully stay away from all beer/ other alcohol.    Have your primary care provider recheck your lab tests in a week.

## 2024-07-01 ENCOUNTER — LAB VISIT (OUTPATIENT)
Dept: LAB | Facility: HOSPITAL | Age: 59
End: 2024-07-01
Payer: MEDICAID

## 2024-07-01 DIAGNOSIS — R33.9 URINARY RETENTION WITH INCOMPLETE BLADDER EMPTYING: ICD-10-CM

## 2024-07-01 LAB
BACTERIA #/AREA URNS AUTO: ABNORMAL /HPF
BILIRUB UR QL STRIP.AUTO: NEGATIVE
CLARITY UR: CLEAR
COLOR UR AUTO: YELLOW
GLUCOSE UR QL STRIP: NORMAL
HGB UR QL STRIP: NEGATIVE
HYALINE CASTS #/AREA URNS LPF: ABNORMAL /LPF
KETONES UR QL STRIP: NEGATIVE
LEUKOCYTE ESTERASE UR QL STRIP: NEGATIVE
MUCOUS THREADS URNS QL MICRO: ABNORMAL /LPF
NITRITE UR QL STRIP: NEGATIVE
PH UR STRIP: 5.5 [PH]
PROT UR QL STRIP: NEGATIVE
RBC #/AREA URNS AUTO: ABNORMAL /HPF
SP GR UR STRIP.AUTO: 1.02 (ref 1–1.03)
SQUAMOUS #/AREA URNS LPF: ABNORMAL /HPF
UROBILINOGEN UR STRIP-ACNC: NORMAL
WBC #/AREA URNS AUTO: ABNORMAL /HPF

## 2024-07-01 PROCEDURE — 81001 URINALYSIS AUTO W/SCOPE: CPT

## 2024-08-01 ENCOUNTER — OFFICE VISIT (OUTPATIENT)
Dept: BEHAVIORAL HEALTH | Facility: CLINIC | Age: 59
End: 2024-08-01
Payer: MEDICAID

## 2024-08-01 VITALS
HEIGHT: 69 IN | SYSTOLIC BLOOD PRESSURE: 126 MMHG | DIASTOLIC BLOOD PRESSURE: 86 MMHG | HEART RATE: 78 BPM | WEIGHT: 118.38 LBS | BODY MASS INDEX: 17.53 KG/M2

## 2024-08-01 DIAGNOSIS — F10.20 ALCOHOL USE DISORDER, SEVERE, DEPENDENCE: Primary | ICD-10-CM

## 2024-08-01 PROCEDURE — 99213 OFFICE O/P EST LOW 20 MIN: CPT | Mod: PBBFAC,PN | Performed by: STUDENT IN AN ORGANIZED HEALTH CARE EDUCATION/TRAINING PROGRAM

## 2024-08-01 NOTE — PROGRESS NOTES
"Outpatient Psychiatry Follow-Up Visit    8/1/2024    Clinical Status of Patient:  Outpatient (Ambulatory)    Chief Complaint:  Ger Denton is a 58 y.o. male who presents today for follow-up of substance problems. Patient last seen for follow-up on 4/23/2024. Met with patient.      Interval History and Content of Current Session:  Interim Events/Subjective Report/Content of Current Session:   Pt reports doing "ok"  overall.  Notes continues to report decreasing his alcohol consumption, drinking only on weekends, drinking 1 25oz beer per day on the weekend.  Unsure if he is ready to continue deescalating his use.  Says that he is trying to decrease his use due to potential negative effects on his body/health.  Denies continued cravings for alcohol.  Notes poor memory.  Reports good mood, denies currently feeling depressed, fair anxiety.  Sleeping fair, difficulty with staying asleep.  Appetite decreased, weight increased slightly.  Energy "alright", motivation good.  Denies irritability, denies hopelessness.  Denies SI/HI/AVH/paranoia, denies plan or desire for self harm or harm to others.  Denies SE from current regimen Denies somatic complaints.   Pt voices desire to adjust regimen to address ongoing symptoms.      Psychiatric Review of Systems-is patient experiencing or having changes in  Integrated into HPI above.     Review of Systems   PSYCHIATRIC: Pertinant items are noted in the narrative.  CONSTITUTIONAL: No weight gain or loss.  MUSCULOSKELETAL: No pain or stiffness of the joints.  NEUROLOGIC: No weakness, sensory changes, seizures, confusion, memory loss, tremor or other abnormal movements.  CARDIAC: No CP, no palpitations  RESPIRATORY: No shortness of breath.  CARDIOVASCULAR: No tachycardia or chest pain.  GASTROINTESTINAL: No nausea, vomiting, pain, constipation or diarrhea.    Past Medical, Family and Social History: The patient's past medical, family and social history have been reviewed and updated as " "appropriate within the electronic medical record - see encounter notes.    Compliance: fair to poor    Side effects: denies    Risk Parameters:  Patient reports no suicidal ideation  Patient reports no homicidal ideation  Patient reports no self-injurious behavior  Patient reports no violent behavior    Exam (detailed: at least 9 elements; comprehensive: all 15 elements)   Constitutional  Vitals:  Most recent vital signs, dated less than 90 days prior to this appointment, were reviewed.     Vitals:    08/01/24 1212   BP: 126/86   Pulse: 78   Weight: 53.7 kg (118 lb 6.4 oz)   Height: 5' 9" (1.753 m)        General:   Constitutional: No acute distress, appears stated age, casually dressed    Neurologic:   Motor: moves all extremities spontaneously and without difficulty, no abnormal involuntary movements observed  Gait: normal gait and station    Mental status examination:   Appearance: appears stated age, casually dressed, no acute distress  Behavior: unremarkable for situation, calm and cooperative  Mood: "ok"  Affect: mood congruent and constricted  Thought process: linear and goal directed  Thought content: no plan or desire for self harm or harm to others, denies paranoia, no delusional ideation volunteered  Perceptions: denies hallucinations or other altered perceptions  Associations: appropriate for conversation  Orientation: oriented to day of week, month, year, location, and situation  Language: English, fluid  Attention: able to attend to interview  Insight: fair  Judgement: fair    PHQ9:  Over the last two weeks how often have you been bothered by little interest or pleasure in doing things: 0  Over the last two weeks how often have you been bothered by feeling down, depressed or hopeless: 0  PHQ-2 Total Score: 0  PHQ-9 Score: 0  PHQ-9 Interpretation: Minimal or None        4/23/2024    12:32 PM 2/23/2024     9:46 AM   GAD7   1. Feeling nervous, anxious, or on edge? 1 2   2. Not being able to stop or " control worrying? 1 2   3. Worrying too much about different things? 1 2   4. Trouble relaxing? 0 2   5. Being so restless that it is hard to sit still? 0 2   6. Becoming easily annoyed or irritable? 0 0   7. Feeling afraid as if something awful might happen? 0 2   8. If you checked off any problems, how difficult have these problems made it for you to do your work, take care of things at home, or get along with other people? 0 2   CAMPBELL-7 Score 3 12     Assessment and Diagnosis   Status/Progress: Based on the examination today, the patient's problem(s) is/are improving.  New problems have not been presented today.   Co-morbidities are complicating management of the primary condition.  Number of separate conditions addressed during today's visit: 1 (alcohol use disorder fair control).  Medication management: Yes: Deciding to continue a pre-existing prescription.  Are referral(s) being ordered today: No.  Complexity (level) of medical decision making employed in the encounter: MODERATE.    General Impression:    ICD-10-CM ICD-9-CM   1. Alcohol use disorder, severe, dependence  F10.20 303.90     Intervention/Counseling/Treatment Plan   Continue acamprosate to 666mg tid  Patient continues to defer residential rehab for substance treatment  Discussed concept of quit date, patient has difficult time comprehending this concept  Patient notes significant decrease in his consumption from prior, will recheck PEth, PEth likely to be negative if patient has decreased his consumption as much as he reports  PEth + at initial visit  No need for PEC as pt is not an imminent danger to self or others or gravely disabled due to acute psychiatric illness  Discussed that pt should either call clinic for psychiatric crisis symptoms or present to nearest emergency room    Discussed with patient informed consent including diagnosis, risks and benefits of proposed treatment above vs. alternative treatments vs. no treatment, as well as  serious and common side effects of these treatments, and the inherent unpredictability of individual responses to these treatments. The patient expresses understanding of the above and displays the capacity to agree with this current plan. Patient also agrees that, currently, the benefits outweigh the risks and would like to pursue treatment at this time, and had no other questions.    Instructions:  Take all medications as prescribed.    Abstain from recreational drugs and alcohol.  Present to ED or call 911 for SI/HI plan or intent, psychosis, or medical emergency.    Return to Clinic: Follow up in about 3 months (around 11/1/2024).    Total time:   The total time for services performed on the date of the encounter (including review of prior visit notes, review of notes from other providers, review of results from laboratory/imaging studies, face-to-face time with patient, and time spent on other activities directly related to patient care): 24 minutes.    Natan Lowe MD  [facility]

## 2024-09-17 ENCOUNTER — OFFICE VISIT (OUTPATIENT)
Dept: INTERNAL MEDICINE | Facility: CLINIC | Age: 59
End: 2024-09-17
Payer: MEDICAID

## 2024-09-17 VITALS
HEIGHT: 69 IN | TEMPERATURE: 98 F | BODY MASS INDEX: 30.96 KG/M2 | OXYGEN SATURATION: 100 % | WEIGHT: 209 LBS | SYSTOLIC BLOOD PRESSURE: 131 MMHG | RESPIRATION RATE: 20 BRPM | DIASTOLIC BLOOD PRESSURE: 82 MMHG | HEART RATE: 82 BPM

## 2024-09-17 DIAGNOSIS — M54.16 LUMBAR BACK PAIN WITH RADICULOPATHY AFFECTING LOWER EXTREMITY: ICD-10-CM

## 2024-09-17 DIAGNOSIS — F10.10 ALCOHOL ABUSE: ICD-10-CM

## 2024-09-17 DIAGNOSIS — E83.42 HYPOMAGNESEMIA: Primary | ICD-10-CM

## 2024-09-17 PROCEDURE — 99215 OFFICE O/P EST HI 40 MIN: CPT | Mod: PBBFAC

## 2024-09-17 RX ORDER — DISULFIRAM 250 MG/1
250 TABLET ORAL DAILY
Qty: 90 TABLET | Refills: 3 | Status: SHIPPED | OUTPATIENT
Start: 2024-09-17 | End: 2025-09-17

## 2024-09-17 NOTE — PROGRESS NOTES
I have reviewed and concur with the resident's history, physical, assessment, and plan.  I have discussed with him all issues related to the diagnosis, workup and treatment plan. Care provided as reasonable and necessary.    Bob Begum MD  [facility]

## 2024-09-17 NOTE — PROGRESS NOTES
"  INTERNAL MEDICINE RESIDENT CLINIC  CLINIC NOTE    Patient Name: Ger Denton  YOB: 1965  Chief Complaint: Extremity Weakness (Patient c/o weakness to lower extremities that started about 2 months ago. )     PRESENTING HISTORY   History of Present Illness:  Mr. Ger Denton is a 59 y.o. male w/ PMHx of chronic alcoholism, hepatosteatosis, hypertension, and history of vitamin D deficiency who presents to internal medicine clinic for follow up.     Patient endorsing reduction from 4-5 40 oz beers daily to now only on weekends. Endorses no relief of alcohol cravings with acamprosate 666 mg TID. Endorses complaince (taking 2 pills 3x daily). Interested in antabuse today for additional aid.     Additionally complaining of lower back paraspinal pain was radiation of pain into hips and knees. Started roughly 3 months ago, no inciting event or hx of back pain. Associated symptoms BLE weakness and close falls s/t knees buckling.      Last visit 6/27/24:  When to ER 6/9/24 for constipation. Was found to be hypokalemic 3 and hypomag 1.1. Prescribed Kcl 10 mEq once daily for 5 days and Mag Oxide 400 mg once daily for 90 days.  Last bowel movement two days ago.  Feels like he has to go now.    Additionally, has mixed up his pills and has assorted them into pill bottles at random.     Last drink date of ER visit 6/9/24. Endorses prior to this drinking 2-3 40 oz beers daily. "3 on a good day".    Following with Dr. Lowe (Psychiatry) for his alcohol use disorder. Currently on Acamprosate 666 mg TID.       Social Hx:  Chronic alcoholism w/ 4-5 40 oz beer reduction from daily to only on weekends; reduction from daily reported at last visit.  Lifetime nonsmoker. Denies illicit drug use.      Review of Systems:  12 point review of symptoms negative unless otherwise stated above    PAST HISTORY:     Past Medical History:   Diagnosis Date    Cirrhosis     Hypertension         Past Surgical History:   Procedure " "Laterality Date    ESOPHAGOGASTRODUODENOSCOPY      none         Family History   Problem Relation Name Age of Onset    Cancer Sister         Social History     Socioeconomic History    Marital status: Single   Tobacco Use    Smoking status: Never     Passive exposure: Past    Smokeless tobacco: Never   Substance and Sexual Activity    Alcohol use: Yes     Alcohol/week: 42.0 standard drinks of alcohol     Types: 42 Cans of beer per week     Comment: 1-2 40oz beer/d    Drug use: Yes     Types: Marijuana     Comment: states "every now and then"    Sexual activity: Not Currently     Social Determinants of Health     Financial Resource Strain: Low Risk  (11/15/2023)    Overall Financial Resource Strain (CARDIA)     Difficulty of Paying Living Expenses: Not hard at all   Food Insecurity: No Food Insecurity (11/15/2023)    Hunger Vital Sign     Worried About Running Out of Food in the Last Year: Never true     Ran Out of Food in the Last Year: Never true   Transportation Needs: No Transportation Needs (11/15/2023)    PRAPARE - Transportation     Lack of Transportation (Medical): No     Lack of Transportation (Non-Medical): No   Physical Activity: Inactive (11/15/2023)    Exercise Vital Sign     Days of Exercise per Week: 0 days     Minutes of Exercise per Session: 0 min   Stress: No Stress Concern Present (11/15/2023)    [employer] - Occupational Stress Questionnaire     Feeling of Stress : Not at all   Housing Stability: Low Risk  (11/15/2023)    Housing Stability Vital Sign     Unable to Pay for Housing in the Last Year: No     Number of Places Lived in the Last Year: 1     Unstable Housing in the Last Year: No       MEDICATIONS:     Current Outpatient Medications   Medication Instructions    acamprosate (CAMPRAL) 666 mg, Oral, 3 times daily    atorvastatin (LIPITOR) 40 mg, Oral, Daily    carvediloL (COREG) 6.25 mg, Oral, 2 times daily with meals    cholecalciferol (vitamin D3) (VITAMIN D3) " "1,000 Units, Oral, Daily    disulfiram (ANTABUSE) 250 mg, Oral, Daily    fluticasone propionate (FLONASE) 50 mcg, Each Nostril, Daily    folic acid (FOLVITE) 1 mg, Oral, Daily    lactulose (CHRONULAC) 15 g, Oral, 3 times daily    loratadine (CLARITIN) 10 mg, Oral, Daily    magnesium oxide (MAG-OX) 400 mg, Oral, 2 times daily    NIFEdipine (PROCARDIA-XL) 30 mg, Oral, Daily    pantoprazole (PROTONIX) 40 mg, Oral, Daily, 30 min before breakfast    polyethylene glycol (GLYCOLAX) 17 g, Oral, Daily    potassium chloride SA (K-DUR,KLOR-CON) 20 MEQ tablet 20 mEq, Oral, 2 times daily    senna-docusate 8.6-50 mg (SENNA WITH DOCUSATE SODIUM) 8.6-50 mg per tablet 1 tablet, Oral, 2 times daily PRN    tamsulosin (FLOMAX) 0.4 mg, Oral, Daily        OBJECTIVE:   Vital Signs:  Vitals:    09/17/24 0936   BP: 131/82   Pulse: 82   Resp: 20   Temp: 98.2 °F (36.8 °C)   TempSrc: Oral   SpO2: 100%   Weight: 94.8 kg (209 lb)   Height: 5' 9" (1.753 m)                Physical Exam:  Physical Exam  Vitals and nursing note reviewed.   Constitutional:       General: He is not in acute distress.     Appearance: Normal appearance. He is not ill-appearing, toxic-appearing or diaphoretic.      Comments: Non tremulous, calm demeanor.   HENT:      Head: Normocephalic.   Eyes:      General: No scleral icterus.     Extraocular Movements: Extraocular movements intact.      Pupils: Pupils are equal, round, and reactive to light.   Cardiovascular:      Rate and Rhythm: Normal rate and regular rhythm.      Pulses: Normal pulses.      Heart sounds: Normal heart sounds. No murmur heard.  Pulmonary:      Effort: Pulmonary effort is normal. No respiratory distress.      Breath sounds: Normal breath sounds. No wheezing or rales.   Abdominal:      General: Abdomen is flat. There is no distension.      Palpations: Abdomen is soft.      Tenderness: There is no abdominal tenderness. There is no guarding.   Musculoskeletal:         General: No swelling or tenderness. " Normal range of motion.      Cervical back: Normal range of motion.      Right lower leg: No edema.      Left lower leg: No edema.      Comments: LT quadriceps musculature notably atrophied when compared to RT; no appreciable weakness with physical exam.    Tenderness with palpation on RT sided paraspinal L spine. Pain reproduced with facet stressing bilaterally.   Skin:     General: Skin is warm.      Capillary Refill: Capillary refill takes less than 2 seconds.      Coloration: Skin is not jaundiced or pale.      Findings: No lesion or rash.   Neurological:      General: No focal deficit present.      Mental Status: He is alert and oriented to person, place, and time. Mental status is at baseline.           Laboratory  Lab Results   Component Value Date     06/27/2024    K 3.0 (L) 06/27/2024     06/27/2024    CO2 25 06/27/2024    BUN 8.4 06/27/2024    CREATININE 0.89 06/27/2024    CALCIUM 9.5 06/27/2024    BILIDIR 0.3 07/29/2021    IBILI 0.30 07/29/2021    ALKPHOS 44 06/27/2024    AST 23 06/27/2024    ALT 16 06/27/2024    MG 0.90 (LL) 06/27/2024    PHOS 2.9 06/27/2024        Lab Results   Component Value Date    WBC 6.36 06/09/2024    RBC 4.78 06/09/2024    HGB 14.5 06/09/2024    HCT 40.8 (L) 06/09/2024    MCV 85.4 06/09/2024    MCH 30.3 06/09/2024    MCHC 35.5 06/09/2024    RDW 12.9 06/09/2024     06/09/2024    MPV 11.0 (H) 06/09/2024         Latest Reference Range & Units Most Recent 11/20/23 08:32   Cholesterol Total <=200 mg/dL 200  5/6/24 12:33 206 (H)   HDL 35 - 60 mg/dL 59  5/6/24 12:33 58   Total Cholesterol/HDL Ratio 0 - 5  3  5/6/24 12:33 4   Triglycerides 34 - 140 mg/dL 49  5/6/24 12:33 77   LDL Cholesterol 50.00 - 140.00 mg/dL 131.00  5/6/24 12:33 133.00   Very Low Density Lipoprotein  10  5/6/24 12:33 15   (H): Data is abnormally high    Diagnostic Results:      ASSESSMENT & PLAN:     Chronic Alcoholism  Alcohol Withdrawal  Thrombocytopenia  -Will get updated INR, labs - MELD score  today; (Last update MELD-Na 6; Child Jean Baptiste Class A (updated 11/6/23); INR 1.0 08/25/2023)  -Follows with Dr. Lowe Psychiatry for assistance with alcohol dependence  -c/w Acamprosate (Campral) 666 mg TID and folic acid 1 mg once daily  -Started on Antabuse 250 mg daily per patient request; side effect profile explained to patient, endorsed understanding    Hypomagnesemia  Hypokalemia  -repeat lab work pending  -continues on Mag Oxide 400 mg once daily and Kcl 20 mEq BID    Lower Back Pain (concern for w/ radiculopathy)  Associated Bilateral Hip and Knee Pain  -XR L-spine, bilateral knees and hips ordered  -Ordered EMG study 4 extremities  -Referred to Physical Therapy    Hepatic Steatosis  HLD  Cholelithiasis  -Lipid panel minimal improvement  -check for compliance Lipitor 40 mg daily at next visit      CIRRHOSIS COUNSELING:  - strict abstinence of alcohol use  - low sodium (salt) 2000mg per day  - Nutrition: 25-30kcal (calorie per body weight in kilogram) per day  - No need to restrict protein in diet  - High protein diet: 1.2-1.5 gram/kg (protein per body weight in kg) per day to prevent muscle mass loss  - Resistance exercises for muscle strength  - Avoid raw seafoods due to risk of fatal Vibrio vulnificus infection  - Avoid Non-steroidal anti-inflammatory drugs (NSAIDs) such as ibuprofen, Motrin, naproxen, Aleve due to risk of kidney damage  - Can take acetaminophen (tylenol), no more than 2000mg per day  - Compliance with all medications  - Ultrasound or MRI of the liver every 6 months for liver cancer screening  - Upper endoscopy every 1-2 years to screen for varices     Chronic Rhinitis  - Continue Flonase daily    Anemia of Chronic Disease  - H/H stable  -Last anemia profile consistent with diagnosis    Hx of Helicobacter Pylori Infection (Treated; confirmed eradication)  - Currently stable, denies any ongoing symptoms  - Completed Pylera; H. Pylori Fecal Antigen confirmed eradication 05/09/2023; Stomach,  Antrum biopsy 11/28/22 revealing chronic active gastritis and numerous helicobacter-like organisms    HTN  - BP well controlled; 131/82, bpm 82  - Continue Coreg 6.25 mg BID and nifedipine 30 mg XR daily  - Avoid  ARB/ACEi given concerns of liver cirrhosis    Vitamin D Deficiency  - Vitamin D 48.5 08/25/2023, within normal range  - continue Vit D 1000 units daily     Health Maintenance/ Wellness  Varices Screening (EGD): Completed for 11/28/22 by Dr. Green; Unremarkable for varices; H. Pylori positive biopsy on lesion in antrum; treated, eradicated; next due 11/28/2024  Lung Cancer Screening: not indicated, nonsmoker  HIV/Hep screening: nonreactive 11/27/20  Colon Cancer Screening: Cologuard neg 12/23/22; next due 12/23/25    POCT A1c 5 6/27/24    Immunization History   Administered Date(s) Administered    COVID-19, MRNA, LN-S, PF (Pfizer) (Purple Cap) 05/28/2021, 06/17/2021    Pneumococcal Conjugate - 20 Valent 12/12/2022    Td (ADULT) 08/26/2012    Tdap 02/08/2023    Zoster Recombinant 06/24/2022, 09/22/2022         RTC in 3 months    Natan Singh MD  Internal Medicine - PGY-3

## 2024-09-24 ENCOUNTER — HOSPITAL ENCOUNTER (OUTPATIENT)
Dept: RADIOLOGY | Facility: HOSPITAL | Age: 59
Discharge: HOME | End: 2024-09-24
Attending: INTERNAL MEDICINE
Payer: MEDICAID

## 2024-09-24 DIAGNOSIS — M54.16 LUMBAR BACK PAIN WITH RADICULOPATHY AFFECTING LOWER EXTREMITY: ICD-10-CM

## 2024-09-24 PROCEDURE — 72114 X-RAY EXAM L-S SPINE BENDING: CPT | Mod: TC

## 2024-09-24 PROCEDURE — 73562 X-RAY EXAM OF KNEE 3: CPT | Mod: TC,50

## 2024-09-24 PROCEDURE — 73521 X-RAY EXAM HIPS BI 2 VIEWS: CPT | Mod: TC

## 2024-10-28 DIAGNOSIS — F10.20 ALCOHOL USE DISORDER, SEVERE, DEPENDENCE: ICD-10-CM

## 2024-10-28 DIAGNOSIS — S32.000S COMPRESSION FRACTURE OF LUMBAR VERTEBRA, UNSPECIFIED LUMBAR VERTEBRAL LEVEL, SEQUELA: Primary | ICD-10-CM

## 2024-10-28 DIAGNOSIS — E55.9 VITAMIN D DEFICIENCY: ICD-10-CM

## 2024-11-01 ENCOUNTER — OFFICE VISIT (OUTPATIENT)
Dept: BEHAVIORAL HEALTH | Facility: CLINIC | Age: 59
End: 2024-11-01
Payer: MEDICAID

## 2024-11-01 VITALS
BODY MASS INDEX: 31.2 KG/M2 | OXYGEN SATURATION: 99 % | WEIGHT: 211.31 LBS | DIASTOLIC BLOOD PRESSURE: 88 MMHG | TEMPERATURE: 98 F | SYSTOLIC BLOOD PRESSURE: 137 MMHG | HEART RATE: 103 BPM

## 2024-11-01 DIAGNOSIS — F10.20 ALCOHOL USE DISORDER, SEVERE, DEPENDENCE: Primary | ICD-10-CM

## 2024-11-01 PROCEDURE — 99213 OFFICE O/P EST LOW 20 MIN: CPT | Mod: PBBFAC,PN | Performed by: STUDENT IN AN ORGANIZED HEALTH CARE EDUCATION/TRAINING PROGRAM

## 2024-11-01 RX ORDER — NALTREXONE HYDROCHLORIDE 50 MG/1
50 TABLET, FILM COATED ORAL DAILY
Qty: 30 TABLET | Refills: 0 | Status: SHIPPED | OUTPATIENT
Start: 2024-11-01 | End: 2024-12-01

## 2024-11-11 ENCOUNTER — OFFICE VISIT (OUTPATIENT)
Dept: GASTROENTEROLOGY | Facility: CLINIC | Age: 59
End: 2024-11-11
Payer: MEDICAID

## 2024-11-11 VITALS
HEART RATE: 78 BPM | OXYGEN SATURATION: 100 % | TEMPERATURE: 98 F | RESPIRATION RATE: 16 BRPM | BODY MASS INDEX: 29.77 KG/M2 | SYSTOLIC BLOOD PRESSURE: 124 MMHG | WEIGHT: 201 LBS | DIASTOLIC BLOOD PRESSURE: 82 MMHG | HEIGHT: 69 IN

## 2024-11-11 DIAGNOSIS — K70.30 ALCOHOLIC CIRRHOSIS OF LIVER WITHOUT ASCITES: Primary | ICD-10-CM

## 2024-11-11 PROCEDURE — 99214 OFFICE O/P EST MOD 30 MIN: CPT | Mod: PBBFAC | Performed by: STUDENT IN AN ORGANIZED HEALTH CARE EDUCATION/TRAINING PROGRAM

## 2024-11-11 NOTE — PROGRESS NOTES
Subjective     Patient ID: Ger Denton is a 59 y.o. male.    Chief Complaint: Alcoholic Cirrhosis (No complaints.  Still drinking.)    58-year-old male, history of profound alcoholism, presenting to clinic today for initial appointment with me.  Was last seen by his PCP in April 2022, right personally staffed the resident, he at that point had stated he was drinking a 40 oz of beer a day, that it was affecting his lifestyle relationships with others.  Currently unemployed lives with his sister.  He is currently taking disulfiram for and states he has cut back his drinking to a 6 pack on Saturdays and Sundays where he does not take his aforementioned medication.  He recently had an ER visit June 2022 for volume depletion SEUN, was given IV fluids and discharged, however his lab work at that time it showed his platelets had decreased from a baseline of around 123, now at 76.  I had a kelsey discussion with him today in the room but he is already showing signs of decompensation with decreased platelet count now 76, and that today we need to get updated MELD labs to further ascertain his extent of decompensation.  He denies any melena, hematochezia, hematemesis, or jaundice.  Denies any abdominal lower extremity swelling.  He is overdue for updated EGD, on abdominal ultrasound his last abdominal ultrasound performed November 2021 showed hepatic steatosis.  His last MELD of June 2021 was 11, child Jean Baptiste score class A. He is currently taking diclofenac for reasons unknown, discussed with him will be stopping this medication today.       10/31/22:  No complaints today, states states he still drinks 1 beer on occasion maybe 1 beer per week.  Last saw PCP in September, already with much improvement in his laboratory findings including his platelet count was now back to 209.  He is still taking disulfiram.  Did not get his updated ultrasound of the abdomen for HCC screening will reorder.  Current MELD is 9, Child Jean Baptiste class a.   Denies any melena, hematochezia, jaundice, hematemesis.     5/1/23:  Relapsed, now drinking 1-240 oz beers per day, previously had actually been improving and maintain abstinence from alcohol when I last saw him in October, his platelet count had been improving, his most recent ultrasound done in February can actually shown marked improvement in his steatosis, and as far as his EGD that was performed November 2022, he had a normal esophagus and duodenum.  However his antrum was biopsied the did show positive for H pylori he completed 10 day course of Pylera.  We will need to repeat his stool antigen.  Does need up-to-date MELD labs as well.  Discussed with him at length, with his current relapse, he states he will attempt to try and maintain abstinence again as he had done last year given his marked improvement overall at that time.  He sees his PCP, Dr. Singh tomorrow.  Denies any melena, hematochezia, jaundice.     11/6/23:  Still drinking 1-2x 40 oz beers per day, was prescribed naltrexone by PCP.  States he is had difficulty standing for prolonged periods of time, does not exercise frequently, has difficulty with transportation.  Repeated his stool antigen to determine eradication of H pylori, stool antigen was negative.  Updated MELD is 6, Child Jean Baptiste class a.  Up-to-date on variceal screening next will be due November 2024.  He no showed it seems that his last sonogram appointment, he needs updated HCC screening.  Denies any melena, hematochezia, jaundice, hematemesis.       5/6/24:  Following Psychiatry outpatient, Dr. Lowe, recently prescribed acamprosate and has been uptitrated to 666 mg t.i.d..  He states to me he did not up titrate his medications yet as he was out of town for a couple of weeks,  was at a wedding.  Unfortunately still consuming alcohol,  had a beer last night and had beer at a wedding he attended out of town.  His primary care physician at ordered MELD labs for today,  sodium 139, creatinine 1.02, albumin 3.7, bilirubin 0.4, awaiting INR to calculate MELD.  Denies any melena, hematochezia, jaundice, hematemesis, lower extremity swelling.  HCC screening was updated November 2023 see results below.  Due for repeat endoscopy November of this year.  Phosphatidyl ethanol level was 972 on 03/01/2024.    Today's visit:  Complaining of lower back pain radiating down mainly his right leg down to his knee, saw his PCP back in September endorsed same complaint, x-rays at that time suspicious for a compression fracture in T10 and L1, he states he has been taking a leave at times but this is not help with his pain.  Seems workup ongoing for osteoporosis as well as a DEXA has been placed.  He denies bowel or bladder incontinence, and denies any melena or hematochezia.  Recently saw Psychiatry 11/1 had issues taking acamprosate t.i.d. so as switch to naltrexone 50 mg daily that time, he was back to drinking proximally more than 20 drinks per week.  States since starting naltrexone he only had 3 drinks in total last week.  Endorses 1 bowel movement per day, soft, non straining.  Did not complete his updated abdominal ultrasound for HCC screening.  Up-to-date on MELD labs.  Review of Systems   Constitutional: Negative.    HENT: Negative.     Eyes: Negative.    Respiratory: Negative.     Cardiovascular: Negative.    Gastrointestinal: Negative.    Endocrine: Negative.    Genitourinary: Negative.    Musculoskeletal:  Positive for back pain.   Integumentary:  Negative.   Allergic/Immunologic: Negative.    Neurological: Negative.    Hematological: Negative.    Psychiatric/Behavioral: Negative.          Objective   Vitals:    11/11/24 1308   BP: 124/82   Pulse: 78   Resp: 16   Temp: 97.5 °F (36.4 °C)       Physical Exam  Constitutional:       Appearance: Normal appearance. He is obese.   HENT:      Head: Normocephalic and atraumatic.   Eyes:      Conjunctiva/sclera: Conjunctivae normal.      Pupils:  Pupils are equal, round, and reactive to light.   Cardiovascular:      Rate and Rhythm: Normal rate and regular rhythm.      Pulses: Normal pulses.      Heart sounds: Normal heart sounds.   Pulmonary:      Effort: Pulmonary effort is normal.      Breath sounds: Normal breath sounds.   Abdominal:      General: Abdomen is flat. Bowel sounds are normal.      Palpations: Abdomen is soft.   Skin:     General: Skin is warm and dry.   Neurological:      General: No focal deficit present.      Mental Status: He is alert and oriented to person, place, and time. Mental status is at baseline.        Assessment and Plan     1. Alcoholic cirrhosis of liver without ascites  -     US Abdomen Limited_Liver; Future; Expected date: 11/18/2024        Background:  Alcohol:  Yes, still drinking beer , down to 3 drinks per week.     Tobacco: yes     Liver disease: ETOH     MELD-Na: 7  Child-Jean Baptiste Class: 5A     Transplant: not under evaluation, low MELD     Cirrhosis is decompensated with:     Ascites: no  Spontaneous bacterial peritonitis: No  Hepatic Encephalopathy: No  Hepatocellular carcinoma: No  Hepatorenal syndrome: No  Hyponatremia: yes  Muscle wasting: No  Portal vein thrombosis: No     Screening:  Last EGD:  November 28, 2022: Normal esophagus and duodenum.  Antrum revealed evidence of gastritis was biopsied, positive for Helicobacter pylori.        Last imaging study:  Abd US 11/20/23:  Hepatic steatosis, no hepatic masses seen.     CIRRHOSIS COUNSELING:  - strict abstinence of alcohol use  - low sodium (salt) 2000mg per day  - Nutrition: 25-30kcal (calorie per body weight in kilogram) per day  - No need to restrict protein in diet  - High protein diet: 1.2-1.5 gram/kg (protein per body weight in kg) per day to prevent muscle mass loss  - Resistance exercises for muscle strength  - Avoid raw seafoods due to risk of fatal Vibrio vulnificus infection  - Avoid Non-steroidal anti-inflammatory drugs (NSAIDs) such as ibuprofen,  Motrin, naproxen, Aleve due to risk of kidney damage  - Can take acetaminophen (tylenol), no more than 2000mg per day  - Compliance with all medications  - Ultrasound or MRI of the liver every 6 months for liver cancer screening  - Upper endoscopy every 1-2 years to screen for varices      Six-month follow-up, replaced orders for abdominal ultrasound for HCC screening, MELD labs up-to-date.  Counseled on NSAID use in the setting of cirrhotic liver disease and to avoid.  We will contact his PCP today in regards to his back pain, as he may need MRI.  Agree with osteoporosis workup with DEXA, needs vitamin-D level checked has been a year since last update.    Follow up in about 6 months (around 5/11/2025).

## 2024-11-12 DIAGNOSIS — M54.9 DORSALGIA, UNSPECIFIED: Primary | ICD-10-CM

## 2024-12-03 ENCOUNTER — HOSPITAL ENCOUNTER (OUTPATIENT)
Dept: RADIOLOGY | Facility: HOSPITAL | Age: 59
Discharge: HOME | End: 2024-12-03
Payer: MEDICAID

## 2024-12-03 ENCOUNTER — HOSPITAL ENCOUNTER (OUTPATIENT)
Dept: RADIOLOGY | Facility: HOSPITAL | Age: 59
Discharge: HOME | End: 2024-12-03
Attending: STUDENT IN AN ORGANIZED HEALTH CARE EDUCATION/TRAINING PROGRAM
Payer: MEDICAID

## 2024-12-03 DIAGNOSIS — S32.000S COMPRESSION FRACTURE OF LUMBAR VERTEBRA, UNSPECIFIED LUMBAR VERTEBRAL LEVEL, SEQUELA: ICD-10-CM

## 2024-12-03 DIAGNOSIS — E55.9 VITAMIN D DEFICIENCY: ICD-10-CM

## 2024-12-03 DIAGNOSIS — F10.20 ALCOHOL USE DISORDER, SEVERE, DEPENDENCE: ICD-10-CM

## 2024-12-03 DIAGNOSIS — K70.30 ALCOHOLIC CIRRHOSIS OF LIVER WITHOUT ASCITES: ICD-10-CM

## 2024-12-03 PROCEDURE — 77080 DXA BONE DENSITY AXIAL: CPT | Mod: TC

## 2024-12-03 PROCEDURE — 76705 ECHO EXAM OF ABDOMEN: CPT | Mod: TC

## 2024-12-06 ENCOUNTER — HOSPITAL ENCOUNTER (EMERGENCY)
Facility: HOSPITAL | Age: 59
Discharge: HOME | End: 2024-12-06
Attending: EMERGENCY MEDICINE
Payer: MEDICAID

## 2024-12-06 VITALS
HEART RATE: 95 BPM | BODY MASS INDEX: 29.3 KG/M2 | DIASTOLIC BLOOD PRESSURE: 82 MMHG | RESPIRATION RATE: 18 BRPM | TEMPERATURE: 98 F | OXYGEN SATURATION: 98 % | WEIGHT: 198.44 LBS | SYSTOLIC BLOOD PRESSURE: 122 MMHG

## 2024-12-06 DIAGNOSIS — E83.42 HYPOMAGNESEMIA: Primary | ICD-10-CM

## 2024-12-06 DIAGNOSIS — F19.10 SUBSTANCE ABUSE: ICD-10-CM

## 2024-12-06 DIAGNOSIS — R00.0 RAPID HEART BEAT: ICD-10-CM

## 2024-12-06 DIAGNOSIS — F41.9 ANXIETY: ICD-10-CM

## 2024-12-06 LAB
ALBUMIN SERPL-MCNC: 3.8 G/DL (ref 3.5–5)
ALBUMIN/GLOB SERPL: 0.8 RATIO (ref 1.1–2)
ALP SERPL-CCNC: 54 UNIT/L (ref 40–150)
ALT SERPL-CCNC: 80 UNIT/L (ref 0–55)
AMPHET UR QL SCN: POSITIVE
ANION GAP SERPL CALC-SCNC: 14 MEQ/L
AST SERPL-CCNC: 369 UNIT/L (ref 5–34)
BACTERIA #/AREA URNS AUTO: ABNORMAL /HPF
BARBITURATE SCN PRESENT UR: NEGATIVE
BASOPHILS # BLD AUTO: 0.02 X10(3)/MCL
BASOPHILS NFR BLD AUTO: 0.3 %
BENZODIAZ UR QL SCN: NEGATIVE
BILIRUB SERPL-MCNC: 1.9 MG/DL
BILIRUB UR QL STRIP.AUTO: NEGATIVE
BUN SERPL-MCNC: 17.6 MG/DL (ref 8.4–25.7)
CALCIUM SERPL-MCNC: 9.8 MG/DL (ref 8.4–10.2)
CANNABINOIDS UR QL SCN: POSITIVE
CHLORIDE SERPL-SCNC: 99 MMOL/L (ref 98–107)
CLARITY UR: ABNORMAL
CO2 SERPL-SCNC: 22 MMOL/L (ref 22–29)
COCAINE UR QL SCN: NEGATIVE
COLOR UR AUTO: ABNORMAL
CREAT SERPL-MCNC: 1.05 MG/DL (ref 0.72–1.25)
CREAT/UREA NIT SERPL: 17
EOSINOPHIL # BLD AUTO: 0.1 X10(3)/MCL (ref 0–0.9)
EOSINOPHIL NFR BLD AUTO: 1.3 %
ERYTHROCYTE [DISTWIDTH] IN BLOOD BY AUTOMATED COUNT: 14.1 % (ref 11.5–17)
ETHANOL SERPL-MCNC: <10 MG/DL
FENTANYL UR QL SCN: NEGATIVE
GFR SERPLBLD CREATININE-BSD FMLA CKD-EPI: >60 ML/MIN/1.73/M2
GLOBULIN SER-MCNC: 4.5 GM/DL (ref 2.4–3.5)
GLUCOSE SERPL-MCNC: 121 MG/DL (ref 74–100)
GLUCOSE UR QL STRIP: NORMAL
HAV IGM SERPL QL IA: NONREACTIVE
HBV CORE IGM SERPL QL IA: NONREACTIVE
HBV SURFACE AG SERPL QL IA: NONREACTIVE
HCT VFR BLD AUTO: 42.5 % (ref 42–52)
HCV AB SERPL QL IA: NONREACTIVE
HGB BLD-MCNC: 14.6 G/DL (ref 14–18)
HGB UR QL STRIP: ABNORMAL
HYALINE CASTS #/AREA URNS LPF: ABNORMAL /LPF
IMM GRANULOCYTES # BLD AUTO: 0.02 X10(3)/MCL (ref 0–0.04)
IMM GRANULOCYTES NFR BLD AUTO: 0.3 %
KETONES UR QL STRIP: ABNORMAL
LEUKOCYTE ESTERASE UR QL STRIP: NEGATIVE
LYMPHOCYTES # BLD AUTO: 1.42 X10(3)/MCL (ref 0.6–4.6)
LYMPHOCYTES NFR BLD AUTO: 18.9 %
MAGNESIUM SERPL-MCNC: 1.5 MG/DL (ref 1.6–2.6)
MCH RBC QN AUTO: 29.9 PG (ref 27–31)
MCHC RBC AUTO-ENTMCNC: 34.4 G/DL (ref 33–36)
MCV RBC AUTO: 87.1 FL (ref 80–94)
MDMA UR QL SCN: NEGATIVE
MONOCYTES # BLD AUTO: 0.52 X10(3)/MCL (ref 0.1–1.3)
MONOCYTES NFR BLD AUTO: 6.9 %
MUCOUS THREADS URNS QL MICRO: ABNORMAL /LPF
NEUTROPHILS # BLD AUTO: 5.42 X10(3)/MCL (ref 2.1–9.2)
NEUTROPHILS NFR BLD AUTO: 72.3 %
NITRITE UR QL STRIP: NEGATIVE
NRBC BLD AUTO-RTO: 0 %
OPIATES UR QL SCN: NEGATIVE
PCP UR QL: NEGATIVE
PH UR STRIP: 5.5 [PH]
PH UR: 5.5 [PH] (ref 3–11)
PHOSPHATE SERPL-MCNC: 3.8 MG/DL (ref 2.3–4.7)
PLATELET # BLD AUTO: 187 X10(3)/MCL (ref 130–400)
PMV BLD AUTO: 10.2 FL (ref 7.4–10.4)
POTASSIUM SERPL-SCNC: 3.5 MMOL/L (ref 3.5–5.1)
PROT SERPL-MCNC: 8.3 GM/DL (ref 6.4–8.3)
PROT UR QL STRIP: ABNORMAL
RBC # BLD AUTO: 4.88 X10(6)/MCL (ref 4.7–6.1)
RBC #/AREA URNS AUTO: ABNORMAL /HPF
SODIUM SERPL-SCNC: 135 MMOL/L (ref 136–145)
SP GR UR STRIP.AUTO: 1.03 (ref 1–1.03)
SPECIFIC GRAVITY, URINE AUTO (.000) (OHS): 1.03 (ref 1–1.03)
SQUAMOUS #/AREA URNS LPF: ABNORMAL /HPF
TROPONIN I SERPL-MCNC: 0.04 NG/ML (ref 0–0.04)
UROBILINOGEN UR STRIP-ACNC: ABNORMAL
WBC # BLD AUTO: 7.5 X10(3)/MCL (ref 4.5–11.5)
WBC #/AREA URNS AUTO: ABNORMAL /HPF

## 2024-12-06 PROCEDURE — 85025 COMPLETE CBC W/AUTO DIFF WBC: CPT | Performed by: NURSE PRACTITIONER

## 2024-12-06 PROCEDURE — 80307 DRUG TEST PRSMV CHEM ANLYZR: CPT | Performed by: NURSE PRACTITIONER

## 2024-12-06 PROCEDURE — 84100 ASSAY OF PHOSPHORUS: CPT | Performed by: NURSE PRACTITIONER

## 2024-12-06 PROCEDURE — 84484 ASSAY OF TROPONIN QUANT: CPT | Performed by: NURSE PRACTITIONER

## 2024-12-06 PROCEDURE — 81015 MICROSCOPIC EXAM OF URINE: CPT | Performed by: NURSE PRACTITIONER

## 2024-12-06 PROCEDURE — 82077 ASSAY SPEC XCP UR&BREATH IA: CPT | Performed by: NURSE PRACTITIONER

## 2024-12-06 PROCEDURE — 93005 ELECTROCARDIOGRAM TRACING: CPT

## 2024-12-06 PROCEDURE — 80074 ACUTE HEPATITIS PANEL: CPT | Performed by: NURSE PRACTITIONER

## 2024-12-06 PROCEDURE — 63600175 PHARM REV CODE 636 W HCPCS: Performed by: NURSE PRACTITIONER

## 2024-12-06 PROCEDURE — 80053 COMPREHEN METABOLIC PANEL: CPT | Performed by: NURSE PRACTITIONER

## 2024-12-06 PROCEDURE — 96365 THER/PROPH/DIAG IV INF INIT: CPT

## 2024-12-06 PROCEDURE — 96372 THER/PROPH/DIAG INJ SC/IM: CPT | Performed by: NURSE PRACTITIONER

## 2024-12-06 PROCEDURE — 99284 EMERGENCY DEPT VISIT MOD MDM: CPT | Mod: 25

## 2024-12-06 PROCEDURE — 83735 ASSAY OF MAGNESIUM: CPT | Performed by: NURSE PRACTITIONER

## 2024-12-06 RX ORDER — LANOLIN ALCOHOL/MO/W.PET/CERES
400 CREAM (GRAM) TOPICAL 2 TIMES DAILY
Qty: 28 TABLET | Refills: 0 | Status: SHIPPED | OUTPATIENT
Start: 2024-12-06 | End: 2024-12-11 | Stop reason: SDUPTHER

## 2024-12-06 RX ORDER — MAGNESIUM SULFATE HEPTAHYDRATE 40 MG/ML
2 INJECTION, SOLUTION INTRAVENOUS
Status: COMPLETED | OUTPATIENT
Start: 2024-12-06 | End: 2024-12-06

## 2024-12-06 RX ORDER — HYDROXYZINE 50 MG/ML
50 INJECTION, SOLUTION INTRAMUSCULAR
Status: COMPLETED | OUTPATIENT
Start: 2024-12-06 | End: 2024-12-06

## 2024-12-06 RX ADMIN — MAGNESIUM SULFATE HEPTAHYDRATE 2 G: 40 INJECTION, SOLUTION INTRAVENOUS at 11:12

## 2024-12-06 RX ADMIN — HYDROXYZINE HYDROCHLORIDE 50 MG: 50 INJECTION, SOLUTION INTRAMUSCULAR at 11:12

## 2024-12-06 NOTE — DISCHARGE INSTRUCTIONS
Follow up with your primary care physician in 3-5 days for follow up evaluation.  Take medication as prescribed.  Return to the [facility] ED immediately for onset of chest pain, SOB, or fever.

## 2024-12-06 NOTE — ED PROVIDER NOTES
"Encounter Date: 12/6/2024       History     Chief Complaint   Patient presents with    Drug / Alcohol Assessment     PT REPORTS USING DRUGS AND ETOH,  THINKS HE MAY HAVE BEEN SLIPPED SOMETHING.  CO ANXIETY W NV LAST PM.  DENIES CP/SOB.  -SI-HI. UNSURE OF WANTING REHAB.  HR > 130.  EKG OBTAINED.      Patient is a 59-year-old male presents to the [facility] emergency room for evaluation.  Patient reports history alcohol use last consumed alcohol yesterday.  Denies muscle tremors, chest pain, shortness of breath, fever, or nausea and vomiting.  Admits to being uncertain reason behind ER visit today, says "they brought me here." Patient admits to being anxious at this time; however, he denies SI, HI, auditory/visual hallucinations.  History of cirrhosis and hypertension.      Review of patient's allergies indicates:  No Known Allergies  Past Medical History:   Diagnosis Date    Cirrhosis     Hypertension      Past Surgical History:   Procedure Laterality Date    ESOPHAGOGASTRODUODENOSCOPY      none       Family History   Problem Relation Name Age of Onset    Cancer Sister       Social History     Tobacco Use    Smoking status: Never     Passive exposure: Past    Smokeless tobacco: Never   Substance Use Topics    Alcohol use: Yes     Alcohol/week: 42.0 standard drinks of alcohol     Types: 42 Cans of beer per week     Comment: 1-2 40oz beer/d    Drug use: Yes     Types: Marijuana, Heroin     Comment: states "every now and then"     Review of Systems   Constitutional:  Negative for chills, diaphoresis, fatigue and fever.   HENT:  Negative for facial swelling, postnasal drip, rhinorrhea, sinus pressure, sinus pain, sore throat and trouble swallowing.    Respiratory:  Negative for cough, chest tightness, shortness of breath and wheezing.    Cardiovascular:  Negative for chest pain, palpitations and leg swelling.   Gastrointestinal:  Negative for abdominal pain, diarrhea, nausea and vomiting.   Genitourinary:  Negative for " dysuria, flank pain, hematuria and urgency.   Musculoskeletal:  Negative for arthralgias, back pain and myalgias.   Skin:  Negative for color change and rash.   Neurological:  Negative for dizziness, syncope, weakness and headaches.   Hematological:  Does not bruise/bleed easily.   Psychiatric/Behavioral:  The patient is nervous/anxious.    All other systems reviewed and are negative.      Physical Exam     Initial Vitals [12/06/24 1001]   BP Pulse Resp Temp SpO2   (!) 160/95 (!) 135 18 97.9 °F (36.6 °C) 100 %      MAP       --         Physical Exam    Nursing note and vitals reviewed.  Constitutional: Vital signs are normal. He appears well-developed and well-nourished.   HENT:   Head: Normocephalic.   Nose: Nose normal. Mouth/Throat: Oropharynx is clear and moist.   Eyes: Conjunctivae and EOM are normal. Pupils are equal, round, and reactive to light.   Neck: Neck supple.   Normal range of motion.  Cardiovascular:  Regular rhythm, normal heart sounds and intact distal pulses.   Tachycardia present.         Pulmonary/Chest: Effort normal and breath sounds normal. No respiratory distress. He has no wheezes. He has no rhonchi. He has no rales. He exhibits no tenderness.   Abdominal: Abdomen is soft and flat. Bowel sounds are normal. There is no abdominal tenderness. There is no rebound, no guarding, no tenderness at McBurney's point and negative Feliz's sign.   Musculoskeletal:         General: Normal range of motion.      Cervical back: Normal range of motion and neck supple.     Neurological: He is alert and oriented to person, place, and time. He has normal strength.   Skin: Skin is warm and dry. Capillary refill takes less than 2 seconds.   Psychiatric: His behavior is normal. Judgment and thought content normal. His mood appears anxious. He expresses no homicidal and no suicidal ideation. He expresses no suicidal plans and no homicidal plans.         ED Course   Procedures  Labs Reviewed   COMPREHENSIVE  METABOLIC PANEL - Abnormal       Result Value    Sodium 135 (*)     Potassium 3.5      Chloride 99      CO2 22      Glucose 121 (*)     Blood Urea Nitrogen 17.6      Creatinine 1.05      Calcium 9.8      Protein Total 8.3      Albumin 3.8      Globulin 4.5 (*)     Albumin/Globulin Ratio 0.8 (*)     Bilirubin Total 1.9 (*)     ALP 54      ALT 80 (*)      (*)     eGFR >60      Anion Gap 14.0      BUN/Creatinine Ratio 17     DRUG SCREEN, URINE (BEAKER) - Abnormal    Amphetamines, Urine Positive (*)     Barbiturates, Urine Negative      Benzodiazepine, Urine Negative      Cannabinoids, Urine Positive (*)     Cocaine, Urine Negative      Fentanyl, Urine Negative      MDMA, Urine Negative      Opiates, Urine Negative      Phencyclidine, Urine Negative      pH, Urine 5.5      Specific Gravity, Urine Auto 1.028      Narrative:     Cut off concentrations:    Amphetamines - 1000 ng/ml  Barbiturates - 200 ng/ml  Benzodiazepine - 200 ng/ml  Cannabinoids (THC) - 50 ng/ml  Cocaine - 300 ng/ml  Fentanyl - 1.0 ng/ml  MDMA - 500 ng/ml  Opiates - 300 ng/ml   Phencyclidine (PCP) - 25 ng/ml    Specimen submitted for drug analysis and tested for pH and specific gravity in order to evaluate sample integrity. Suspect tampering if specific gravity is <1.003 and/or pH is not within the range of 4.5 - 8.0  False negatives may result form substances such as bleach added to urine.  False positives may result for the presence of a substance with similar chemical structure to the drug or its metabolite.    This test provides only a PRELIMINARY analytical test result. A more specific alternate chemical method must be used in order to obtain a confirmed analytical result. Gas chromatography/mass spectrometry (GC/MS) is the preferred confirmatory method. Other chemical confirmation methods are available. Clinical consideration and professional judgement should be applied to any drug of abuse test result, particularly when preliminary  positive results are used.    Positive results will be confirmed only at the physicians request. Unconfirmed screening results are to be used only for medical purposes (treatment).        URINALYSIS, REFLEX TO URINE CULTURE - Abnormal    Color, UA [place]      Appearance, UA Hazy (*)     Specific Gravity, UA 1.028      pH, UA 5.5      Protein, UA 1+ (*)     Glucose, UA Normal      Ketones, UA 1+ (*)     Blood, UA Trace (*)     Bilirubin, UA Negative      Urobilinogen, UA 1+ (*)     Nitrites, UA Negative      Leukocyte Esterase, UA Negative      RBC, UA None Seen      WBC, UA 6-10 (*)     Bacteria, UA Trace (*)     Squamous Epithelial Cells, UA Trace (*)     Mucous, UA Many (*)     Hyaline Casts, UA None Seen     MAGNESIUM - Abnormal    Magnesium Level 1.50 (*)    TROPONIN I - Normal    Troponin-I 0.035     PHOSPHORUS - Normal    Phosphorus Level 3.8     HEPATITIS PANEL, ACUTE - Normal    Hep A IgM Interp Nonreactive      Hep B Core IgM Interp Nonreactive      Hep BsAg Interp Nonreactive      Hep C Ab Interp Nonreactive     ALCOHOL,MEDICAL (ETHANOL) - Normal    Ethanol Level <10.0     CBC W/ AUTO DIFFERENTIAL    Narrative:     The following orders were created for panel order CBC auto differential.  Procedure                               Abnormality         Status                     ---------                               -----------         ------                     CBC with Differential[[id number]]                           Final result                 Please view results for these tests on the individual orders.   CBC WITH DIFFERENTIAL    WBC 7.50      RBC 4.88      Hgb 14.6      Hct 42.5      MCV 87.1      MCH 29.9      MCHC 34.4      RDW 14.1      Platelet 187      MPV 10.2      Neut % 72.3      Lymph % 18.9      Mono % 6.9      Eos % 1.3      Basophil % 0.3      Lymph # 1.42      Neut # 5.42      Mono # 0.52      Eos # 0.10      Baso # 0.02      IG# 0.02      IG% 0.3      NRBC% 0.0       EKG Readings:  (Independently Interpreted)   Initial Reading: No STEMI. Previous EKG Date: 6/27/24. Rhythm: Sinus Tachycardia. Heart Rate: 110.   Sinus tachycardia   Left axis deviation     ECG Results              EKG 12-lead (Rapid Heart Beat / Palpitations) Age > 50 (In process)        Collection Time Result Time QRS Duration OHS QTC Calculation    12/06/24 10:18:27 12/06/24 10:39:13 88 465                     In process by Interface, Lab [facility] (12/06/24 10:39:21)                   Narrative:    Test Reason : R00.0,    Vent. Rate : 110 BPM     Atrial Rate : 110 BPM     P-R Int : 160 ms          QRS Dur :  88 ms      QT Int : 344 ms       P-R-T Axes :  54 -44  50 degrees    QTcB Int : 465 ms    Sinus tachycardia  Left axis deviation  Minimal voltage criteria for LVH, may be normal variant  Abnormal ECG  When compared with ECG of 27-Jun-2024 10:34,  No significant change was found    Referred By: AAAREFERRAL SELF           Confirmed By:                                   Imaging Results    None          Medications   magnesium sulfate 2g in water 50mL IVPB (premix) (2 g Intravenous New Bag 12/6/24 1145)   hydrOXYzine injection 50 mg (50 mg Intramuscular Given 12/6/24 1136)     Medical Decision Making  Differential:  Substance abuse  Electrolyte imbalance  Anxiety  AMI    Amount and/or Complexity of Data Reviewed  Labs: ordered.    Risk  Prescription drug management.               ED Course as of 12/06/24 1225   Fri Dec 06, 2024   1222 Given strict ED return precautions. I have spoken with the patient and/or caregivers. I have explained the patient's condition, diagnoses and treatment plan based on the information available to me at this time. I have discussed with pt beginning rehab services for substance abuse which he has declined at this time. I have answered the patient's and/or caregiver's questions and addressed any concerns. The patient and/or caregivers have as good an understanding of the patient's diagnosis,  condition and treatment plan as can be expected at this point. The vital signs have been stable. The patient's condition is stable and appropriate for discharge from the emergency department.      The patient will pursue further outpatient evaluation with the primary care physician or other designated or consulting physician as outlined in the discharge instructions. The patient and/or caregivers are agreeable to this plan of care and follow-up instructions have been explained in detail. The patient and/or caregivers have received these instructions in written format and have expressed an understanding of the discharge instructions. The patient and/or caregivers are aware that any significant change in condition or worsening of symptoms should prompt an immediate return to this or the closest emergency department or a call to 911.   [JA]      ED Course User Index  [JA] Charles Wiseman Jr., FNP                           Clinical Impression:  Final diagnoses:  [R00.0] Rapid heart beat  [F19.10] Substance abuse  [F41.9] Anxiety  [E83.42] Hypomagnesemia (Primary)          ED Disposition Condition    Discharge Stable          ED Prescriptions       Medication Sig Dispense Start Date End Date Auth. Provider    magnesium oxide (MAG-OX) 400 mg (241.3 mg magnesium) tablet Take 1 tablet (400 mg total) by mouth 2 (two) times daily. for 14 days 28 tablet 12/6/2024 12/20/2024 Charles Wiseman Jr., FNP          Follow-up Information       Follow up With Specialties Details Why Contact Info    Natan Singh MD Internal Medicine In 3 days  [address] [facility] [address]  [phone]      [facility] Emergency Dept Emergency Medicine In 3 days As needed, If symptoms worsen [address] [id number] [phone]             Charles Wiseman Jr., FNP  12/06/24 [id number]

## 2024-12-09 LAB
OHS QRS DURATION: 88 MS
OHS QTC CALCULATION: 465 MS

## 2024-12-11 ENCOUNTER — HOSPITAL ENCOUNTER (OUTPATIENT)
Dept: RADIOLOGY | Facility: HOSPITAL | Age: 59
Discharge: HOME | End: 2024-12-11
Attending: INTERNAL MEDICINE
Payer: MEDICAID

## 2024-12-11 ENCOUNTER — OFFICE VISIT (OUTPATIENT)
Dept: INTERNAL MEDICINE | Facility: CLINIC | Age: 59
End: 2024-12-11
Payer: MEDICAID

## 2024-12-11 VITALS
RESPIRATION RATE: 20 BRPM | HEIGHT: 69 IN | HEART RATE: 94 BPM | DIASTOLIC BLOOD PRESSURE: 82 MMHG | WEIGHT: 200.63 LBS | TEMPERATURE: 98 F | SYSTOLIC BLOOD PRESSURE: 148 MMHG | OXYGEN SATURATION: 99 % | BODY MASS INDEX: 29.71 KG/M2

## 2024-12-11 DIAGNOSIS — K59.00 CONSTIPATION, UNSPECIFIED CONSTIPATION TYPE: ICD-10-CM

## 2024-12-11 DIAGNOSIS — R33.9 URINARY RETENTION WITH INCOMPLETE BLADDER EMPTYING: ICD-10-CM

## 2024-12-11 DIAGNOSIS — I10 HYPERTENSION, UNSPECIFIED TYPE: ICD-10-CM

## 2024-12-11 DIAGNOSIS — J32.9 CHRONIC SINUSITIS, UNSPECIFIED LOCATION: ICD-10-CM

## 2024-12-11 DIAGNOSIS — M25.569 KNEE PAIN, UNSPECIFIED CHRONICITY, UNSPECIFIED LATERALITY: ICD-10-CM

## 2024-12-11 DIAGNOSIS — J31.0 CHRONIC RHINITIS: ICD-10-CM

## 2024-12-11 DIAGNOSIS — G72.1 ALCOHOLIC MYOPATHY: ICD-10-CM

## 2024-12-11 DIAGNOSIS — E83.42 HYPOMAGNESEMIA: ICD-10-CM

## 2024-12-11 DIAGNOSIS — J31.0 RHINITIS, UNSPECIFIED TYPE: ICD-10-CM

## 2024-12-11 DIAGNOSIS — M85.80 OSTEOPENIA, UNSPECIFIED LOCATION: ICD-10-CM

## 2024-12-11 DIAGNOSIS — A04.8 H. PYLORI INFECTION: ICD-10-CM

## 2024-12-11 DIAGNOSIS — K21.9 GASTROESOPHAGEAL REFLUX DISEASE, UNSPECIFIED WHETHER ESOPHAGITIS PRESENT: ICD-10-CM

## 2024-12-11 DIAGNOSIS — E78.5 HYPERLIPIDEMIA, UNSPECIFIED HYPERLIPIDEMIA TYPE: ICD-10-CM

## 2024-12-11 DIAGNOSIS — E87.6 HYPOKALEMIA: ICD-10-CM

## 2024-12-11 DIAGNOSIS — F10.10 ALCOHOL ABUSE: ICD-10-CM

## 2024-12-11 DIAGNOSIS — Z55.0 LOW-LEVEL OF LITERACY: ICD-10-CM

## 2024-12-11 DIAGNOSIS — M19.90 OSTEOARTHRITIS, UNSPECIFIED OSTEOARTHRITIS TYPE, UNSPECIFIED SITE: Primary | ICD-10-CM

## 2024-12-11 DIAGNOSIS — M19.90 OSTEOARTHRITIS, UNSPECIFIED OSTEOARTHRITIS TYPE, UNSPECIFIED SITE: ICD-10-CM

## 2024-12-11 PROCEDURE — 99215 OFFICE O/P EST HI 40 MIN: CPT | Mod: PBBFAC,25

## 2024-12-11 PROCEDURE — 73562 X-RAY EXAM OF KNEE 3: CPT | Mod: TC,50

## 2024-12-11 RX ORDER — LANOLIN ALCOHOL/MO/W.PET/CERES
400 CREAM (GRAM) TOPICAL 2 TIMES DAILY
Qty: 180 TABLET | Refills: 3 | Status: SHIPPED | OUTPATIENT
Start: 2024-12-11

## 2024-12-11 RX ORDER — MULTIVITAMIN
1 TABLET ORAL DAILY
Qty: 90 TABLET | Refills: 3 | Status: SHIPPED | OUTPATIENT
Start: 2024-12-11

## 2024-12-11 RX ORDER — POTASSIUM CHLORIDE 20 MEQ/1
20 TABLET, EXTENDED RELEASE ORAL DAILY
Qty: 90 TABLET | Refills: 3 | Status: SHIPPED | OUTPATIENT
Start: 2024-12-11

## 2024-12-11 RX ORDER — ATORVASTATIN CALCIUM 40 MG/1
40 TABLET, FILM COATED ORAL DAILY
Qty: 90 TABLET | Refills: 3 | Status: SHIPPED | OUTPATIENT
Start: 2024-12-11 | End: 2025-12-11

## 2024-12-11 RX ORDER — NALTREXONE HYDROCHLORIDE 50 MG/1
50 TABLET, FILM COATED ORAL DAILY
Qty: 90 TABLET | Refills: 3 | Status: SHIPPED | OUTPATIENT
Start: 2024-12-11

## 2024-12-11 RX ORDER — NALTREXONE HYDROCHLORIDE 50 MG/1
50 TABLET, FILM COATED ORAL DAILY
COMMUNITY
End: 2024-12-11 | Stop reason: SDUPTHER

## 2024-12-11 RX ORDER — TAMSULOSIN HYDROCHLORIDE 0.4 MG/1
0.4 CAPSULE ORAL DAILY
Qty: 90 CAPSULE | Refills: 3 | Status: SHIPPED | OUTPATIENT
Start: 2024-12-11 | End: 2025-12-11

## 2024-12-11 RX ORDER — LANOLIN ALCOHOL/MO/W.PET/CERES
400 CREAM (GRAM) TOPICAL 2 TIMES DAILY
Qty: 180 TABLET | Refills: 3 | Status: SHIPPED | OUTPATIENT
Start: 2024-12-11 | End: 2024-12-11 | Stop reason: SDUPTHER

## 2024-12-11 RX ORDER — PANTOPRAZOLE SODIUM 40 MG/1
40 TABLET, DELAYED RELEASE ORAL DAILY
Qty: 90 TABLET | Refills: 3 | Status: SHIPPED | OUTPATIENT
Start: 2024-12-11 | End: 2025-12-11

## 2024-12-11 RX ORDER — NIFEDIPINE 30 MG/1
30 TABLET, EXTENDED RELEASE ORAL DAILY
Qty: 90 TABLET | Refills: 3 | Status: SHIPPED | OUTPATIENT
Start: 2024-12-11 | End: 2025-12-11

## 2024-12-11 RX ORDER — LACTULOSE 10 G/15ML
15 SOLUTION ORAL 3 TIMES DAILY
Qty: 90 ML | Refills: 5 | Status: SHIPPED | OUTPATIENT
Start: 2024-12-11

## 2024-12-11 RX ORDER — ACAMPROSATE CALCIUM 333 MG/1
666 TABLET, DELAYED RELEASE ORAL 3 TIMES DAILY
COMMUNITY
End: 2024-12-11

## 2024-12-11 RX ORDER — CARVEDILOL 6.25 MG/1
6.25 TABLET ORAL 2 TIMES DAILY WITH MEALS
Qty: 180 TABLET | Refills: 3 | Status: SHIPPED | OUTPATIENT
Start: 2024-12-11 | End: 2025-12-11

## 2024-12-11 RX ORDER — CHOLECALCIFEROL (VITAMIN D3) 50 MCG
2000 TABLET ORAL DAILY
Qty: 90 TABLET | Refills: 3 | Status: SHIPPED | OUTPATIENT
Start: 2024-12-11

## 2024-12-11 RX ORDER — FLUTICASONE PROPIONATE 50 MCG
1 SPRAY, SUSPENSION (ML) NASAL DAILY
Qty: 16 G | Refills: 11 | Status: SHIPPED | OUTPATIENT
Start: 2024-12-11

## 2024-12-11 SDOH — SOCIAL DETERMINANTS OF HEALTH (SDOH): ILITERACY AND LOW LEVEL LITERACY: Z55.0

## 2024-12-11 NOTE — PROGRESS NOTES
"    INTERNAL MEDICINE RESIDENT CLINIC  CLINIC NOTE    Patient Name: Ger Denton  YOB: 1965  Chief Complaint: Health Maintenance     PRESENTING HISTORY   History of Present Illness:  Mr. Ger Denton is a 59 y.o. male w/ PMHx of chronic alcoholism, hepatosteatosis, hypertension, and history of vitamin D deficiency who presents to internal medicine clinic for follow up.     Continues drinking. 2 quarts of old english.   Stopped Campral per psych last visit, patient still taking.   Unsure of what meds currently taking and how, but has bag of med with during interview.   States he is taking all the meds in the bag. Just what they are named, dosing, and why he is taking them is unknown to patient.    Patient has knowledge deficit and literacy difficulty comparing to same named meds next to each other.      Social Hx:  Chronic alcoholism w/ 4-5 40 oz beer reduction from daily to only on weekends; reduction from daily reported at last visit.  Lifetime nonsmoker. Denies illicit drug use.      Review of Systems:  12 point review of symptoms negative unless otherwise stated above    PAST HISTORY:     Past Medical History:   Diagnosis Date    Cirrhosis     Hypertension         Past Surgical History:   Procedure Laterality Date    ESOPHAGOGASTRODUODENOSCOPY      none         Family History   Problem Relation Name Age of Onset    Cancer Sister         Social History     Socioeconomic History    Marital status: Single   Tobacco Use    Smoking status: Never     Passive exposure: Past    Smokeless tobacco: Never   Substance and Sexual Activity    Alcohol use: Yes     Alcohol/week: 42.0 standard drinks of alcohol     Types: 42 Cans of beer per week     Comment: 1-2 40oz beer/d    Drug use: Yes     Types: Marijuana, Heroin     Comment: states "every now and then"    Sexual activity: Not Currently     Social Drivers of Health     Financial Resource Strain: Low Risk  (11/15/2023)    Overall Financial Resource Strain " (CARDIA)     Difficulty of Paying Living Expenses: Not hard at all   Food Insecurity: No Food Insecurity (11/15/2023)    Hunger Vital Sign     Worried About Running Out of Food in the Last Year: Never true     Ran Out of Food in the Last Year: Never true   Transportation Needs: No Transportation Needs (11/15/2023)    PRAPARE - Transportation     Lack of Transportation (Medical): No     Lack of Transportation (Non-Medical): No   Physical Activity: Inactive (11/15/2023)    Exercise Vital Sign     Days of Exercise per Week: 0 days     Minutes of Exercise per Session: 0 min   Stress: No Stress Concern Present (11/15/2023)    [employer] [place] [employer] - Occupational Stress Questionnaire     Feeling of Stress : Not at all   Housing Stability: Low Risk  (11/15/2023)    Housing Stability Vital Sign     Unable to Pay for Housing in the Last Year: No     Number of Places Lived in the Last Year: 1     Unstable Housing in the Last Year: No       MEDICATIONS:     Current Outpatient Medications   Medication Instructions    atorvastatin (LIPITOR) 40 mg, Oral, Daily    carvediloL (COREG) 6.25 mg, Oral, 2 times daily with meals    cholecalciferol (vitamin D3) (VITAMIN D3) 2,000 Units, Oral, Daily    fluticasone propionate (FLONASE) 50 mcg, Each Nostril, Daily    lactulose (CHRONULAC) 15 g, Oral, 3 times daily    magnesium oxide (MAG-OX) 400 mg, Oral, 2 times daily    multivitamin (THERAGRAN) per tablet 1 tablet, Oral, Daily    naltrexone (DEPADE) 50 mg, Oral, Daily    NIFEdipine (PROCARDIA-XL) 30 mg, Oral, Daily    pantoprazole (PROTONIX) 40 mg, Oral, Daily, 30 min before breakfast    potassium chloride SA (K-DUR,KLOR-CON) 20 MEQ tablet 20 mEq, Oral, Daily    senna-docusate 8.6-50 mg (SENNA WITH DOCUSATE SODIUM) 8.6-50 mg per tablet 1 tablet, Oral, 2 times daily PRN    tamsulosin (FLOMAX) 0.4 mg, Oral, Daily        OBJECTIVE:   Vital Signs:  Vitals:    12/11/24 0817 12/11/24 0854   BP: (!) 157/84 (!) 148/82   Pulse:  "94    Resp: 20    Temp: 98.1 °F (36.7 °C)    TempSrc: Oral    SpO2: 99%    Weight: 91 kg (200 lb 9.6 oz)    Height: 5' 9" (1.753 m)                 Physical Exam:  Physical Exam  Vitals and nursing note reviewed.   Constitutional:       General: He is not in acute distress.     Appearance: Normal appearance. He is not ill-appearing, toxic-appearing or diaphoretic.      Comments: Non tremulous, calm demeanor.   HENT:      Head: Normocephalic.   Eyes:      General: No scleral icterus.     Extraocular Movements: Extraocular movements intact.      Pupils: Pupils are equal, round, and reactive to light.   Cardiovascular:      Rate and Rhythm: Normal rate and regular rhythm.      Pulses: Normal pulses.      Heart sounds: Normal heart sounds. No murmur heard.  Pulmonary:      Effort: Pulmonary effort is normal. No respiratory distress.      Breath sounds: Normal breath sounds. No wheezing or rales.   Abdominal:      General: Abdomen is flat. There is no distension.      Palpations: Abdomen is soft.      Tenderness: There is no abdominal tenderness. There is no guarding.   Musculoskeletal:         General: No swelling or tenderness. Normal range of motion.      Cervical back: Normal range of motion.      Right lower leg: No edema.      Left lower leg: No edema.      Comments: LT quadriceps musculature notably atrophied when compared to RT; no appreciable weakness with physical exam.    Tenderness with palpation on RT sided paraspinal L spine. Pain reproduced with facet stressing bilaterally.   Skin:     General: Skin is warm.      Capillary Refill: Capillary refill takes less than 2 seconds.      Coloration: Skin is not jaundiced or pale.      Findings: No lesion or rash.   Neurological:      General: No focal deficit present.      Mental Status: He is alert and oriented to person, place, and time. Mental status is at baseline.           Laboratory  Lab Results   Component Value Date     (L) 12/06/2024    K 3.5 " 12/06/2024    CL 99 12/06/2024    CO2 22 12/06/2024    BUN 17.6 12/06/2024    CREATININE 1.05 12/06/2024    CALCIUM 9.8 12/06/2024    BILIDIR 0.3 07/29/2021    IBILI 0.30 07/29/2021    ALKPHOS 54 12/06/2024     (H) 12/06/2024    ALT 80 (H) 12/06/2024    MG 1.50 (L) 12/06/2024    PHOS 3.8 12/06/2024        Lab Results   Component Value Date    WBC 7.50 12/06/2024    RBC 4.88 12/06/2024    HGB 14.6 12/06/2024    HCT 42.5 12/06/2024    MCV 87.1 12/06/2024    MCH 29.9 12/06/2024    MCHC 34.4 12/06/2024    RDW 14.1 12/06/2024     12/06/2024    MPV 10.2 12/06/2024         Latest Reference Range & Units Most Recent 11/20/23 08:32   Cholesterol Total <=200 mg/dL 200  5/6/24 12:33 206 (H)   HDL 35 - 60 mg/dL 59  5/6/24 12:33 58   Total Cholesterol/HDL Ratio 0 - 5  3  5/6/24 12:33 4   Triglycerides 34 - 140 mg/dL 49  5/6/24 12:33 77   LDL Cholesterol 50.00 - 140.00 mg/dL 131.00  5/6/24 12:33 133.00   Very Low Density Lipoprotein  10  5/6/24 12:33 15   (H): Data is abnormally high    Diagnostic Results:      ASSESSMENT & PLAN:     Chronic Alcoholism  Alcohol Withdrawal  Thrombocytopenia  -Refilled meds  -Will get updated INR, labs - MELD score today; (Last update MELD-Na 6; Child Jean Baptiste Class A (updated 11/6/23); INR 1.0 08/25/2023)  -Follows with Dr. Lowe Psychiatry for assistance with alcohol dependence  -c/w Naltrexone 50 mg daily ; Campral stopped per psych (patient active drinker)    Knowledge Deficit / Literacy Deficit  -Interview today revealed patient is unable to recognize same medication bottles per reading skill; given potential risk this poses (patient is on Kcl), referral for outpatient  for home health services    Hypomagnesemia  Hypokalemia  -repeat lab work pending  -continues on Mag Oxide 400 mg once daily and Kcl 20 mEq once daily    Lumbar Spondylosis  OA of RT knee and Bilateral Hips  Associated Bilateral Hip and Knee Pain  -EMG study 4 extremities? Ordered in past  -re - referred  to Physical Therapy    Hepatic Steatosis  HLD  Cholelithiasis  -lipid panel update ordered      CIRRHOSIS COUNSELING:  - strict abstinence of alcohol use  - low sodium (salt) 2000mg per day  - Nutrition: 25-30kcal (calorie per body weight in kilogram) per day  - No need to restrict protein in diet  - High protein diet: 1.2-1.5 gram/kg (protein per body weight in kg) per day to prevent muscle mass loss  - Resistance exercises for muscle strength  - Avoid raw seafoods due to risk of fatal Vibrio vulnificus infection  - Avoid Non-steroidal anti-inflammatory drugs (NSAIDs) such as ibuprofen, Motrin, naproxen, Aleve due to risk of kidney damage  - Can take acetaminophen (tylenol), no more than 2000mg per day  - Compliance with all medications  - Ultrasound or MRI of the liver every 6 months for liver cancer screening  - Upper endoscopy every 1-2 years to screen for varices     Chronic Rhinitis  - Continue Flonase daily    Anemia s/t Alcoholism (resolved)  - H/H stable  -Last anemia profile consistent with diagnosis    Hx of Helicobacter Pylori Infection (Treated; confirmed eradication)  - Currently stable, denies any ongoing symptoms  - Completed Pylera; H. Pylori Fecal Antigen confirmed eradication 05/09/2023; Stomach, Antrum biopsy 11/28/22 revealing chronic active gastritis and numerous helicobacter-like organisms    HTN  - BP well controlled; 148/82  - Continue Coreg 6.25 mg BID and nifedipine 30 mg XR daily  - Avoid  ARB/ACEi given concerns of liver cirrhosis    Osteopenia  Vitamin D Deficiency  - increased to Vit D 2000 units daily   -prescribed Multivitamin    Health Maintenance/ Wellness  Varices Screening (EGD): Completed for 11/28/22 by Dr. Green; Unremarkable for varices; H. Pylori positive biopsy on lesion in antrum; treated, eradicated; next due 11/28/2024? - will inquire  Lung Cancer Screening: not indicated, nonsmoker  HIV/Hep screening: nonreactive 11/27/20  Colon Cancer Screening: Cologuard neg  12/23/22; next due 12/23/25    POCT A1c 5 6/27/24    Immunization History   Administered Date(s) Administered    COVID-19, MRNA, LN-S, PF (Pfizer) (Purple Cap) 05/28/2021, 06/17/2021    Pneumococcal Conjugate - 20 Valent 12/12/2022    Td (ADULT) 08/26/2012    Tdap 02/08/2023    Zoster Recombinant 06/24/2022, 09/22/2022         RTC in 3 months    Referral to  for home health - medication rec    Natan Singh MD  Internal Medicine - PGY-3

## 2024-12-12 ENCOUNTER — TELEPHONE (OUTPATIENT)
Dept: INTERNAL MEDICINE | Facility: CLINIC | Age: 59
End: 2024-12-12
Payer: MEDICAID

## 2024-12-12 NOTE — TELEPHONE ENCOUNTER
"Called patient and date of birth verified. Discussed Home Health referral with patient, and asked patient if there was one he preferred. Patient states, "It doesn't matter. You can pick any one."   "

## 2024-12-18 ENCOUNTER — PATIENT OUTREACH (OUTPATIENT)
Dept: ADMINISTRATIVE | Facility: OTHER | Age: 59
End: 2024-12-18
Payer: MEDICAID

## 2025-01-30 ENCOUNTER — EXTERNAL HOME HEALTH (OUTPATIENT)
Dept: HOME HEALTH SERVICES | Facility: HOSPITAL | Age: 60
End: 2025-01-30
Payer: MEDICAID

## 2025-03-25 ENCOUNTER — DOCUMENT SCAN (OUTPATIENT)
Dept: HOME HEALTH SERVICES | Facility: HOSPITAL | Age: 60
End: 2025-03-25
Payer: MEDICAID

## 2025-03-27 ENCOUNTER — OFFICE VISIT (OUTPATIENT)
Dept: BEHAVIORAL HEALTH | Facility: CLINIC | Age: 60
End: 2025-03-27
Payer: MEDICAID

## 2025-03-27 VITALS
SYSTOLIC BLOOD PRESSURE: 139 MMHG | HEART RATE: 78 BPM | TEMPERATURE: 98 F | DIASTOLIC BLOOD PRESSURE: 86 MMHG | WEIGHT: 197.31 LBS | BODY MASS INDEX: 29.14 KG/M2 | OXYGEN SATURATION: 99 %

## 2025-03-27 DIAGNOSIS — F10.20 ALCOHOL USE DISORDER, SEVERE, DEPENDENCE: Primary | ICD-10-CM

## 2025-03-27 PROCEDURE — 99213 OFFICE O/P EST LOW 20 MIN: CPT | Mod: PBBFAC,PN | Performed by: STUDENT IN AN ORGANIZED HEALTH CARE EDUCATION/TRAINING PROGRAM

## 2025-03-27 NOTE — PROGRESS NOTES
"Outpatient Psychiatry Follow-Up Visit    3/27/2025    Clinical Status of Patient:  Outpatient (Ambulatory)    Chief Complaint:  Ger Denton is a 59 y.o. male who presents today for follow-up of substance problems. Patient last seen for follow-up on 11/1/2024. Met with patient.      Interval History and Content of Current Session:  Interim Events/Subjective Report/Content of Current Session:   Pt reports doing "about the same"  overall.  Continues to drink, unchanged from prior.  Says that this situation is largely responsible for his difficulty with cutting down on his drinking.  Says that he lives with family and everyone else drinks.  Says "I'm an alcoholic and that's just the way it is."  Pt denies interest in rehab.  Has very limited ability to engage in planning behavioral change to address his drinking behavior.  Says "I'm gonna stop," but says he isn't ready to purse formal treatment at this time.  Unable to explain his hesitancy and becomes irritable when challenged.  Reports stable mood, fair anxiety.  Sleeping unchanged. Appetite stable, weight stable.  Energy low, motivation fair.  Endorses irritability, endorses hopelessness.  Denies SI/HI/AVH/paranoia, denies plan or desire for self harm or harm to others.  Denies SE from current regimen. Denies change in chronic somatic complaints. Pt voices desire to adjust regimen to address ongoing symptoms.      Psychiatric Review of Systems-is patient experiencing or having changes in  Integrated into HPI above.     Review of Systems   PSYCHIATRIC: Pertinant items are noted in the narrative.  CONSTITUTIONAL: No weight gain or loss.  MUSCULOSKELETAL: No pain or stiffness of the joints.  NEUROLOGIC: No weakness, sensory changes, seizures, confusion, memory loss, tremor or other abnormal movements.  CARDIAC: No CP, no palpitations  RESPIRATORY: No shortness of breath.  CARDIOVASCULAR: No tachycardia or chest pain.  GASTROINTESTINAL: No nausea, vomiting, pain, " "constipation or diarrhea.    Past Medical, Family and Social History: The patient's past medical, family and social history have been reviewed and updated as appropriate within the electronic medical record - see encounter notes.    Compliance: poor    Side effects: denies    Risk Parameters:  Patient reports no suicidal ideation  Patient reports no homicidal ideation  Patient reports no self-injurious behavior  Patient reports no violent behavior    Exam (detailed: at least 9 elements; comprehensive: all 15 elements)   Constitutional  Vitals:  Most recent vital signs, dated less than 90 days prior to this appointment, were reviewed.     Vitals:    03/27/25 0918   BP: 139/86   Pulse: 78   Temp: 98.3 °F (36.8 °C)   SpO2: 99%   Weight: 89.5 kg (197 lb 4.8 oz)        General:   Constitutional: No acute distress, appears stated age, casually dressed    Neurologic:   Motor: moves all extremities spontaneously and without difficulty, no abnormal involuntary movements observed  Gait: normal gait and station    Mental status examination:   Appearance: appears stated age, casually dressed, no acute distress  Behavior: unremarkable for situation, calm and cooperative  Mood: "ok"  Affect: mood congruent and constricted  Thought process: linear and goal directed  Thought content: no plan or desire for self harm or harm to others, denies paranoia, no delusional ideation volunteered  Perceptions: denies hallucinations or other altered perceptions  Associations: appropriate for conversation  Orientation: oriented to day of week, month, year, location, and situation  Language: English, fluid  Attention: able to attend to interview  Insight: fair  Judgement: fair    PHQ9:  Over the last two weeks how often have you been bothered by little interest or pleasure in doing things: 0  Over the last two weeks how often have you been bothered by feeling down, depressed or hopeless: 0  PHQ-2 Total Score: 0  PHQ-9 Score: 15  PHQ-9 " Interpretation: Moderately Severe        11/1/2024    11:16 AM 4/23/2024    12:32 PM 2/23/2024     9:46 AM   GAD7   1. Feeling nervous, anxious, or on edge? 2 1 2   2. Not being able to stop or control worrying? 2 1 2   3. Worrying too much about different things? 2 1 2   4. Trouble relaxing? 2 0 2   5. Being so restless that it is hard to sit still? 1 0 2   6. Becoming easily annoyed or irritable? 0 0 0   7. Feeling afraid as if something awful might happen? 0 0 2   8. If you checked off any problems, how difficult have these problems made it for you to do your work, take care of things at home, or get along with other people? 1 0 2   CAMPBELL-7 Score 9 3 12     Assessment and Diagnosis   Status/Progress: Based on the examination today, the patient's problem(s) is/are inadequatley controlled.  New problems have not been presented today.   Co-morbidities are complicating management of the primary condition.  Number of separate conditions addressed during today's visit: 1 (alcohol use disorder uncontrolled).  Medication management: Yes: Refilling a prescription and Deciding to continue a pre-existing prescription.  Are referral(s) being ordered today: No.  Complexity (level) of medical decision making employed in the encounter: MODERATE.    General Impression:    ICD-10-CM ICD-9-CM   1. Alcohol use disorder, severe, dependence  F10.20 303.90       Intervention/Counseling/Treatment Plan   Again, recommended IP rehab but pt declines at this time  Suspect that pt will have extreme difficulty with cutting down/stopping his drinking with his current level of care, recommended higher level of care  Provided resources for substance use treatment programs in [place] and surrounding areas  Continue naltrexone 50mg daily, encouraged compliance  No need for PEC as pt is not an imminent danger to self or others or gravely disabled due to acute psychiatric illness  Discussed that pt should either call clinic for psychiatric crisis  symptoms or present to nearest emergency room    Discussed with patient informed consent including diagnosis, risks and benefits of proposed treatment above vs. alternative treatments vs. no treatment, as well as serious and common side effects of these treatments, and the inherent unpredictability of individual responses to these treatments. The patient expresses understanding of the above and displays the capacity to agree with this current plan. Patient also agrees that, currently, the benefits outweigh the risks and would like to pursue treatment at this time, and had no other questions.    Instructions:  Take all medications as prescribed.    Abstain from recreational drugs and alcohol.  Present to ED or call 911 for SI/HI plan or intent, psychosis, or medical emergency.    Return to Clinic: Follow up if symptoms worsen or fail to improve.  Given that provider is leaving in the near future, will send list of psychiatry providers to pt's portal account to assist with continuation of care.     Total time:   The total time for services performed on the date of the encounter (including review of prior visit notes, review of notes from other providers, review of results from laboratory/imaging studies, face-to-face time with patient, and time spent on other activities directly related to patient care): 30 minutes.    Natan Lowe MD  [facility]

## 2025-03-31 ENCOUNTER — LAB VISIT (OUTPATIENT)
Dept: LAB | Facility: HOSPITAL | Age: 60
End: 2025-03-31
Payer: MEDICAID

## 2025-03-31 ENCOUNTER — DOCUMENT SCAN (OUTPATIENT)
Dept: HOME HEALTH SERVICES | Facility: HOSPITAL | Age: 60
End: 2025-03-31
Payer: MEDICAID

## 2025-03-31 ENCOUNTER — OFFICE VISIT (OUTPATIENT)
Dept: INTERNAL MEDICINE | Facility: CLINIC | Age: 60
End: 2025-03-31
Payer: MEDICAID

## 2025-03-31 VITALS
TEMPERATURE: 98 F | HEART RATE: 85 BPM | DIASTOLIC BLOOD PRESSURE: 68 MMHG | WEIGHT: 202.81 LBS | SYSTOLIC BLOOD PRESSURE: 116 MMHG | BODY MASS INDEX: 30.04 KG/M2 | HEIGHT: 69 IN | OXYGEN SATURATION: 99 % | RESPIRATION RATE: 18 BRPM

## 2025-03-31 DIAGNOSIS — K21.9 GASTROESOPHAGEAL REFLUX DISEASE, UNSPECIFIED WHETHER ESOPHAGITIS PRESENT: ICD-10-CM

## 2025-03-31 DIAGNOSIS — F10.10 ALCOHOL ABUSE: ICD-10-CM

## 2025-03-31 DIAGNOSIS — E83.42 HYPOMAGNESEMIA: ICD-10-CM

## 2025-03-31 DIAGNOSIS — E87.6 HYPOKALEMIA: ICD-10-CM

## 2025-03-31 DIAGNOSIS — R33.9 URINARY RETENTION WITH INCOMPLETE BLADDER EMPTYING: ICD-10-CM

## 2025-03-31 DIAGNOSIS — E78.5 HYPERLIPIDEMIA, UNSPECIFIED HYPERLIPIDEMIA TYPE: ICD-10-CM

## 2025-03-31 DIAGNOSIS — G72.1 ALCOHOLIC MYOPATHY: ICD-10-CM

## 2025-03-31 DIAGNOSIS — J32.9 CHRONIC SINUSITIS, UNSPECIFIED LOCATION: ICD-10-CM

## 2025-03-31 DIAGNOSIS — A04.8 H. PYLORI INFECTION: ICD-10-CM

## 2025-03-31 DIAGNOSIS — E53.8 FOLATE DEFICIENCY: Primary | ICD-10-CM

## 2025-03-31 DIAGNOSIS — I10 HYPERTENSION, UNSPECIFIED TYPE: ICD-10-CM

## 2025-03-31 DIAGNOSIS — J31.0 CHRONIC RHINITIS: ICD-10-CM

## 2025-03-31 LAB
ALBUMIN SERPL-MCNC: 3.7 G/DL (ref 3.5–5)
ALBUMIN/GLOB SERPL: 0.8 RATIO (ref 1.1–2)
ALP SERPL-CCNC: 73 UNIT/L (ref 40–150)
ALT SERPL-CCNC: 53 UNIT/L (ref 0–55)
ANION GAP SERPL CALC-SCNC: 4 MEQ/L
AST SERPL-CCNC: 134 UNIT/L (ref 11–45)
BILIRUB SERPL-MCNC: 0.3 MG/DL
BUN SERPL-MCNC: 11 MG/DL (ref 8.4–25.7)
CALCIUM SERPL-MCNC: 8.9 MG/DL (ref 8.4–10.2)
CHLORIDE SERPL-SCNC: 109 MMOL/L (ref 98–107)
CO2 SERPL-SCNC: 26 MMOL/L (ref 22–29)
CREAT SERPL-MCNC: 1.1 MG/DL (ref 0.72–1.25)
CREAT/UREA NIT SERPL: 10
GFR SERPLBLD CREATININE-BSD FMLA CKD-EPI: >60 ML/MIN/1.73/M2
GLOBULIN SER-MCNC: 4.6 GM/DL (ref 2.4–3.5)
GLUCOSE SERPL-MCNC: 78 MG/DL (ref 74–100)
MAGNESIUM SERPL-MCNC: 1.7 MG/DL (ref 1.6–2.6)
PHOSPHATE SERPL-MCNC: 3.3 MG/DL (ref 2.3–4.7)
POTASSIUM SERPL-SCNC: 3.8 MMOL/L (ref 3.5–5.1)
PROT SERPL-MCNC: 8.3 GM/DL (ref 6.4–8.3)
SODIUM SERPL-SCNC: 139 MMOL/L (ref 136–145)

## 2025-03-31 PROCEDURE — 84100 ASSAY OF PHOSPHORUS: CPT

## 2025-03-31 PROCEDURE — 36415 COLL VENOUS BLD VENIPUNCTURE: CPT

## 2025-03-31 PROCEDURE — 99214 OFFICE O/P EST MOD 30 MIN: CPT | Mod: PBBFAC

## 2025-03-31 PROCEDURE — 83735 ASSAY OF MAGNESIUM: CPT

## 2025-03-31 PROCEDURE — 80053 COMPREHEN METABOLIC PANEL: CPT

## 2025-03-31 RX ORDER — LANOLIN ALCOHOL/MO/W.PET/CERES
400 CREAM (GRAM) TOPICAL 2 TIMES DAILY
Qty: 180 TABLET | Refills: 3 | Status: SHIPPED | OUTPATIENT
Start: 2025-03-31

## 2025-03-31 RX ORDER — CARVEDILOL 6.25 MG/1
6.25 TABLET ORAL 2 TIMES DAILY WITH MEALS
Qty: 180 TABLET | Refills: 3 | Status: SHIPPED | OUTPATIENT
Start: 2025-03-31 | End: 2026-03-31

## 2025-03-31 RX ORDER — ATORVASTATIN CALCIUM 40 MG/1
40 TABLET, FILM COATED ORAL DAILY
Qty: 90 TABLET | Refills: 3 | Status: SHIPPED | OUTPATIENT
Start: 2025-03-31 | End: 2026-03-31

## 2025-03-31 RX ORDER — PANTOPRAZOLE SODIUM 40 MG/1
40 TABLET, DELAYED RELEASE ORAL DAILY
Qty: 90 TABLET | Refills: 3 | Status: SHIPPED | OUTPATIENT
Start: 2025-03-31 | End: 2026-03-31

## 2025-03-31 RX ORDER — NALTREXONE HYDROCHLORIDE 50 MG/1
50 TABLET, FILM COATED ORAL DAILY
Qty: 90 TABLET | Refills: 3 | Status: SHIPPED | OUTPATIENT
Start: 2025-03-31

## 2025-03-31 RX ORDER — FOLIC ACID 1 MG/1
1000 TABLET ORAL DAILY
COMMUNITY
Start: 2025-02-07 | End: 2025-03-31 | Stop reason: SDUPTHER

## 2025-03-31 RX ORDER — POTASSIUM CHLORIDE 20 MEQ/1
20 TABLET, EXTENDED RELEASE ORAL DAILY
Qty: 90 TABLET | Refills: 3 | Status: SHIPPED | OUTPATIENT
Start: 2025-03-31

## 2025-03-31 RX ORDER — FLUTICASONE PROPIONATE 50 MCG
1 SPRAY, SUSPENSION (ML) NASAL DAILY
Qty: 16 G | Refills: 11 | Status: SHIPPED | OUTPATIENT
Start: 2025-03-31

## 2025-03-31 RX ORDER — LACTULOSE 10 G/15ML
23 SOLUTION ORAL 3 TIMES DAILY
COMMUNITY
Start: 2024-12-11

## 2025-03-31 RX ORDER — FOLIC ACID 1 MG/1
1000 TABLET ORAL DAILY
Qty: 90 TABLET | Refills: 3 | Status: SHIPPED | OUTPATIENT
Start: 2025-03-31

## 2025-03-31 RX ORDER — LORATADINE 10 MG/1
10 TABLET ORAL DAILY
Qty: 90 TABLET | Refills: 3 | Status: SHIPPED | OUTPATIENT
Start: 2025-03-31

## 2025-03-31 RX ORDER — TAMSULOSIN HYDROCHLORIDE 0.4 MG/1
0.4 CAPSULE ORAL DAILY
Qty: 90 CAPSULE | Refills: 3 | Status: SHIPPED | OUTPATIENT
Start: 2025-03-31 | End: 2026-03-31

## 2025-03-31 RX ORDER — LORATADINE 10 MG/1
10 TABLET ORAL DAILY
COMMUNITY
Start: 2025-02-07 | End: 2025-03-31 | Stop reason: SDUPTHER

## 2025-03-31 RX ORDER — NIFEDIPINE 30 MG/1
30 TABLET, EXTENDED RELEASE ORAL DAILY
Qty: 90 TABLET | Refills: 3 | Status: SHIPPED | OUTPATIENT
Start: 2025-03-31 | End: 2026-03-31

## 2025-03-31 NOTE — PROGRESS NOTES
"    INTERNAL MEDICINE RESIDENT CLINIC  CLINIC NOTE    Patient Name: Ger Denton  YOB: 1965  Chief Complaint: Hypertension     PRESENTING HISTORY   History of Present Illness:  Mr. Ger Denton is a 59 y.o. male w/ PMHx of chronic alcoholism, hepatosteatosis, hypertension, and history of vitamin D deficiency who presents to internal medicine clinic for follow up.     Continues drinking. Two 40 oz cans of old english.   Still Naltrexone 50 mg once daily.  Unsure of what meds currently taking and how, but has bag of med with during interview.   States he is taking all the meds in the bag. Just what they are named, dosing, and why he is taking them is unknown to patient.    Patient has knowledge deficit and literacy difficulty comparing to same named meds next to each other.      Social Hx:  Chronic alcoholism,   Lifetime nonsmoker. Denies illicit drug use.      Review of Systems:  12 point review of symptoms negative unless otherwise stated above    PAST HISTORY:     Past Medical History:   Diagnosis Date    Cirrhosis     Hypertension         Past Surgical History:   Procedure Laterality Date    ESOPHAGOGASTRODUODENOSCOPY      none         Family History   Problem Relation Name Age of Onset    Cancer Sister         Social History     Socioeconomic History    Marital status: Single   Tobacco Use    Smoking status: Never     Passive exposure: Past    Smokeless tobacco: Never   Substance and Sexual Activity    Alcohol use: Yes     Alcohol/week: 42.0 standard drinks of alcohol     Types: 42 Cans of beer per week     Comment: 1-2 40oz beer/d    Drug use: Yes     Types: Marijuana, Heroin     Comment: states "every now and then"    Sexual activity: Not Currently     Social Drivers of Health     Financial Resource Strain: Low Risk  (11/15/2023)    Overall Financial Resource Strain (CARDIA)     Difficulty of Paying Living Expenses: Not hard at all   Food Insecurity: No Food Insecurity (11/15/2023)    Hunger " Vital Sign     Worried About Running Out of Food in the Last Year: Never true     Ran Out of Food in the Last Year: Never true   Transportation Needs: No Transportation Needs (11/15/2023)    PRAPARE - Transportation     Lack of Transportation (Medical): No     Lack of Transportation (Non-Medical): No   Physical Activity: Inactive (11/15/2023)    Exercise Vital Sign     Days of Exercise per Week: 0 days     Minutes of Exercise per Session: 0 min   Stress: No Stress Concern Present (11/15/2023)    [employer] [place] [employer] - Occupational Stress Questionnaire     Feeling of Stress : Not at all   Housing Stability: Low Risk  (11/15/2023)    Housing Stability Vital Sign     Unable to Pay for Housing in the Last Year: No     Number of Places Lived in the Last Year: 1     Unstable Housing in the Last Year: No       MEDICATIONS:     Current Outpatient Medications   Medication Instructions    atorvastatin (LIPITOR) 40 mg, Oral, Daily    carvediloL (COREG) 6.25 mg, Oral, 2 times daily with meals    cholecalciferol (vitamin D3) (VITAMIN D3) 2,000 Units, Oral, Daily    CONSTULOSE 10 gram/15 mL solution 23 mLs, 3 times daily    fluticasone propionate (FLONASE) 50 mcg, Each Nostril, Daily    folic acid (FOLVITE) 1,000 mcg, Oral, Daily    loratadine (CLARITIN) 10 mg, Oral, Daily    magnesium oxide (MAG-OX) 400 mg, Oral, 2 times daily    multivitamin (THERAGRAN) per tablet 1 tablet, Oral, Daily    naltrexone (DEPADE) 50 mg, Oral, Daily    NIFEdipine (PROCARDIA-XL) 30 mg, Oral, Daily    pantoprazole (PROTONIX) 40 mg, Oral, Daily, 30 min before breakfast    potassium chloride SA (K-DUR,KLOR-CON) 20 MEQ tablet 20 mEq, Oral, Daily    senna-docusate 8.6-50 mg (SENNA WITH DOCUSATE SODIUM) 8.6-50 mg per tablet 1 tablet, Oral, 2 times daily PRN    tamsulosin (FLOMAX) 0.4 mg, Oral, Daily        OBJECTIVE:   Vital Signs:  Vitals:    03/31/25 0921   BP: 116/68   Pulse: 85   Resp: 18   Temp: 97.9 °F (36.6 °C)   TempSrc: Oral  "  SpO2: 99%   Weight: 92 kg (202 lb 12.8 oz)   Height: 5' 9" (1.753 m)                Physical Exam:  Physical Exam  Vitals and nursing note reviewed.   Constitutional:       General: He is not in acute distress.     Appearance: Normal appearance. He is not ill-appearing, toxic-appearing or diaphoretic.      Comments: Non tremulous, calm demeanor.   HENT:      Head: Normocephalic.   Eyes:      General: No scleral icterus.     Extraocular Movements: Extraocular movements intact.      Pupils: Pupils are equal, round, and reactive to light.   Cardiovascular:      Rate and Rhythm: Normal rate and regular rhythm.      Pulses: Normal pulses.      Heart sounds: Normal heart sounds. No murmur heard.  Pulmonary:      Effort: Pulmonary effort is normal. No respiratory distress.      Breath sounds: Normal breath sounds. No wheezing or rales.   Abdominal:      General: Abdomen is flat. There is no distension.      Palpations: Abdomen is soft.      Tenderness: There is no abdominal tenderness. There is no guarding.   Musculoskeletal:         General: No swelling or tenderness. Normal range of motion.      Cervical back: Normal range of motion.      Right lower leg: No edema.      Left lower leg: No edema.      Comments: LT quadriceps musculature notably atrophied when compared to RT; no appreciable weakness with physical exam.    Tenderness with palpation on RT sided paraspinal L spine. Pain reproduced with facet stressing bilaterally.   Skin:     General: Skin is warm.      Capillary Refill: Capillary refill takes less than 2 seconds.      Coloration: Skin is not jaundiced or pale.      Findings: No lesion or rash.   Neurological:      General: No focal deficit present.      Mental Status: He is alert and oriented to person, place, and time. Mental status is at baseline.           Laboratory  Lab Results   Component Value Date     03/31/2025    K 3.8 03/31/2025     (H) 03/31/2025    CO2 26 03/31/2025    BUN 11.0 " 03/31/2025    CREATININE 1.10 03/31/2025    CALCIUM 8.9 03/31/2025    BILIDIR 0.3 07/29/2021    IBILI 0.30 07/29/2021    ALKPHOS 73 03/31/2025     (H) 03/31/2025    ALT 53 03/31/2025    MG 1.70 03/31/2025    PHOS 3.3 03/31/2025        Lab Results   Component Value Date    WBC 7.50 12/06/2024    RBC 4.88 12/06/2024    HGB 14.6 12/06/2024    HCT 42.5 12/06/2024    MCV 87.1 12/06/2024    MCH 29.9 12/06/2024    MCHC 34.4 12/06/2024    RDW 14.1 12/06/2024     12/06/2024    MPV 10.2 12/06/2024         Latest Reference Range & Units Most Recent 11/20/23 08:32   Cholesterol Total <=200 mg/dL 200  5/6/24 12:33 206 (H)   HDL 35 - 60 mg/dL 59  5/6/24 12:33 58   Total Cholesterol/HDL Ratio 0 - 5  3  5/6/24 12:33 4   Triglycerides 34 - 140 mg/dL 49  5/6/24 12:33 77   LDL Cholesterol 50.00 - 140.00 mg/dL 131.00  5/6/24 12:33 133.00   Very Low Density Lipoprotein  10  5/6/24 12:33 15   (H): Data is abnormally high    Diagnostic Results:      ASSESSMENT & PLAN:     Chronic Alcoholism  Alcohol Withdrawal  Thrombocytopenia  -Refilled meds  -Will get updated INR, labs - MELD score today; (Last update MELD-Na 6; Child Jean Baptiste Class A (updated 11/6/23); INR 1.0 08/25/2023)  -Follows with Dr. Lowe Psychiatry for assistance with alcohol dependence  -c/w Naltrexone 50 mg daily ; Campral stopped per psych (patient active drinker)    Knowledge Deficit / Literacy Deficit  -Interview today revealed patient is unable to recognize same medication bottles per reading skill; given potential risk this poses (patient is on Kcl), referral for outpatient  for home health services    Hypomagnesemia  Hypokalemia  -Mag Oxide 1.7 today  -continues on Mag Oxide 400 mg once daily and Kcl 20 mEq once daily    Lumbar Spondylosis  OA of RT knee and Bilateral Hips  Associated Bilateral Hip and Knee Pain  -EMG study 4 extremities? Ordered in past  -re - referred to Physical Therapy    Hepatic Steatosis  HLD  Cholelithiasis  -lipid panel  update ordered      CIRRHOSIS COUNSELING:  - strict abstinence of alcohol use  - low sodium (salt) 2000mg per day  - Nutrition: 25-30kcal (calorie per body weight in kilogram) per day  - No need to restrict protein in diet  - High protein diet: 1.2-1.5 gram/kg (protein per body weight in kg) per day to prevent muscle mass loss  - Resistance exercises for muscle strength  - Avoid raw seafoods due to risk of fatal Vibrio vulnificus infection  - Avoid Non-steroidal anti-inflammatory drugs (NSAIDs) such as ibuprofen, Motrin, naproxen, Aleve due to risk of kidney damage  - Can take acetaminophen (tylenol), no more than 2000mg per day  - Compliance with all medications  - Ultrasound or MRI of the liver every 6 months for liver cancer screening  - Upper endoscopy every 1-2 years to screen for varices     Chronic Rhinitis  - Continue Flonase daily    Anemia s/t Alcoholism (resolved)  - H/H stable  -Last anemia profile consistent with diagnosis    Hx of Helicobacter Pylori Infection (Treated; confirmed eradication)  - Currently stable, denies any ongoing symptoms  - Completed Pylera; H. Pylori Fecal Antigen confirmed eradication 05/09/2023; Stomach, Antrum biopsy 11/28/22 revealing chronic active gastritis and numerous helicobacter-like organisms    HTN  - BP well controlled; 148/82  - Continue Coreg 6.25 mg BID and nifedipine 30 mg XR daily  - Avoid  ARB/ACEi given concerns of liver cirrhosis    Osteopenia  Vitamin D Deficiency  - recommend OTC Ca+Vit D  -prescribed Multivitamin    Health Maintenance/ Wellness  Varices Screening (EGD): Completed for 11/28/22 by Dr. Green; Unremarkable for varices; H. Pylori positive biopsy on lesion in antrum; treated, eradicated; next due 11/28/2024? - will inquire  Lung Cancer Screening: not indicated, nonsmoker  HIV/Hep screening: nonreactive 11/27/20  Colon Cancer Screening: Cologuard neg 12/23/22; next due 12/23/25    POCT A1c 5 6/27/24    Immunization History   Administered Date(s)  Administered    COVID-19, MRNA, LN-S, PF (Pfizer) (Purple Cap) 05/28/2021, 06/17/2021    Pneumococcal Conjugate - 20 Valent 12/12/2022    Td (ADULT) 08/26/2012    Tdap 02/08/2023    Zoster Recombinant 06/24/2022, 09/22/2022         RTC in 6 months    Referral to  for home health - medication rec    Natan Singh MD  Internal Medicine - PGY-3

## 2025-04-07 ENCOUNTER — EXTERNAL HOME HEALTH (OUTPATIENT)
Dept: HOME HEALTH SERVICES | Facility: HOSPITAL | Age: 60
End: 2025-04-07
Payer: MEDICAID

## 2025-04-11 ENCOUNTER — HOSPITAL ENCOUNTER (OUTPATIENT)
Dept: RADIOLOGY | Facility: HOSPITAL | Age: 60
Discharge: HOME | End: 2025-04-11
Payer: MEDICAID

## 2025-04-11 DIAGNOSIS — M54.9 DORSALGIA, UNSPECIFIED: ICD-10-CM

## 2025-04-11 PROCEDURE — 72148 MRI LUMBAR SPINE W/O DYE: CPT | Mod: TC

## 2025-04-17 DIAGNOSIS — M48.00 CENTRAL STENOSIS OF SPINAL CANAL: Primary | ICD-10-CM

## 2025-05-12 ENCOUNTER — OFFICE VISIT (OUTPATIENT)
Dept: GASTROENTEROLOGY | Facility: CLINIC | Age: 60
End: 2025-05-12
Payer: MEDICAID

## 2025-05-12 VITALS
OXYGEN SATURATION: 100 % | DIASTOLIC BLOOD PRESSURE: 83 MMHG | BODY MASS INDEX: 29.92 KG/M2 | SYSTOLIC BLOOD PRESSURE: 128 MMHG | WEIGHT: 202 LBS | HEART RATE: 76 BPM | TEMPERATURE: 98 F | RESPIRATION RATE: 16 BRPM | HEIGHT: 69 IN

## 2025-05-12 DIAGNOSIS — K70.30 ALCOHOLIC CIRRHOSIS OF LIVER WITHOUT ASCITES: Primary | ICD-10-CM

## 2025-05-12 PROCEDURE — 99214 OFFICE O/P EST MOD 30 MIN: CPT | Mod: PBBFAC | Performed by: STUDENT IN AN ORGANIZED HEALTH CARE EDUCATION/TRAINING PROGRAM

## 2025-05-12 NOTE — PROGRESS NOTES
Subjective     Patient ID: Ger Denton is a 59 y.o. male.    Chief Complaint: Cirrhosis (No complaints)    58-year-old male, history of profound alcoholism, presenting to clinic today for initial appointment with me.  Was last seen by his PCP in April 2022, right personally staffed the resident, he at that point had stated he was drinking a 40 oz of beer a day, that it was affecting his lifestyle relationships with others.  Currently unemployed lives with his sister.  He is currently taking disulfiram for and states he has cut back his drinking to a 6 pack on Saturdays and Sundays where he does not take his aforementioned medication.  He recently had an ER visit June 2022 for volume depletion SEUN, was given IV fluids and discharged, however his lab work at that time it showed his platelets had decreased from a baseline of around 123, now at 76.  I had a kelsey discussion with him today in the room but he is already showing signs of decompensation with decreased platelet count now 76, and that today we need to get updated MELD labs to further ascertain his extent of decompensation.  He denies any melena, hematochezia, hematemesis, or jaundice.  Denies any abdominal lower extremity swelling.  He is overdue for updated EGD, on abdominal ultrasound his last abdominal ultrasound performed November 2021 showed hepatic steatosis.  His last MELD of June 2021 was 11, child Jean Baptiste score class A. He is currently taking diclofenac for reasons unknown, discussed with him will be stopping this medication today.       10/31/22:  No complaints today, states states he still drinks 1 beer on occasion maybe 1 beer per week.  Last saw PCP in September, already with much improvement in his laboratory findings including his platelet count was now back to 209.  He is still taking disulfiram.  Did not get his updated ultrasound of the abdomen for HCC screening will reorder.  Current MELD is 9, Child Jean Baptiste class a.  Denies any melena,  hematochezia, jaundice, hematemesis.     5/1/23:  Relapsed, now drinking 1-240 oz beers per day, previously had actually been improving and maintain abstinence from alcohol when I last saw him in October, his platelet count had been improving, his most recent ultrasound done in February can actually shown marked improvement in his steatosis, and as far as his EGD that was performed November 2022, he had a normal esophagus and duodenum.  However his antrum was biopsied the did show positive for H pylori he completed 10 day course of Pylera.  We will need to repeat his stool antigen.  Does need up-to-date MELD labs as well.  Discussed with him at length, with his current relapse, he states he will attempt to try and maintain abstinence again as he had done last year given his marked improvement overall at that time.  He sees his PCP, Dr. Singh tomorrow.  Denies any melena, hematochezia, jaundice.     11/6/23:  Still drinking 1-2x 40 oz beers per day, was prescribed naltrexone by PCP.  States he is had difficulty standing for prolonged periods of time, does not exercise frequently, has difficulty with transportation.  Repeated his stool antigen to determine eradication of H pylori, stool antigen was negative.  Updated MELD is 6, Child Jean Baptiste class a.  Up-to-date on variceal screening next will be due November 2024.  He no showed it seems that his last sonogram appointment, he needs updated HCC screening.  Denies any melena, hematochezia, jaundice, hematemesis.       5/6/24:  Following Psychiatry outpatient, Dr. Lowe, recently prescribed acamprosate and has been uptitrated to 666 mg t.i.d..  He states to me he did not up titrate his medications yet as he was out of town for a couple of weeks,  was at a wedding.  Unfortunately still consuming alcohol,  had a beer last night and had beer at a wedding he attended out of town.  His primary care physician at ordered MELD labs for today, sodium 139, creatinine  1.02, albumin 3.7, bilirubin 0.4, awaiting INR to calculate MELD.  Denies any melena, hematochezia, jaundice, hematemesis, lower extremity swelling.  HCC screening was updated November 2023 see results below.  Due for repeat endoscopy November of this year.  Phosphatidyl ethanol level was 972 on 03/01/2024.     11/11/24:  Complaining of lower back pain radiating down mainly his right leg down to his knee, saw his PCP back in September endorsed same complaint, x-rays at that time suspicious for a compression fracture in T10 and L1, he states he has been taking a leave at times but this is not help with his pain.  Seems workup ongoing for osteoporosis as well as a DEXA has been placed.  He denies bowel or bladder incontinence, and denies any melena or hematochezia.  Recently saw Psychiatry 11/1 had issues taking acamprosate t.i.d. so as switch to naltrexone 50 mg daily that time, he was back to drinking proximally more than 20 drinks per week.  States since starting naltrexone he only had 3 drinks in total last week.  Endorses 1 bowel movement per day, soft, non straining.  Did not complete his updated abdominal ultrasound for HCC screening.  Up-to-date on MELD labs.    5/12/24:  Recently saw PCP and Psychiatry, still on naltrexone daily, however has been able to cut down his alcohol intake to 1 bottle of 40 oz old English at times.  Appetite he states his on and off aggravated by see foods states he complains of reflux like symptoms.  Denies any nausea vomiting, diarrhea.  Denies any melena or hematochezia, hematemesis.  Denies scleral icterus.  States will be in need of a new primary care physician as his we will be leaving soon.  Attempted to contact PCP during the visit today see about [facility].  HCC screening was updated in December, needs INR today to updated MELD labs.  We will be due for updated EGD today last was in November 2022.  Osteopenia, canal stenosis, was being managed by PCP in referral  seems has been made to [place] [facility] [place] for pain management?    Review of Systems   Constitutional: Negative.    HENT: Negative.     Eyes: Negative.    Respiratory: Negative.     Cardiovascular: Negative.    Gastrointestinal:  Positive for reflux.   Endocrine: Negative.    Genitourinary: Negative.    Musculoskeletal:  Positive for back pain.   Allergic/Immunologic: Negative.    Neurological: Negative.    Hematological: Negative.    Psychiatric/Behavioral: Negative.          Objective   Vitals:    05/12/25 1316   BP: 128/83   Pulse: 76   Resp: 16   Temp: 98.1 °F (36.7 °C)         Physical Exam  Constitutional:       Appearance: Normal appearance. He is obese.   HENT:      Mouth/Throat:      Mouth: Mucous membranes are moist.      Pharynx: Oropharynx is clear.   Eyes:      Conjunctiva/sclera: Conjunctivae normal.   Cardiovascular:      Rate and Rhythm: Normal rate and regular rhythm.      Heart sounds: Normal heart sounds.   Pulmonary:      Effort: Pulmonary effort is normal.      Breath sounds: Normal breath sounds.   Abdominal:      General: Abdomen is flat. Bowel sounds are normal.      Palpations: Abdomen is soft.   Skin:     General: Skin is warm and dry.   Neurological:      Mental Status: He is alert and oriented to person, place, and time. Mental status is at baseline.   Psychiatric:         Mood and Affect: Mood normal.         Thought Content: Thought content normal.         Judgment: Judgment normal.        Assessment and Plan     1. Alcoholic cirrhosis of liver without ascites  -     Case Request Endoscopy: EGD (ESOPHAGOGASTRODUODENOSCOPY)  -     US Abdomen Limited_Liver; Future; Expected date: 06/12/2025  -     Protime-INR        Background:  Alcohol:  Yes, still drinking beer, 2x 40 oz bottles of old English     Tobacco: yes     Liver disease: ETOH     MELD-Na: 7, need INR today to update MELD  Child-Jean Baptiste Class: 5A, see above     Transplant: not under evaluation, low MELD     Cirrhosis is  decompensated with:     Ascites: no  Spontaneous bacterial peritonitis: No  Hepatic Encephalopathy: No  Hepatocellular carcinoma: No  Hepatorenal syndrome: No  Hyponatremia: yes  Muscle wasting: No  Portal vein thrombosis: No     Screening:  Last EGD:  November 28, 2022: Normal esophagus and duodenum.  Antrum revealed evidence of gastritis was biopsied, positive for Helicobacter pylori.        Last imaging study:  Abd US 12/3/24: no hepatic masses seen.     CIRRHOSIS COUNSELING:  - strict abstinence of alcohol use  - low sodium (salt) 2000mg per day  - Nutrition: 25-30kcal (calorie per body weight in kilogram) per day  - No need to restrict protein in diet  - High protein diet: 1.2-1.5 gram/kg (protein per body weight in kg) per day to prevent muscle mass loss  - Resistance exercises for muscle strength  - Avoid raw seafoods due to risk of fatal Vibrio vulnificus infection  - Avoid Non-steroidal anti-inflammatory drugs (NSAIDs) such as ibuprofen, Motrin, naproxen, Aleve due to risk of kidney damage  - Can take acetaminophen (tylenol), no more than 2000mg per day  - Compliance with all medications  - Ultrasound or MRI of the liver every 6 months for liver cancer screening  - Upper endoscopy every 1-2 years to screen for varices      Six-month follow up, we will need EGD updated this year, last EGD in 2022 was positive for H pylori on gastric biopsy, was eradicated with Pylera, stool antigen was negative April 2023.    HCC screening to be updated in June of this year, INR to be done today in the office to updated MELD labs.    Reached out to his primary care physician today to assist with reestablishing in [facility]    Follow up in about 6 months (around 11/12/2025).

## 2025-05-30 ENCOUNTER — TELEPHONE (OUTPATIENT)
Dept: GASTROENTEROLOGY | Facility: CLINIC | Age: 60
End: 2025-05-30
Payer: MEDICAID

## 2025-05-30 NOTE — TELEPHONE ENCOUNTER
----- Message from Cony sent at 5/30/2025  8:34 AM CDT -----  Regarding: No contact to [facility] EGD  Pt was seen by Dr. José 5/12/25 an order was placed for an EGD. I have made several unsuccessful attempts to contact pt to [facility]. Info as follows :5/28/25 lvm pt & sister's #'s, gl5/23/25 spoke w/sister stated pt is not there, she will have him call us back, gl 5/20/25 lvm to [facility], gl 5/14/25 lvm to [facility], glLucila GAN

## 2025-05-30 NOTE — TELEPHONE ENCOUNTER
----- Message from Cony sent at 5/30/2025  8:34 AM CDT -----  Regarding: No contact to [facility] EGD  Pt was seen by Dr. José 5/12/25 an order was placed for an EGD. I have made several unsuccessful attempts to contact pt to [facility]. Info as follows :5/28/25 lvm pt & sister's #'s, gl5/23/25 spoke w/sister stated pt is not there, she will have him call us back, gl 5/20/25 lvm to [facility], gl 5/14/25 lvm to [facility], glLucila GAN

## 2025-06-03 ENCOUNTER — HOSPITAL ENCOUNTER (OUTPATIENT)
Dept: RADIOLOGY | Facility: HOSPITAL | Age: 60
Discharge: HOME | End: 2025-06-03
Attending: STUDENT IN AN ORGANIZED HEALTH CARE EDUCATION/TRAINING PROGRAM
Payer: MEDICAID

## 2025-06-03 DIAGNOSIS — K70.30 ALCOHOLIC CIRRHOSIS OF LIVER WITHOUT ASCITES: ICD-10-CM

## 2025-06-03 PROCEDURE — 76705 ECHO EXAM OF ABDOMEN: CPT | Mod: TC

## 2025-06-10 ENCOUNTER — EXTERNAL HOME HEALTH (OUTPATIENT)
Dept: HOME HEALTH SERVICES | Facility: HOSPITAL | Age: 60
End: 2025-06-10
Payer: MEDICAID

## (undated) DEVICE — KIT SURGICAL COLON .25 1.1OZ

## (undated) DEVICE — MANIFOLD 4 PORT

## (undated) DEVICE — MOUTHPIECE ENDO 60FR

## (undated) DEVICE — FORCEP ALLIGATOR 2.8MM W/NDL